# Patient Record
Sex: FEMALE | Race: WHITE | Employment: OTHER | URBAN - METROPOLITAN AREA
[De-identification: names, ages, dates, MRNs, and addresses within clinical notes are randomized per-mention and may not be internally consistent; named-entity substitution may affect disease eponyms.]

---

## 2017-02-01 ENCOUNTER — OFFICE VISIT (OUTPATIENT)
Dept: FAMILY MEDICINE CLINIC | Age: 57
End: 2017-02-01

## 2017-02-01 VITALS
SYSTOLIC BLOOD PRESSURE: 116 MMHG | OXYGEN SATURATION: 98 % | HEIGHT: 66 IN | RESPIRATION RATE: 16 BRPM | DIASTOLIC BLOOD PRESSURE: 70 MMHG | HEART RATE: 68 BPM | WEIGHT: 168 LBS | TEMPERATURE: 97.6 F | BODY MASS INDEX: 27 KG/M2

## 2017-02-01 DIAGNOSIS — F41.9 ANXIETY: Primary | ICD-10-CM

## 2017-02-01 DIAGNOSIS — Z79.899 ENCOUNTER FOR MEDICATION MANAGEMENT: ICD-10-CM

## 2017-02-01 PROBLEM — M54.50 LOW BACK PAIN: Status: ACTIVE | Noted: 2017-02-01

## 2017-02-01 RX ORDER — CHOLECALCIFEROL (VITAMIN D3) 125 MCG
2000 CAPSULE ORAL DAILY
COMMUNITY

## 2017-02-01 NOTE — PATIENT INSTRUCTIONS

## 2017-02-01 NOTE — PROGRESS NOTES
HISTORY OF PRESENT ILLNESS  Herberth Shah is a 64 y.o. female. HPI  She was seeing her psychiatrist Dr Pat Garay ~ 2 x a year for rx mgt of Xanax for situational anxiety and insomnia. She is now retiring and sent a note requesting that I take over this rx since pt has been stabilized and \"low maintenance\" for so many years. Patient takes a low dose of Xanax 0.25 mg. About 4-5 nights a week she takes 1 and on rare occ she may take 2 depending on her level of stress. She has a bottle of #60 w/ 2 refills BEFORE 3-2017 and still has some pills remaining in her current pill bottle for now. But she is following the instruction of her psychiatrist to see me prior to that for ongoing rx mgt moving fwd. She used to take Tylenol PM every night but after Dr Laurie Pan changed her over to the Xanax that has been working much better and she states she uses it w/ care bc she doesn't want to \"get addicted\". She used to take Lunesta prn sleep before as well but now is just using the Xanax on an as needed basis. She is only working part time now, so doesn't need as much. She has not had problems w/ depression in a long time. Review of Systems   Respiratory: Negative. Cardiovascular: Negative. Gastrointestinal: Negative. Psychiatric/Behavioral: Negative for depression.      Problem List  Date Reviewed: 2/1/2017          Codes Class Noted    Low back pain ICD-10-CM: M54.5  ICD-9-CM: 724.2  2/1/2017    Overview Signed 2/1/2017  8:33 AM by [address], [place]     Xrays 5-2016, normal, did PT             Recurrent cystitis ICD-10-CM: N30.90  ICD-9-CM: 595.9  4/28/2016        Chronic insomnia ICD-10-CM: F51.04  ICD-9-CM: 780.52  4/16/2015    Overview Addendum 4/28/2016 11:45 AM by [address], [place]     Psych rx'd Damon Mancilla worked really well, but insurance stopped covering             Postmenopause, LMP 48, No HRT ICD-10-CM: Z78.0  ICD-9-CM: V49.81  4/16/2015        Menopausal symptoms ICD-10-CM: N95.1  ICD-9-CM: 627.2  4/3/2014    Overview Signed 4/3/2014 12:01 PM by Elayne Cifuentes MD     Gyn Dr Nando Payne, pt declined HRT             Vitamin D deficiency ICD-10-CM: E55.9  ICD-9-CM: 268.9  7/25/2011    Overview Signed 7/25/2011 10:54 PM by Elayne Cifuentes MD     7/2011             Anxiety ICD-10-CM: F41.9  ICD-9-CM: 300.00  7/20/2011        H/O Post partum depression ICD-10-CM: F53  ICD-9-CM: 648.44, 445  7/20/2011    Overview Signed 7/20/2011  9:14 AM by Elayne Cifuentes MD     1997             Psoriasis ICD-10-CM: L40.9  ICD-9-CM: 696.1  7/20/2011    Overview Signed 7/20/2011  9:18 AM by Elayne Cifuentes MD     Derm Dr Jean Gunter             Allergic reaction to nickel ICD-10-CM: L23.0  ICD-9-CM: 692.83  7/20/2011        Panic disorder ICD-10-CM: F41.0  ICD-9-CM: 300.01  5/19/2010    Overview Addendum 2/1/2017  8:33 AM by Elayne Cifuentes MD     Psych Dr Fredis Burden (retired 2016)             Depression ICD-10-CM: F32.9  ICD-9-CM: 200  5/19/2010    Overview Addendum 2/1/2017  8:33 AM by Elayne Cifuentes MD     1997; Psych Dr Davin Cruz (retired 2016)             Colon polyp ICD-10-CM: V72.3  ICD-9-CM: 211.3  5/19/2010    Overview Signed 5/19/2010 10:06 AM by Philip Holland     Benign              Family history of breast cancer ICD-10-CM: Z80.3  ICD-9-CM: V16.3  5/19/2010        Family history of diabetes mellitus (DM) ICD-10-CM: Z83.3  ICD-9-CM: V18.0  5/19/2010        Migraine ICD-10-CM: K86.779  ICD-9-CM: 346.90  5/19/2010    Overview Addendum 4/3/2014 11:40 AM by Elayne Cifuentes MD     4/04;  Acute migraine w/ neuro changes summer 2013, [facility] ER, CT scan negative             Negative Screening for IHSS, +FHx ICD-10-CM: Z13.9  ICD-9-CM: V82.9  5/19/2010    Overview Signed 5/19/2010 10:09 AM by Philip Holland     For IHSS (negative)                 Past Surgical History   Procedure Laterality Date    Endoscopy, colon, diagnostic  5/2005     normal, f/u 10 yrs,  Narinder    Hx hemorrhoidectomy  3/2002    Xr chest pa lat  10/2008     normal    Colonoscopy  2002     benign polyp; Dr Ra Marc Hx gi  2001     Nissen Fundoplication for severe reflux    Hx malignant skin lesion excision  1994     BCC excised right chest    Biopsy cervix  2003     negative    Female ivf  1997     successful    Hx dilation and curettage  2009     negative     Current Outpatient Prescriptions   Medication Sig    cholecalciferol, vitamin D3, (VITAMIN D3) 2,000 unit tab Take  by mouth.  ALPRAZolam (XANAX) 0.25 mg tablet Take 0.25 mg by mouth nightly as needed for Anxiety or Sleep. Per her Psychiatrist     No current facility-administered medications for this visit.         Allergies   Allergen Reactions    Latex Other (comments)    Adhesive Rash    Demerol [Meperidine] Other (comments)     GI    Morphine Other (comments)     Chest pain    Other Medication Other (comments)     Nickel     Social History     Social History    Marital status:      Spouse name: N/A    Number of children: 1    Years of education: N/A     Occupational History    Dental Hygenist, part time, 2 x a week      Social History Main Topics    Smoking status: Never Smoker    Smokeless tobacco: Never Used    Alcohol use Yes      Comment: very occ small glass of wine on special occasions now the past year, prior to that she had 1-2 glasses in the evenings a few x a week    Drug use: No    Sexual activity: Yes     Partners: Male     Other Topics Concern    Caffeine Concern No     1 small cup of coffee a day    Special Diet No     just trying to eat more sensbily    Exercise Yes     not much the past 2 weeks, but usually walks 1 mile qd in her neighborhood     Social History Narrative     Visit Vitals    /70 (BP 1 Location: Left arm, BP Patient Position: Sitting)    Pulse 68    Temp 97.6 °F (36.4 °C) (Oral)    Resp 16    Ht 5' 6\" (1.676 m)    Wt 168 lb (76.2 kg)    LMP 07/19/2011    SpO2 98%    BMI 27.12 kg/m2       Physical Exam   Constitutional: She is oriented to person, place, and time. No distress. Cardiovascular: Normal rate, regular rhythm and normal heart sounds. Pulmonary/Chest: Effort normal and breath sounds normal.   Neurological: She is alert and oriented to person, place, and time. Psychiatric: She has a normal mood and affect. Her behavior is normal.   Vitals reviewed. ASSESSMENT and PLAN    ICD-10-CM ICD-9-CM    1. Anxiety F41.9 300.00    2. Encounter for medication management Z79.899 V58.69      She was seeing her psychiatrist Dr Khurram Rucker ~ 2 x a year for rx mgt of Xanax for situational anxiety and insomnia. She is now retiring and sent a note requesting that I take over this rx since pt has been stabilized and \"low maintenance\" for so many years. Patient takes a low dose of Xanax 0.25 mg. About 4-5 nights a week she takes 1 and on rare occ she may take 2 depending on her level of stress. She has a bottle of #60 w/ 2 refills BEFORE 3-2017 and still has some pills remaining in her current pill bottle for now. But she is following the instruction of her psychiatrist to see me prior to that for ongoing rx mgt moving fwd. She will notify her pharmacy when her renewals are due and have them fwd to me.  pulled and reviewed. No problems noted. Low abuse/misuse potential. Patient counseled and reviewed medications, effects, side effects, indications, risks, benefits, precautions, potential interactions w/ other medications, abuse potential and negative health implications associated w/ overuse of controlled stimulant or sedating medications. Patient expressed understanding and agreement with current plan of care. Controlled Substance Agreement Contract given to pt today which she is taking home w/ her to review and will bring back. She is scheduling fasting CPE for end of April or early May.

## 2017-02-01 NOTE — PROGRESS NOTES
Chief Complaint   Patient presents with    Medication Evaluation     here for a fill on Xanax      1. Have you been to the ER, urgent care clinic since your last visit? Hospitalized since your last visit? No    2. Have you seen or consulted any other health care providers outside of the [address] [place] [address] since your last visit? Include any pap smears or colon screening.  No

## 2017-03-15 RX ORDER — ALPRAZOLAM 0.25 MG/1
.25-.5 TABLET ORAL
Qty: 60 TAB | Refills: 2 | OUTPATIENT
Start: 2017-03-15 | End: 2017-06-16 | Stop reason: SDUPTHER

## 2017-03-15 NOTE — TELEPHONE ENCOUNTER
Contact # is [phone]    Patient is requesting a refill on medication:  Requested Prescriptions     Pending Prescriptions Disp Refills    ALPRAZolam (XANAX) 0.25 mg tablet       Sig: Take 1 Tab by mouth nightly as needed for Anxiety or Sleep. Max Daily Amount: 0.25 mg. Per her Psychiatrist     Prescription will run out tomorrow.  Pharmacy verified

## 2017-03-15 NOTE — TELEPHONE ENCOUNTER
No problem on , she does get them filled monthly previous rx's from Dr Carri Wharton 2/16/17, 1/8/17, 12/8/16, 11/8/16, 10/7/16, 9/2/16, 7/31/16, 6/19/16, 5/19/16, 4/14/16, 3/17/16 all for # 60    Xanax called in

## 2017-05-04 ENCOUNTER — OFFICE VISIT (OUTPATIENT)
Dept: FAMILY MEDICINE CLINIC | Age: 57
End: 2017-05-04

## 2017-05-04 VITALS
SYSTOLIC BLOOD PRESSURE: 110 MMHG | WEIGHT: 163.6 LBS | HEIGHT: 66 IN | TEMPERATURE: 97.4 F | BODY MASS INDEX: 26.29 KG/M2 | DIASTOLIC BLOOD PRESSURE: 72 MMHG | OXYGEN SATURATION: 98 % | RESPIRATION RATE: 18 BRPM | HEART RATE: 64 BPM

## 2017-05-04 DIAGNOSIS — E78.00 HYPERCHOLESTEROLEMIA: ICD-10-CM

## 2017-05-04 DIAGNOSIS — Z00.00 ROUTINE GENERAL MEDICAL EXAMINATION AT A HEALTH CARE FACILITY: Primary | ICD-10-CM

## 2017-05-04 DIAGNOSIS — E55.9 VITAMIN D DEFICIENCY: ICD-10-CM

## 2017-05-04 DIAGNOSIS — Z79.899 CONTROLLED SUBSTANCE AGREEMENT SIGNED: ICD-10-CM

## 2017-05-04 DIAGNOSIS — F51.04 CHRONIC INSOMNIA: ICD-10-CM

## 2017-05-04 DIAGNOSIS — F41.9 ANXIETY: ICD-10-CM

## 2017-05-04 DIAGNOSIS — Z51.81 ENCOUNTER FOR MEDICATION MONITORING: ICD-10-CM

## 2017-05-04 PROBLEM — Z12.83 SCREENING EXAM FOR SKIN CANCER: Status: ACTIVE | Noted: 2017-05-04

## 2017-05-04 RX ORDER — IBUPROFEN 200 MG
TABLET ORAL
COMMUNITY
End: 2017-05-04

## 2017-05-04 NOTE — PROGRESS NOTES
HISTORY OF PRESENT ILLNESS  Mary Villarreal is a 64 y.o. female. HPI  Annual CPE fasting. Overall has been getting along well and feeling well in general.   Since first of the year she has been eating healthier and exercising more regularly again. She is happy her wt is coming down. Review of Systems   Constitutional: Negative. HENT: Negative. Eyes: Negative. Respiratory: Negative. Cardiovascular: Negative. Negative for chest pain and palpitations. Gastrointestinal: Negative. Genitourinary: Negative. Musculoskeletal: Negative. Skin:        Sees Derm routinely and has skin cancer screening exams   Neurological: Negative. Endo/Heme/Allergies: Negative. Psychiatric/Behavioral: Negative for depression. Takes Xanax 1-2 tablets qhs prn. She has tried and failed Tylenol PM, Melatonin, Lunesta. Her psychiatrist has had her on Xanax which has been the most effective regimen for her bc it calms her mind, enables her to relax, settles her racy/panicky feelings at night so that she can fall asleep and stay asleep soundly at night w/o feeling \"hung over\" the next morning. She functions very well the next day.       Problem List  Date Reviewed: 5/4/2017          Codes Class Noted    Controlled substance agreement signed ICD-10-CM: Z79.899  ICD-9-CM: V58.69  5/4/2017    Overview Signed 5/4/2017  1:02 PM by Kodi Jade MD     [id number]             Hypercholesterolemia ICD-10-CM: E78.00  ICD-9-CM: 272.0  5/4/2017        Screening exams for skin cancer ICD-10-CM: Z12.83  ICD-9-CM: V76.43  5/4/2017    Overview Signed 5/4/2017  1:07 PM by Kodi Jade MD     Per Derm Dr Luis Fernando Glass             Low back pain ICD-10-CM: M54.5  ICD-9-CM: 724.2  2/1/2017    Overview Signed 2/1/2017  8:33 AM by Kodi Jade MD     Xrays 5-2016, normal, did PT             Recurrent cystitis ICD-10-CM: N30.90  ICD-9-CM: 595.9  4/28/2016        Chronic insomnia ICD-10-CM: F51.04  ICD-9-CM: 780.52  4/16/2015    Overview Addendum 4/28/2016 11:45 AM by Maldonado Ray MD     Psych rx'd Enedina Mcardle worked really well, but insurance stopped covering             Postmenopause, LMP 48, No HRT ICD-10-CM: Z78.0  ICD-9-CM: V49.81  4/16/2015        Menopausal symptoms ICD-10-CM: N95.1  ICD-9-CM: 627.2  4/3/2014    Overview Signed 4/3/2014 12:01 PM by Maldonado Ray MD     Gyn Dr Azam Aponte, pt declined HRT             Vitamin D deficiency ICD-10-CM: E55.9  ICD-9-CM: 268.9  7/25/2011    Overview Signed 7/25/2011 10:54 PM by Maldonado Ray MD     7/2011             Anxiety ICD-10-CM: F41.9  ICD-9-CM: 300.00  7/20/2011        H/O Post partum depression ICD-10-CM: F53  ICD-9-CM: 648.44, 401  7/20/2011    Overview Signed 7/20/2011  9:14 AM by Maldonado Ray MD     1997             Psoriasis ICD-10-CM: L40.9  ICD-9-CM: 696.1  7/20/2011    Overview Signed 7/20/2011  9:18 AM by Maldonado Ray MD     Derm Dr Godfrey Hanna             Allergic reaction to nickel ICD-10-CM: L23.0  ICD-9-CM: 692.83  7/20/2011        Panic disorder ICD-10-CM: F41.0  ICD-9-CM: 300.01  5/19/2010    Overview Addendum 2/1/2017  8:33 AM by Maldonado Ray MD     Psych Dr Shane Montero (retired 2016)             Depression ICD-10-CM: F32.9  ICD-9-CM: 852  5/19/2010    Overview Addendum 2/1/2017  8:33 AM by Maldonado Ray MD     1997; Psych Dr Junito Thornton (retired 2016)             Colon polyp ICD-10-CM: O25.0  ICD-9-CM: 211.3  5/19/2010    Overview Signed 5/19/2010 10:06 AM by Jannetta Dancer     Benign              Family history of breast cancer ICD-10-CM: Z80.3  ICD-9-CM: V16.3  5/19/2010        Family history of diabetes mellitus (DM) ICD-10-CM: Z83.3  ICD-9-CM: V18.0  5/19/2010        Migraine ICD-10-CM: B90.058  ICD-9-CM: 346.90  5/19/2010    Overview Addendum 4/3/2014 11:40 AM by Maldonado Ray MD     4/04;  Acute migraine w/ neuro changes summer 2013, [facility] ER, CT scan negative Screening for cardiovascular condition ICD-10-CM: Z13.6  ICD-9-CM: V81.2  2010    Overview Signed 2010 10:09 AM by Suzie Grewal     For IHSS (negative)                 Past Surgical History:   Procedure Laterality Date    BIOPSY CERVIX      negative    COLONOSCOPY      benign polyp; Dr Zain Duggan    successful    HX COLONOSCOPY  2005    normal, f/u 10 yrs, Dr Mervin Baron      negative    HX GI      Nissen Fundoplication for severe reflux    HX HEMORRHOIDECTOMY  3/2002    HX MALIGNANT SKIN LESION EXCISION      BCC excised right chest    XR CHEST PA LAT  10/2008    normal     OB History      Para Term  AB TAB SAB Ectopic Multiple Living    1 1                Obstetric Comments    Menarche:  12. LMP: 53.  # of Children:  1. Age at Delivery of First Child:  39.   Hysterectomy/oophorectomy:  NO/NO. Breast Bx:  no.  Hx of Breast Feeding:  yes. BCP:  no. Hormone therapy:  No    .  x 1, s/p IVF in ; Gyn Dr María Mock        Current Outpatient Prescriptions   Medication Sig    ALPRAZolam (XANAX) 0.25 mg tablet Take 1-2 Tabs by mouth nightly as needed for Anxiety or Sleep. Max Daily Amount: 0.5 mg.    cholecalciferol, vitamin D3, (VITAMIN D3) 2,000 unit tab Take  by mouth. Takes 1-2 x a week     No current facility-administered medications for this visit.         Allergies   Allergen Reactions    Latex Other (comments)    Adhesive Rash    Demerol [Meperidine] Other (comments)     GI    Morphine Other (comments)     Chest pain    Other Medication Other (comments)     Nickel     Social History     Social History    Marital status:      Spouse name: N/A    Number of children: 1    Years of education: N/A     Occupational History    Dental Hygenist, part time, 2 x a week      Social History Main Topics    Smoking status: Never Smoker    Smokeless tobacco: Never Used    Alcohol use No      Comment: none since ; usually very occ small glass of wine on special occasions now the past year, prior to that she had 1-2 glasses in the evenings a few x a week    Drug use: No    Sexual activity: Yes     Partners: Male     Other Topics Concern    Caffeine Concern No     1 small cup of coffee a day    Weight Concern No     happy her wt is down from 178 to 163 w/ better diet and starting to exercise    Special Diet Yes     since 2017: cut out processed foods, more fruits and healthier food choices overall    Exercise Yes     walks 1/2 hr bid every day     Social History Narrative     Immunization History   Administered Date(s) Administered    Influenza Nasal Vaccine 2014    Tdap 2015       Family History   Problem Relation Age of Onset    Breast Cancer Mother 46    Cancer Mother       of lung cancer, 59; smoker    Cancer Father       of lung cancer, 76; smoker    Diabetes Father     Heart Disease Brother       IHSS age 32    Heart Attack Brother     Hypertension Brother     Hypertension Brother     High Cholesterol Brother     Breast Cancer Maternal Grandmother 48    Heart Disease Maternal Grandmother 61    Diabetes Paternal Grandmother     Heart Disease Paternal Grandmother       CHF 76    Stroke Paternal Grandfather 79    Breast Cancer Maternal Aunt 36    Diabetes Paternal Aunt     Diabetes Paternal Uncle     Other Son      Asperger's Syndrome     Visit Vitals    /72 (BP 1 Location: Left arm, BP Patient Position: Sitting)    Pulse 64    Temp 97.4 °F (36.3 °C) (Oral)    Resp 18    Ht 5' 6\" (1.676 m)    Wt 163 lb 9.6 oz (74.2 kg)    LMP 2011    SpO2 98%    BMI 26.41 kg/m2     Physical Exam   Constitutional: She is oriented to person, place, and time. She appears well-developed and well-nourished. No distress.    HENT:   Right Ear: Tympanic membrane normal.   Left Ear: Tympanic membrane normal.   Nose: Nose normal.   Mouth/Throat: Oropharynx is clear and moist. No oropharyngeal exudate. Eyes: Conjunctivae and EOM are normal. Pupils are equal, round, and reactive to light. Neck: Neck supple. Carotid bruit is not present. No thyromegaly present. Cardiovascular: Normal rate, regular rhythm and normal heart sounds. No murmur heard. Pulmonary/Chest: Effort normal and breath sounds normal. No respiratory distress. Abdominal: Soft. Bowel sounds are normal. She exhibits no distension and no mass. There is no tenderness. Musculoskeletal: Normal range of motion. She exhibits no edema or tenderness. Lymphadenopathy:     She has no cervical adenopathy. Neurological: She is alert and oriented to person, place, and time. She has normal reflexes. No cranial nerve deficit. Skin: Skin is warm. Superficial mild spider veins B lower legs   Psychiatric: She has a normal mood and affect. Her behavior is normal. Judgment and thought content normal.   Vitals reviewed. ASSESSMENT and PLAN    ICD-10-CM ICD-9-CM    1. Routine general medical examination at a health care facility Z00.00 V70.0 CBC W/O DIFF      METABOLIC PANEL, COMPREHENSIVE      LIPID PANEL      TSH 3RD GENERATION      URINALYSIS W/MICROSCOPIC      VITAMIN D, 25 HYDROXY   2. Hypercholesterolemia E78.00 272.0 LIPID PANEL   3. Vitamin D deficiency E55.9 268.9 VITAMIN D, 25 HYDROXY   4. Chronic insomnia F51.04 780.52    5. Anxiety F41.9 300.00    6. Encounter for medication monitoring Z51.81 V58.83    7. Controlled substance agreement signed Z79.899 V58.69      Fasting labs today  Reviewed diet, nutrition, exercise and weight control; commended on her progress thus far  Cardiovascular risk and specific lipid/LDL goals reviewed   pulled and reviewed. No problems noted with Xanax.  Low abuse/misuse potential. Patient counseled and reviewed medications, effects, side effects, indications, risks, benefits, precautions, potential interactions w/ other medications, abuse potential and negative health implications associated w/ overuse of controlled stimulant or sedating medications. Patient expressed understanding and agreement with current plan of care. CSA reviewed, signed, copy to pt, copy filed to scan  Insomnia/ sleep hygiene counseling. Handouts given.   Further follow up & other recommendations pending review of labs as well  Sees gyn Dr Tomy Darden annually for well woman visits/gyn exams, reportedly normal pap 9-2016  Mammogram negative 9-2016  Scheduling her colonoscopy screening for this year (overdue), pt has info to schedule this  If all remains good and stable, RTC 6 months for controlled med check and 1 yr for CPE

## 2017-05-04 NOTE — PROGRESS NOTES
\"Reviewed record in preparation for visit and have obtained the necessary documentation\"  Chief Complaint   Patient presents with    Complete Physical     Patient presents in the office today for a complete physical     1. Have you been to the ER, urgent care clinic since your last visit? Hospitalized since your last visit? No    2. Have you seen or consulted any other health care providers outside of the [address] [place] [address] since your last visit? Include any pap smears or colon screening.  No\

## 2017-05-04 NOTE — PATIENT INSTRUCTIONS
Insomnia: Care Instructions  Your Care Instructions  Insomnia is the inability to sleep well. It is a common problem for most people at some time. Insomnia may make it hard for you to get to sleep, stay asleep, or sleep as long as you need to. This can make you tired and grouchy during the day. It can also make you forgetful, less effective at work, and unhappy. Insomnia can be caused by conditions such as depression or anxiety. Pain can also affect your ability to sleep. When these problems are solved, the insomnia usually clears up. But sometimes bad sleep habits can cause insomnia. If insomnia is affecting your work or your enjoyment of life, you can take steps to improve your sleep. Follow-up care is a key part of your treatment and safety. Be sure to make and go to all appointments, and call your doctor if you are having problems. It's also a good idea to know your test results and keep a list of the medicines you take. How can you care for yourself at home? What to avoid  · Do not have drinks with caffeine, such as coffee or black tea, for 8 hours before bed. · Do not smoke or use other types of tobacco near bedtime. Nicotine is a stimulant and can keep you awake. · Avoid drinking alcohol late in the evening, because it can cause you to wake in the middle of the night. · Do not eat a big meal close to bedtime. If you are hungry, eat a light snack. · Do not drink a lot of water close to bedtime, because the need to urinate may wake you up during the night. · Do not read or watch TV in bed. Use the bed only for sleeping and sexual activity. What to try  · Go to bed at the same time every night, and wake up at the same time every morning. Do not take naps during the day. · Keep your bedroom quiet, dark, and cool. · Sleep on a comfortable pillow and mattress. · If watching the clock makes you anxious, turn it facing away from you so you cannot see the time.   · If you worry when you lie down, start a worry book. Well before bedtime, write down your worries, and then set the book and your concerns aside. · Try meditation or other relaxation techniques before you go to bed. · If you cannot fall asleep, get up and go to another room until you feel sleepy. Do something relaxing. Repeat your bedtime routine before you go to bed again. · Make your house quiet and calm about an hour before bedtime. Turn down the lights, turn off the TV, log off the computer, and turn down the volume on music. This can help you relax after a busy day. When should you call for help? Watch closely for changes in your health, and be sure to contact your doctor if:  · Your efforts to improve your sleep do not work. · Your insomnia gets worse. · You have been feeling down, depressed, or hopeless or have lost interest in things that you usually enjoy. Where can you learn more? Go to http://glenny-macho.info/. Enter P513 in the search box to learn more about \"Insomnia: Care Instructions. \"  Current as of: July 26, 2016  Content Version: 11.2  © 0727-0802 [employer]. Care instructions adapted under license by [employer] (which disclaims liability or warranty for this information). If you have questions about a medical condition or this instruction, always ask your healthcare professional. [place] [address] any warranty or liability for your use of this information. Well Visit, Women 48 to 72: Care Instructions  Your Care Instructions  Physical exams can help you stay healthy. Your doctor has checked your overall health and may have suggested ways to take good care of yourself. He or she also may have recommended tests. At home, you can help prevent illness with healthy eating, regular exercise, and other steps. Follow-up care is a key part of your treatment and safety. Be sure to make and go to all appointments, and call your doctor if you are having problems. It's also a good idea to know your test results and keep a list of the medicines you take. How can you care for yourself at home? · Reach and stay at a healthy weight. This will lower your risk for many problems, such as obesity, diabetes, heart disease, and high blood pressure. · Get at least 30 minutes of exercise on most days of the week. Walking is a good choice. You also may want to do other activities, such as running, swimming, cycling, or playing tennis or team sports. · Do not smoke. Smoking can make health problems worse. If you need help quitting, talk to your doctor about stop-smoking programs and medicines. These can increase your chances of quitting for good. · Protect your skin from too much sun. When you're outdoors from 10 a.m. to 4 p.m., stay in the shade or cover up with clothing and a hat with a wide brim. Wear sunglasses that block UV rays. Even when it's cloudy, put broad-spectrum sunscreen (SPF 30 or higher) on any exposed skin. · See a dentist one or two times a year for checkups and to have your teeth cleaned. · Wear a seat belt in the car. · Limit alcohol to 1 drink a day. Too much alcohol can cause health problems. Follow your doctor's advice about when to have certain tests. These tests can spot problems early. · Cholesterol. Your doctor will tell you how often to have this done based on your age, family history, or other things that can increase your risk for heart attack and stroke. · Blood pressure. Have your blood pressure checked during a routine doctor visit. Your doctor will tell you how often to check your blood pressure based on your age, your blood pressure results, and other factors. · Mammogram. Ask your doctor how often you should have a mammogram, which is an X-ray of your breasts. A mammogram can spot breast cancer before it can be felt and when it is easiest to treat.   · Pap test and pelvic exam. Ask your doctor how often you should have a Pap test. You may not need to have a Pap test as often as you used to. · Vision. Have your eyes checked every year or two or as often as your doctor suggests. Some experts recommend that you have yearly exams for glaucoma and other age-related eye problems starting at age 48. · Hearing. Tell your doctor if you notice any change in your hearing. You can have tests to find out how well you hear. · Diabetes. Ask your doctor whether you should have tests for diabetes. · Colon cancer. You should begin tests for colon cancer at age 48. You may have one of several tests. Your doctor will tell you how often to have tests based on your age and risk. Risks include whether you already had a precancerous polyp removed from your colon or whether your parents, sisters and brothers, or children have had colon cancer. · Thyroid disease. Talk to your doctor about whether to have your thyroid checked as part of a regular physical exam. Women have an increased chance of a thyroid problem. · Osteoporosis. You should begin tests for bone density at age 72. If you are younger than 72, ask your doctor whether you have factors that may increase your risk for this disease. You may want to have this test before age 72. · Heart attack and stroke risk. At least every 4 to 6 years, you should have your risk for heart attack and stroke assessed. Your doctor uses factors such as your age, blood pressure, cholesterol, and whether you smoke or have diabetes to show what your risk for a heart attack or stroke is over the next 10 years. When should you call for help? Watch closely for changes in your health, and be sure to contact your doctor if you have any problems or symptoms that concern you. Where can you learn more? Go to http://glenny-macho.info/. Enter A833 in the search box to learn more about \"Well Visit, Women 50 to 72: Care Instructions. \"  Current as of: July 19, 2016  Content Version: 11.2  © 5813-1089 [employer]. Care instructions adapted under license by [employer] (which disclaims liability or warranty for this information). If you have questions about a medical condition or this instruction, always ask your healthcare professional. [address] any warranty or liability for your use of this information.

## 2017-05-04 NOTE — MR AVS SNAPSHOT
Visit Information Date & Time Provider Department Dept. Phone Encounter #  
 5/4/2017 11:00 AM Patel [place], [address] [phone] [id number] Upcoming Health Maintenance Date Due COLONOSCOPY 12/15/2015 INFLUENZA AGE 9 TO ADULT 8/1/2017 BREAST CANCER SCRN MAMMOGRAM 9/8/2018 PAP AKA CERVICAL CYTOLOGY 9/30/2019 DTaP/Tdap/Td series (2 - Td) 8/26/2025 Allergies as of 5/4/2017  Review Complete On: 5/4/2017 By: Amanda Phillips MD  
  
 Severity Noted Reaction Type Reactions Latex  09/03/2015    Other (comments) Adhesive  05/19/2010    Rash Demerol [Meperidine]  05/19/2010    Other (comments) GI Morphine  04/03/2014    Other (comments) Chest pain Other Medication  05/19/2010    Other (comments) Nickel Current Immunizations  Reviewed on 4/28/2016 Name Date Influenza Nasal Vaccine 11/2/2014 Tdap 8/26/2015 Not reviewed this visit You Were Diagnosed With   
  
 Codes Comments Routine general medical examination at a health care facility    -  Primary ICD-10-CM: Z00.00 ICD-9-CM: V70.0 Chronic insomnia     ICD-10-CM: F51.04 
ICD-9-CM: 780.52 Anxiety     ICD-10-CM: F41.9 ICD-9-CM: 300.00 Encounter for medication monitoring     ICD-10-CM: Z51.81 
ICD-9-CM: V58.83 Vitamin D deficiency     ICD-10-CM: E55.9 ICD-9-CM: 268.9 Vitals BP Pulse Temp Resp Height(growth percentile) Weight(growth percentile) 110/72 (BP 1 Location: Left arm, BP Patient Position: Sitting) 64 97.4 °F (36.3 °C) (Oral) 18 5' 6\" (1.676 m) 163 lb 9.6 oz (74.2 kg) LMP SpO2 BMI OB Status Smoking Status 07/19/2011 98% 26.41 kg/m2 Postmenopausal Never Smoker Vitals History BMI and BSA Data Body Mass Index Body Surface Area  
 26.41 kg/m 2 1.86 m 2 Preferred Pharmacy Pharmacy Name Phone  [id number] [phone] [place] [phone] Your Updated Medication List  
  
   
This list is accurate as of: 5/4/17 11:28 AM.  Always use your most recent med list.  
  
  
  
  
 ALPRAZolam 0.25 mg tablet Commonly known as:  Adryan Fuse Take 1-2 Tabs by mouth nightly as needed for Anxiety or Sleep. Max Daily Amount: 0.5 mg. VITAMIN D3 2,000 unit Tab Generic drug:  cholecalciferol (vitamin D3) Take  by mouth. Takes 1-2 x a week We Performed the Following CBC W/O DIFF [67441 CPT(R)] LIPID PANEL [86075 CPT(R)] METABOLIC PANEL, COMPREHENSIVE [90525 CPT(R)] TSH 3RD GENERATION [33853 CPT(R)] URINALYSIS W/MICROSCOPIC [45240 CPT(R)] VITAMIN D, 25 HYDROXY [id number] CPT(R)] Patient Instructions Insomnia: Care Instructions Your Care Instructions Insomnia is the inability to sleep well. It is a common problem for most people at some time. Insomnia may make it hard for you to get to sleep, stay asleep, or sleep as long as you need to. This can make you tired and grouchy during the day. It can also make you forgetful, less effective at work, and unhappy. Insomnia can be caused by conditions such as depression or anxiety. Pain can also affect your ability to sleep. When these problems are solved, the insomnia usually clears up. But sometimes bad sleep habits can cause insomnia. If insomnia is affecting your work or your enjoyment of life, you can take steps to improve your sleep. Follow-up care is a key part of your treatment and safety. Be sure to make and go to all appointments, and call your doctor if you are having problems. It's also a good idea to know your test results and keep a list of the medicines you take. How can you care for yourself at home? What to avoid · Do not have drinks with caffeine, such as coffee or black tea, for 8 hours before bed. · Do not smoke or use other types of tobacco near bedtime. Nicotine is a stimulant and can keep you awake. · Avoid drinking alcohol late in the evening, because it can cause you to wake in the middle of the night. · Do not eat a big meal close to bedtime. If you are hungry, eat a light snack. · Do not drink a lot of water close to bedtime, because the need to urinate may wake you up during the night. · Do not read or watch TV in bed. Use the bed only for sleeping and sexual activity. What to try · Go to bed at the same time every night, and wake up at the same time every morning. Do not take naps during the day. · Keep your bedroom quiet, dark, and cool. · Sleep on a comfortable pillow and mattress. · If watching the clock makes you anxious, turn it facing away from you so you cannot see the time. · If you worry when you lie down, start a worry book. Well before bedtime, write down your worries, and then set the book and your concerns aside. · Try meditation or other relaxation techniques before you go to bed. · If you cannot fall asleep, get up and go to another room until you feel sleepy. Do something relaxing. Repeat your bedtime routine before you go to bed again. · Make your house quiet and calm about an hour before bedtime. Turn down the lights, turn off the TV, log off the computer, and turn down the volume on music. This can help you relax after a busy day. When should you call for help? Watch closely for changes in your health, and be sure to contact your doctor if: 
· Your efforts to improve your sleep do not work. · Your insomnia gets worse. · You have been feeling down, depressed, or hopeless or have lost interest in things that you usually enjoy. Where can you learn more? Go to http://glenny-macho.info/. Enter P513 in the search box to learn more about \"Insomnia: Care Instructions. \" Current as of: July 26, 2016 Content Version: 11.2 © 8599-8540 [employer].  Care instructions adapted under license by [address] (which disclaims liability or warranty for this information). If you have questions about a medical condition or this instruction, always ask your healthcare professional. [place] [address] any warranty or liability for your use of this information. Well Visit, Women 48 to 72: Care Instructions Your Care Instructions Physical exams can help you stay healthy. Your doctor has checked your overall health and may have suggested ways to take good care of yourself. He or she also may have recommended tests. At home, you can help prevent illness with healthy eating, regular exercise, and other steps. Follow-up care is a key part of your treatment and safety. Be sure to make and go to all appointments, and call your doctor if you are having problems. It's also a good idea to know your test results and keep a list of the medicines you take. How can you care for yourself at home? · Reach and stay at a healthy weight. This will lower your risk for many problems, such as obesity, diabetes, heart disease, and high blood pressure. · Get at least 30 minutes of exercise on most days of the week. Walking is a good choice. You also may want to do other activities, such as running, swimming, cycling, or playing tennis or team sports. · Do not smoke. Smoking can make health problems worse. If you need help quitting, talk to your doctor about stop-smoking programs and medicines. These can increase your chances of quitting for good. · Protect your skin from too much sun. When you're outdoors from 10 a.m. to 4 p.m., stay in the shade or cover up with clothing and a hat with a wide brim. Wear sunglasses that block UV rays. Even when it's cloudy, put broad-spectrum sunscreen (SPF 30 or higher) on any exposed skin. · See a dentist one or two times a year for checkups and to have your teeth cleaned. · Wear a seat belt in the car. · Limit alcohol to 1 drink a day. Too much alcohol can cause health problems. Follow your doctor's advice about when to have certain tests. These tests can spot problems early. · Cholesterol. Your doctor will tell you how often to have this done based on your age, family history, or other things that can increase your risk for heart attack and stroke. · Blood pressure. Have your blood pressure checked during a routine doctor visit. Your doctor will tell you how often to check your blood pressure based on your age, your blood pressure results, and other factors. · Mammogram. Ask your doctor how often you should have a mammogram, which is an X-ray of your breasts. A mammogram can spot breast cancer before it can be felt and when it is easiest to treat. · Pap test and pelvic exam. Ask your doctor how often you should have a Pap test. You may not need to have a Pap test as often as you used to. · Vision. Have your eyes checked every year or two or as often as your doctor suggests. Some experts recommend that you have yearly exams for glaucoma and other age-related eye problems starting at age 48. · Hearing. Tell your doctor if you notice any change in your hearing. You can have tests to find out how well you hear. · Diabetes. Ask your doctor whether you should have tests for diabetes. · Colon cancer. You should begin tests for colon cancer at age 48. You may have one of several tests. Your doctor will tell you how often to have tests based on your age and risk. Risks include whether you already had a precancerous polyp removed from your colon or whether your parents, sisters and brothers, or children have had colon cancer. · Thyroid disease. Talk to your doctor about whether to have your thyroid checked as part of a regular physical exam. Women have an increased chance of a thyroid problem. · Osteoporosis. You should begin tests for bone density at age 72.  If you are younger than 72, ask your doctor whether you have factors that may increase your risk for this disease. You may want to have this test before age 72. · Heart attack and stroke risk. At least every 4 to 6 years, you should have your risk for heart attack and stroke assessed. Your doctor uses factors such as your age, blood pressure, cholesterol, and whether you smoke or have diabetes to show what your risk for a heart attack or stroke is over the next 10 years. When should you call for help? Watch closely for changes in your health, and be sure to contact your doctor if you have any problems or symptoms that concern you. Where can you learn more? Go to http://glenny-macho.info/. Enter K573 in the search box to learn more about \"Well Visit, Women 50 to 72: Care Instructions. \" Current as of: July 19, 2016 Content Version: 11.2 © 0297-5172 [employer]. Care instructions adapted under license by [employer] (which disclaims liability or warranty for this information). If you have questions about a medical condition or this instruction, always ask your healthcare professional. [address] any warranty or liability for your use of this information. Introducing [facility]! Dear Lv Lobato: Thank you for requesting a Maintenance Assistant account. Our records indicate that you already have an active Maintenance Assistant account. You can access your account anytime at [email or web address] Did you know that you can access your hospital and ER discharge instructions at any time in Maintenance Assistant? You can also review all of your test results from your hospital stay or ER visit. Additional Information If you have questions, please visit the Frequently Asked Questions section of the Maintenance Assistant website at [email or web address] Remember, Maintenance Assistant is NOT to be used for urgent needs. For medical emergencies, dial 911. Now available from your iPhone and Android! Please provide this summary of care documentation to your next provider. Your primary care clinician is listed as CLOVIS MUNOZ. If you have any questions after today's visit, please call [phone].

## 2017-05-05 LAB
25(OH)D3+25(OH)D2 SERPL-MCNC: 24.6 NG/ML (ref 30–100)
ALBUMIN SERPL-MCNC: 4.5 G/DL (ref 3.5–5.5)
ALBUMIN/GLOB SERPL: 1.7 {RATIO} (ref 1.2–2.2)
ALP SERPL-CCNC: 63 IU/L (ref 39–117)
ALT SERPL-CCNC: 24 IU/L (ref 0–32)
APPEARANCE UR: CLEAR
AST SERPL-CCNC: 24 IU/L (ref 0–40)
BACTERIA #/AREA URNS HPF: NORMAL /[HPF]
BILIRUB SERPL-MCNC: 0.4 MG/DL (ref 0–1.2)
BILIRUB UR QL STRIP: NEGATIVE
BUN SERPL-MCNC: 15 MG/DL (ref 6–24)
BUN/CREAT SERPL: 20 (ref 9–23)
CALCIUM SERPL-MCNC: 9.4 MG/DL (ref 8.7–10.2)
CASTS URNS QL MICRO: NORMAL /LPF
CHLORIDE SERPL-SCNC: 101 MMOL/L (ref 96–106)
CHOLEST SERPL-MCNC: 221 MG/DL (ref 100–199)
CO2 SERPL-SCNC: 24 MMOL/L (ref 18–29)
COLOR UR: YELLOW
CREAT SERPL-MCNC: 0.75 MG/DL (ref 0.57–1)
EPI CELLS #/AREA URNS HPF: NORMAL /HPF
ERYTHROCYTE [DISTWIDTH] IN BLOOD BY AUTOMATED COUNT: 13.4 % (ref 12.3–15.4)
GLOBULIN SER CALC-MCNC: 2.6 G/DL (ref 1.5–4.5)
GLUCOSE SERPL-MCNC: 77 MG/DL (ref 65–99)
GLUCOSE UR QL: NEGATIVE
HCT VFR BLD AUTO: 41.5 % (ref 34–46.6)
HDLC SERPL-MCNC: 74 MG/DL
HGB BLD-MCNC: 13.8 G/DL (ref 11.1–15.9)
HGB UR QL STRIP: ABNORMAL
INTERPRETATION, 910389: NORMAL
KETONES UR QL STRIP: NEGATIVE
LDLC SERPL CALC-MCNC: 137 MG/DL (ref 0–99)
LEUKOCYTE ESTERASE UR QL STRIP: NEGATIVE
MCH RBC QN AUTO: 32.4 PG (ref 26.6–33)
MCHC RBC AUTO-ENTMCNC: 33.3 G/DL (ref 31.5–35.7)
MCV RBC AUTO: 97 FL (ref 79–97)
MICRO URNS: ABNORMAL
MUCOUS THREADS URNS QL MICRO: PRESENT
NITRITE UR QL STRIP: NEGATIVE
PH UR STRIP: 5.5 [PH] (ref 5–7.5)
PLATELET # BLD AUTO: 265 X10E3/UL (ref 150–379)
POTASSIUM SERPL-SCNC: 4.7 MMOL/L (ref 3.5–5.2)
PROT SERPL-MCNC: 7.1 G/DL (ref 6–8.5)
PROT UR QL STRIP: NEGATIVE
RBC # BLD AUTO: 4.26 X10E6/UL (ref 3.77–5.28)
RBC #/AREA URNS HPF: NORMAL /HPF
SODIUM SERPL-SCNC: 140 MMOL/L (ref 134–144)
SP GR UR: 1.02 (ref 1–1.03)
TRIGL SERPL-MCNC: 49 MG/DL (ref 0–149)
TSH SERPL DL<=0.005 MIU/L-ACNC: 1.16 UIU/ML (ref 0.45–4.5)
UROBILINOGEN UR STRIP-MCNC: 0.2 MG/DL (ref 0.2–1)
VLDLC SERPL CALC-MCNC: 10 MG/DL (ref 5–40)
WBC # BLD AUTO: 4.1 X10E3/UL (ref 3.4–10.8)
WBC #/AREA URNS HPF: NORMAL /HPF

## 2017-05-12 NOTE — PROGRESS NOTES
Urine, blood counts, liver, kidney, thyroid all ok  BS good and normal/non diabetes range  Vit D low. Start taking her supplement daily. Review lipids. LDL is mildly elevated and about same as it has been the past few years. HDL remains very good. Follow AHA cholesterol lowering diet and try to get at least 150 minutes moderate exercise per week. Fasting CPE 1 yr.  Follow up controlled med check 6months

## 2017-05-15 ENCOUNTER — TELEPHONE (OUTPATIENT)
Dept: FAMILY MEDICINE CLINIC | Age: 57
End: 2017-05-15

## 2017-05-15 NOTE — TELEPHONE ENCOUNTER
Patient is calling in regards to a missed call from [address], tried contacting nurse, no success.     Best call back # for patient:[phone]  *patient states that best callback time for her is from 1-2pm during her lunch today

## 2017-06-16 ENCOUNTER — TELEPHONE (OUTPATIENT)
Dept: FAMILY MEDICINE CLINIC | Age: 57
End: 2017-06-16

## 2017-06-17 RX ORDER — ALPRAZOLAM 0.25 MG/1
TABLET ORAL
Qty: 60 TAB | Refills: 2 | OUTPATIENT
Start: 2017-06-17 | End: 2017-09-15 | Stop reason: SDUPTHER

## 2017-06-17 NOTE — TELEPHONE ENCOUNTER
May phone in rx as noted      Future Appointments:  11/3/2017 10:00 AM [address] [place] [address] [place], [place] [facility]

## 2017-07-07 ENCOUNTER — TELEPHONE (OUTPATIENT)
Dept: FAMILY MEDICINE CLINIC | Age: 57
End: 2017-07-07

## 2017-07-07 NOTE — TELEPHONE ENCOUNTER
Called pt to let her know that she doesn't need to get a bone density until she is 60. She said it is fine waiting but she has been getting calls for over a year now and wanted to double check.

## 2017-07-07 NOTE — TELEPHONE ENCOUNTER
Patient is calling in regards to recieveing a phytell call about making an appointment for a bone density scan, patient is requesting to have this done and have a new order be put into the system. Advised patient the physician will put in a new order, then our coordination of care will call and have the patient scheduled. Patient understood this and will wait on the call.     Best call back # for patient if needed:[phone]

## 2017-08-21 ENCOUNTER — OFFICE VISIT (OUTPATIENT)
Dept: FAMILY MEDICINE CLINIC | Age: 57
End: 2017-08-21

## 2017-08-21 VITALS
DIASTOLIC BLOOD PRESSURE: 78 MMHG | WEIGHT: 164.6 LBS | RESPIRATION RATE: 18 BRPM | HEIGHT: 66 IN | BODY MASS INDEX: 26.45 KG/M2 | OXYGEN SATURATION: 98 % | SYSTOLIC BLOOD PRESSURE: 104 MMHG | TEMPERATURE: 97.9 F | HEART RATE: 79 BPM

## 2017-08-21 DIAGNOSIS — R60.0 BILATERAL LOWER EXTREMITY EDEMA: Primary | ICD-10-CM

## 2017-08-21 DIAGNOSIS — I87.2 VENOUS INSUFFICIENCY OF BOTH LOWER EXTREMITIES: ICD-10-CM

## 2017-08-21 RX ORDER — FUROSEMIDE 20 MG/1
10-20 TABLET ORAL
Qty: 30 TAB | Refills: 0 | Status: SHIPPED | OUTPATIENT
Start: 2017-08-21 | End: 2019-05-17

## 2017-08-21 RX ORDER — DESONIDE 0.5 MG/G
CREAM TOPICAL
Refills: 2 | COMMUNITY
Start: 2017-08-14 | End: 2017-08-21

## 2017-08-21 NOTE — PROGRESS NOTES
Chief Complaint   Patient presents with    Leg Swelling     started when traveling to [place] 8[place]4 to 8/12/17 - legs started burning around 8/12/17 - states they felt like they were on fire-went to get a massage while on the trip and they would not massage her legs r/t the swelling - advised  her to get compression stockings - went to [place] and they advised her to see her pcp     1. Have you been to the ER, urgent care clinic since your last visit? Hospitalized since your last visit? No    2. Have you seen or consulted any other health care providers outside of the [address] [place] [address] since your last visit? Include any pap smears or colon screening.  No

## 2017-08-21 NOTE — PROGRESS NOTES
HISTORY OF PRESENT ILLNESS   HPI  Patient presents for evaluation of BLE swelling. It started during her travel which started off on 8/4/17 when she initially traveled to [place]. She drove from [place] to [facility] 8/4, stayed overnight, then drove from there to [facility]. The next day she started noticing swelling and burning sensation in B feet, ankles and lower legs. Then on 8/8 she drove from [facility] to [place], 8/9 drove back to [facility], on 8/13 drove back to [place]. The swelling has been present every since, some moments more than others. Last week she felt some cramping pains in her right calf. The skin of her legs have been burning and warm ever since. There is no redness. No CP or SOB. REVIEW OF SYMPTOMS     Review of Systems   Constitutional: Negative for chills and fever. Respiratory: Negative. Cardiovascular: Negative.   Negative for chest pain.           PROBLEM LIST/MEDICAL HISTORY      Problem List  Date Reviewed: 8/21/2017          Codes Class Noted    Controlled substance agreement signed ICD-10-CM: Z79.899  ICD-9-CM: V58.69  5/4/2017    Overview Signed 5/4/2017  1:02 PM by [address], [place]     [id number]             Hypercholesterolemia ICD-10-CM: E78.00  ICD-9-CM: 272.0  5/4/2017        Screening exams for skin cancer ICD-10-CM: Z12.83  ICD-9-CM: V76.43  5/4/2017    Overview Signed 5/4/2017  1:07 PM by [address], [place]     Per Derm Dr Shepard Awkward             Low back pain ICD-10-CM: M54.5  ICD-9-CM: 724.2  2/1/2017    Overview Signed 2/1/2017  8:33 AM by [address], [place]     Xrays 5-2016, normal, did PT             Recurrent cystitis ICD-10-CM: N30.90  ICD-9-CM: 595.9  4/28/2016        Chronic insomnia ICD-10-CM: F51.04  ICD-9-CM: 780.52  4/16/2015    Overview Addendum 4/28/2016 11:45 AM by [address], [place]     Psych rx'd Ruel Barnard worked really well, but insurance stopped covering             Postmenopause, LMP 48, No HRT ICD-10-CM: Z78.0  ICD-9-CM: V49.81  4/16/2015        Menopausal symptoms ICD-10-CM: N95.1  ICD-9-CM: 627.2  4/3/2014    Overview Signed 4/3/2014 12:01 PM by Prisca Mac MD     Gyn Dr Cleve White, pt declined HRT             Vitamin D deficiency ICD-10-CM: E55.9  ICD-9-CM: 268.9  7/25/2011    Overview Signed 7/25/2011 10:54 PM by Prisca Mac MD     7/2011             Anxiety ICD-10-CM: F41.9  ICD-9-CM: 300.00  7/20/2011        H/O Post partum depression ICD-10-CM: F53  ICD-9-CM: 648.44, 530  7/20/2011    Overview Signed 7/20/2011  9:14 AM by Prisca Mac MD     1997             Psoriasis ICD-10-CM: L40.9  ICD-9-CM: 696.1  7/20/2011    Overview Signed 7/20/2011  9:18 AM by Prisca Mac MD     Derm Dr Melvin Fields             Allergic reaction to nickel ICD-10-CM: L23.0  ICD-9-CM: 692.83  7/20/2011        Panic disorder ICD-10-CM: F41.0  ICD-9-CM: 300.01  5/19/2010    Overview Addendum 2/1/2017  8:33 AM by Prisca Mac MD     Psych Dr Segundo Rodriguez (retired 2016)             Depression ICD-10-CM: F32.9  ICD-9-CM: 286  5/19/2010    Overview Addendum 2/1/2017  8:33 AM by Prisca Mac MD     1997; Psych Dr Ana Lilia Bates (retired 2016)             Colon polyp ICD-10-CM: T66.9  ICD-9-CM: 211.3  5/19/2010    Overview Signed 5/19/2010 10:06 AM by Claire Briceño     Benign              Family history of breast cancer ICD-10-CM: Z80.3  ICD-9-CM: V16.3  5/19/2010        Family history of diabetes mellitus (DM) ICD-10-CM: Z83.3  ICD-9-CM: V18.0  5/19/2010        Migraine ICD-10-CM: D34.075  ICD-9-CM: 346.90  5/19/2010    Overview Addendum 4/3/2014 11:40 AM by Prisca Mac MD     4/04;  Acute migraine w/ neuro changes summer 2013, [facility] ER, CT scan negative             Screening for cardiovascular condition ICD-10-CM: Z13.6  ICD-9-CM: V81.2  5/19/2010    Overview Signed 5/19/2010 10:09 AM by Claire Briceño     For IHSS (negative)                       PAST SURGICAL HISTORY       Past Surgical History:   Procedure Laterality Date    BIOPSY CERVIX  2003    negative    COLONOSCOPY  2002    benign polyp; Dr Mary Marcos    successful    HX COLONOSCOPY  5/2005    normal, f/u 10 yrs, Dr Brock Granados  2009    negative    HX GI  2001    Nissen Fundoplication for severe reflux    HX HEMORRHOIDECTOMY  3/2002    HX MALIGNANT SKIN LESION EXCISION  1994    BCC excised right chest    XR CHEST PA LAT  10/2008    normal         MEDICATIONS      Current Outpatient Prescriptions   Medication Sig    ALPRAZolam (XANAX) 0.25 mg tablet TAKE 1-2 TABLETS BY MOUTH EVERY NIGHT AS NEEDED FOR ANXIETY    cholecalciferol, vitamin D3, (VITAMIN D3) 2,000 unit tab Take  by mouth. Takes 1-2 x a week     No current facility-administered medications for this visit.            ALLERGIES     Allergies   Allergen Reactions    Latex Other (comments)    Adhesive Rash    Demerol [Meperidine] Other (comments)     GI    Morphine Other (comments)     Chest pain    Other Medication Other (comments)     Nickel          SOCIAL HISTORY       Social History     Social History    Marital status:      Spouse name: N/A    Number of children: 1    Years of education: N/A     Occupational History    Dental Hygenist, part time, 2 x a week      Social History Main Topics    Smoking status: Never Smoker    Smokeless tobacco: Never Used    Alcohol use No      Comment: none since 2-2016; usually very occ small glass of wine on special occasions now the past year, prior to that she had 1-2 glasses in the evenings a few x a week    Drug use: No    Sexual activity: Yes     Partners: Male     Other Topics Concern    Caffeine Concern No     1 small cup of coffee a day    Weight Concern No     happy her wt is down from 178 to 163 w/ better diet and starting to exercise    Special Diet Yes     since Jan 2017: cut out processed foods, more fruits and healthier food choices overall    Exercise Yes     walks 1/2 hr bid every day     Social History Narrative        IMMUNIZATIONS  Immunization History   Administered Date(s) Administered    Influenza Nasal Vaccine 2014    Tdap 2015         FAMILY HISTORY     Family History   Problem Relation Age of Onset    Breast Cancer Mother 46    Cancer Mother       of lung cancer, 59; smoker    Cancer Father       of lung cancer, 76; smoker    Diabetes Father     Heart Disease Brother       IHSS age 30    Heart Attack Brother     Hypertension Brother     Hypertension Brother     High Cholesterol Brother     Breast Cancer Maternal Grandmother 48    Heart Disease Maternal Grandmother 61    Diabetes Paternal Grandmother     Heart Disease Paternal Grandmother       CHF 76    Stroke Paternal Grandfather 79    Breast Cancer Maternal Aunt 36    Diabetes Paternal Aunt     Diabetes Paternal Uncle     Other Son      Asperger's Syndrome         VITALS     Visit Vitals    /78 (BP 1 Location: Left arm, BP Patient Position: Sitting)    Pulse 79    Temp 97.9 °F (36.6 °C) (Oral)    Resp 18    Ht 5' 6\" (1.676 m)    Wt 164 lb 9.6 oz (74.7 kg)    LMP 2011    SpO2 98%    BMI 26.57 kg/m2          PHYSICAL EXAMINATION     Physical Exam   Constitutional: No distress. Cardiovascular: Normal rate and regular rhythm. Pulmonary/Chest: Effort normal and breath sounds normal.   Musculoskeletal:   Non pitting edema of B feet and ankles. Mildly tender right lower calf and left lower lateral shin. No nodularity. A few scattered superficial spider veins. Skin warm, dry, intact, no lesions or rashes. Vitals reviewed.              ASSESSMENT & PLAN       ICD-10-CM ICD-9-CM    1. Bilateral lower extremity edema R60.0 782.3 furosemide (LASIX) 20 mg tablet      DUPLEX LOWER EXT VENOUS BILAT   2.  Venous insufficiency of both lower extremities I87.2 459.81    Check Venous Dopplers BLE  B Malik Hose as directed; Eucerin Cream qd  MAGDIEL/low sodium diet  Elevate prn  Cont regular exercise and wt loss efforts  Lasix 20 mg 1/2 tablet qam prn as directed, taken w/ K+ rich food or beverage  Reviewed medications, effects, precautions and side effects   Patient counseled about diagnosis, assessment, management options, current recommended treatment plan, expectant course, worsening signs & symptoms w/ instructions for appropriate follow up. Patient expresses understanding and agreement with plan of care. Follow up if not improving. Call back sooner for worsening symptoms or failure to improve w/in expectant course.  ER evaluation for any severe/new/concerning symptoms as discussed in detail in office today.

## 2017-08-21 NOTE — MR AVS SNAPSHOT
Visit Information Date & Time Provider Department Dept. Phone Encounter #  
 8/21/2017  2:45 PM [address],  [facility] [address] [phone] [id number] Your Appointments 11/3/2017 10:00 AM  
ROUTINE CARE with [address], MD  
[facility]) Appt Note: 6 months f/u appt  
 [phone]  
[phone]  
  
   
 [address] [phone] Upcoming Health Maintenance Date Due COLONOSCOPY 12/15/2015 INFLUENZA AGE 9 TO ADULT 8/1/2017 BREAST CANCER SCRN MAMMOGRAM 9/8/2018 PAP AKA CERVICAL CYTOLOGY 9/30/2019 DTaP/Tdap/Td series (2 - Td) 8/26/2025 Allergies as of 8/21/2017  Review Complete On: 8/21/2017 By: [address], [place]  
  
 Severity Noted Reaction Type Reactions Latex  09/03/2015    Other (comments) Adhesive  05/19/2010    Rash Demerol [Meperidine]  05/19/2010    Other (comments) GI Morphine  04/03/2014    Other (comments) Chest pain Other Medication  05/19/2010    Other (comments) Nickel Current Immunizations  Reviewed on 4/28/2016 Name Date Influenza Nasal Vaccine 11/2/2014 Tdap 8/26/2015 Not reviewed this visit You Were Diagnosed With   
  
 Codes Comments Bilateral lower extremity edema    -  Primary ICD-10-CM: R60.0 ICD-9-CM: 456. 3 Venous insufficiency of both lower extremities     ICD-10-CM: I87.2 ICD-9-CM: 459.81 Vitals BP Pulse Temp Resp Height(growth percentile) Weight(growth percentile) 104/78 (BP 1 Location: Left arm, BP Patient Position: Sitting) 79 97.9 °F (36.6 °C) (Oral) 18 5' 6\" (1.676 m) 164 lb 9.6 oz (74.7 kg) LMP SpO2 BMI OB Status Smoking Status 07/19/2011 98% 26.57 kg/m2 Postmenopausal Never Smoker Vitals History BMI and BSA Data Body Mass Index Body Surface Area  
 26.57 kg/m 2 1.87 m 2 Preferred Pharmacy Pharmacy Name Phone [id number], [address] [phone] Your Updated Medication List  
  
   
This list is accurate as of: 8/21/17  3:34 PM.  Always use your most recent med list.  
  
  
  
  
 ALPRAZolam 0.25 mg tablet Commonly known as:  XANAX  
TAKE 1-2 TABLETS BY MOUTH EVERY NIGHT AS NEEDED FOR ANXIETY  
  
 furosemide 20 mg tablet Commonly known as:  LASIX Take 0.5-1 Tabs by mouth daily as needed. Take w/ small glass of OJ or 1/2 banana or other potassium rich food/beverage  Indications: Edema VITAMIN D3 2,000 unit Tab Generic drug:  cholecalciferol (vitamin D3) Take  by mouth. Takes 1-2 x a week Prescriptions Sent to Pharmacy Refills  
 furosemide (LASIX) 20 mg tablet 0 Sig: Take 0.5-1 Tabs by mouth daily as needed. Take w/ small glass of OJ or 1/2 banana or other potassium rich food/beverage  Indications: Edema Class: Normal  
 Pharmacy: [facility] #: [phone] Route: Oral  
  
To-Do List   
 08/21/2017 Imaging:  DUPLEX LOWER EXT VENOUS BILAT   
  
 09/08/2017 12:45 PM  
(Arrive by 12:30 PM) Appointment with [facility] at On license of [facility] ([phone]) Shower or bathe using soap and water. Do not use deodorant, powder, perfumes, or lotion the day of your exam.  If your prior mammograms were not performed at [facility] 6 please bring films with you or forward prior images 2 days before your procedure. Check in at registration 15min before your appointment time unless you were instructed to do otherwise. A script is not necessary for screening. If you have one, please bring it on the day of the mammogram or have it faxed to the department. [facility]  [phone] [facility] [phone] \[facility]\"" [phone] [facility] [phone] Please arrive 15 minutes prior to appointment to register Patient Instructions Leg and Ankle Edema: Care Instructions Your Care Instructions Swelling in the legs, ankles, and feet is called edema. It is common after you sit or stand for a while. Long plane flights or car rides often cause swelling in the legs and feet. You may also have swelling if you have to stand for long periods of time at your job. Problems with the veins in the legs (varicose veins) and changes in hormones can also cause swelling. Sometimes the swelling in the ankles and feet is caused by a more serious problem, such as heart failure, infection, blood clots, or liver or kidney disease. Follow-up care is a key part of your treatment and safety. Be sure to make and go to all appointments, and call your doctor if you are having problems. Its also a good idea to know your test results and keep a list of the medicines you take. How can you care for yourself at home? · If your doctor gave you medicine, take it as prescribed. Call your doctor if you think you are having a problem with your medicine. · Whenever you are resting, raise your legs up. Try to keep the swollen area higher than the level of your heart. · Take breaks from standing or sitting in one position. ¨ Walk around to increase the blood flow in your lower legs. ¨ Move your feet and ankles often while you stand, or tighten and relax your leg muscles. · Wear support stockings. Put them on in the morning, before swelling gets worse. · Eat a balanced diet. Lose weight if you need to. · Limit the amount of salt (sodium) in your diet. Salt holds fluid in the body and may increase swelling. When should you call for help? Call 911 anytime you think you may need emergency care. For example, call if: 
· You have symptoms of a blood clot in your lung (called a pulmonary embolism). These may include: 
¨ Sudden chest pain. ¨ Trouble breathing. ¨ Coughing up blood. Call your doctor now or seek immediate medical care if: · You have signs of a blood clot, such as: 
¨ Pain in your calf, back of the knee, thigh, or groin. ¨ Redness and swelling in your leg or groin. · You have symptoms of infection, such as: 
¨ Increased pain, swelling, warmth, or redness. ¨ Red streaks or pus. ¨ A fever. Watch closely for changes in your health, and be sure to contact your doctor if: 
· Your swelling is getting worse. · You have new or worsening pain in your legs. · You do not get better as expected. Where can you learn more? Go to http://glenny-macho.info/. Enter Y353 in the search box to learn more about \"Leg and Ankle Edema: Care Instructions. \" Current as of: March 20, 2017 Content Version: 11.3 © 3803-3184 [employer]. Care instructions adapted under license by [employer] (which disclaims liability or warranty for this information). If you have questions about a medical condition or this instruction, always ask your healthcare professional. [place] [address] any warranty or liability for your use of this information. Introducing \[facility]\"" & HEALTH SERVICES! Dear Anne Billing: Thank you for requesting a [facility] account. Our records indicate that you already have an active [facility] account. You can access your account anytime at [email or web address] [email or web address]/[email or web address] Did you know that you can access your hospital and ER discharge instructions at any time in [facility]? You can also review all of your test results from your hospital stay or ER visit. Additional Information If you have questions, please visit the Frequently Asked Questions section of the [employer] website at [email or web address] [email or web address]/. Remember, [facility] is NOT to be used for urgent needs. For medical emergencies, dial 911. Now available from your iPhone and Android! Please provide this summary of care documentation to your next provider. Your primary care clinician is listed as CLOVIS MUNOZ. If you have any questions after today's visit, please call [phone].

## 2017-08-21 NOTE — PATIENT INSTRUCTIONS
Leg and Ankle Edema: Care Instructions  Your Care Instructions  Swelling in the legs, ankles, and feet is called edema. It is common after you sit or stand for a while. Long plane flights or car rides often cause swelling in the legs and feet. You may also have swelling if you have to stand for long periods of time at your job. Problems with the veins in the legs (varicose veins) and changes in hormones can also cause swelling. Sometimes the swelling in the ankles and feet is caused by a more serious problem, such as heart failure, infection, blood clots, or liver or kidney disease. Follow-up care is a key part of your treatment and safety. Be sure to make and go to all appointments, and call your doctor if you are having problems. Its also a good idea to know your test results and keep a list of the medicines you take. How can you care for yourself at home? · If your doctor gave you medicine, take it as prescribed. Call your doctor if you think you are having a problem with your medicine. · Whenever you are resting, raise your legs up. Try to keep the swollen area higher than the level of your heart. · Take breaks from standing or sitting in one position. ¨ Walk around to increase the blood flow in your lower legs. ¨ Move your feet and ankles often while you stand, or tighten and relax your leg muscles. · Wear support stockings. Put them on in the morning, before swelling gets worse. · Eat a balanced diet. Lose weight if you need to. · Limit the amount of salt (sodium) in your diet. Salt holds fluid in the body and may increase swelling. When should you call for help? Call 911 anytime you think you may need emergency care. For example, call if:  · You have symptoms of a blood clot in your lung (called a pulmonary embolism). These may include:  ¨ Sudden chest pain. ¨ Trouble breathing. ¨ Coughing up blood.   Call your doctor now or seek immediate medical care if:  · You have signs of a blood clot, such as:  ¨ Pain in your calf, back of the knee, thigh, or groin. ¨ Redness and swelling in your leg or groin. · You have symptoms of infection, such as:  ¨ Increased pain, swelling, warmth, or redness. ¨ Red streaks or pus. ¨ A fever. Watch closely for changes in your health, and be sure to contact your doctor if:  · Your swelling is getting worse. · You have new or worsening pain in your legs. · You do not get better as expected. Where can you learn more? Go to http://glenny-macho.info/. Enter L962 in the search box to learn more about \"Leg and Ankle Edema: Care Instructions. \"  Current as of: March 20, 2017  Content Version: 11.3  © 1239-8705 [employer]. Care instructions adapted under license by [employer] (which disclaims liability or warranty for this information). If you have questions about a medical condition or this instruction, always ask your healthcare professional. [place] [address] any warranty or liability for your use of this information.

## 2017-08-25 ENCOUNTER — HOSPITAL ENCOUNTER (OUTPATIENT)
Dept: VASCULAR SURGERY | Age: 57
Discharge: HOME | End: 2017-08-25
Attending: FAMILY MEDICINE
Payer: COMMERCIAL

## 2017-08-25 DIAGNOSIS — R60.0 BILATERAL LOWER EXTREMITY EDEMA: ICD-10-CM

## 2017-08-25 PROCEDURE — 93970 EXTREMITY STUDY: CPT

## 2017-08-25 NOTE — PROCEDURES
[address]  *** FINAL REPORT ***    Name: John León  MRN: [id number]    Outpatient  : [id number]  HIS Order #: [id number]  [address] [facility] Visit #: [id number]  Date: 25 Aug 2017    TYPE OF TEST: Peripheral Venous Testing    REASON FOR TEST  Pain in limb, Limb swelling    Right Leg:-  Deep venous thrombosis:           No  Superficial venous thrombosis:    No  Deep venous insufficiency:        Not examined  Superficial venous insufficiency: Not examined    Left Leg:-  Deep venous thrombosis:           No  Superficial venous thrombosis:    No  Deep venous insufficiency:        Not examined  Superficial venous insufficiency: Not examined      INTERPRETATION/FINDINGS  PROCEDURE:  Color duplex ultrasound imaging of lower extremity veins. FINDINGS:       Right: The common femoral, deep femoral, femoral, popliteal,  posterior tibial, peroneal, and great saphenous are patent and without   evidence of thrombus;  each is fully compressible and there is no  narrowing of the flow channel on color Doppler imaging. Phasic flow  is observed in the common femoral vein. Left:   The common femoral, deep femoral, femoral, popliteal,  posterior tibial, peroneal, and great saphenous are patent and without   evidence of thrombus;  each is fully compressible and there is no  narrowing of the flow channel on color Doppler imaging. Phasic flow  is observed in the common femoral vein. IMPRESSION:  No evidence of right or left lower extremity vein  thrombosis. ADDITIONAL COMMENTS    I have personally reviewed the data relevant to the interpretation of  this  study.     TECHNOLOGIST: Rafaela Mackey RVT  Signed: 2017 09:02 AM    PHYSICIAN: Antoine Mcclure., MD  Signed: 2017 07:06 AM

## 2017-08-28 DIAGNOSIS — Z12.39 SCREENING BREAST EXAMINATION: Primary | ICD-10-CM

## 2017-09-08 ENCOUNTER — HOSPITAL ENCOUNTER (OUTPATIENT)
Dept: MAMMOGRAPHY | Age: 57
Discharge: HOME | End: 2017-09-08
Attending: SURGERY
Payer: COMMERCIAL

## 2017-09-08 DIAGNOSIS — Z12.31 VISIT FOR SCREENING MAMMOGRAM: ICD-10-CM

## 2017-09-08 PROCEDURE — 77063 BREAST TOMOSYNTHESIS BI: CPT

## 2017-09-15 RX ORDER — ALPRAZOLAM 0.25 MG/1
TABLET ORAL
Qty: 60 TAB | Refills: 2 | OUTPATIENT
Start: 2017-09-17 | End: 2017-12-15 | Stop reason: SDUPTHER

## 2017-09-15 NOTE — TELEPHONE ENCOUNTER
Refill request:   Requested Prescriptions     Pending Prescriptions Disp Refills    ALPRAZolam (XANAX) 0.25 mg tablet 60 Tab 2

## 2017-09-15 NOTE — TELEPHONE ENCOUNTER
No problem on .   Last filled 8/19/17, last seen for controlled med check on 5/4 and due f/u Nov.  appt scheduled 11/3/17

## 2017-11-03 ENCOUNTER — OFFICE VISIT (OUTPATIENT)
Dept: FAMILY MEDICINE CLINIC | Age: 57
End: 2017-11-03

## 2017-11-03 VITALS
BODY MASS INDEX: 26.68 KG/M2 | HEART RATE: 74 BPM | SYSTOLIC BLOOD PRESSURE: 98 MMHG | HEIGHT: 66 IN | DIASTOLIC BLOOD PRESSURE: 60 MMHG | WEIGHT: 166 LBS | TEMPERATURE: 97.9 F

## 2017-11-03 DIAGNOSIS — F41.9 ANXIETY: ICD-10-CM

## 2017-11-03 DIAGNOSIS — F51.04 CHRONIC INSOMNIA: Primary | ICD-10-CM

## 2017-11-03 DIAGNOSIS — F41.0 PANIC DISORDER: ICD-10-CM

## 2017-11-03 DIAGNOSIS — Z51.81 ENCOUNTER FOR MEDICATION MONITORING: ICD-10-CM

## 2017-11-03 PROBLEM — R60.9 DEPENDENT EDEMA: Status: ACTIVE | Noted: 2017-11-03

## 2017-11-03 NOTE — PROGRESS NOTES
HISTORY OF PRESENT ILLNESS   HPI  6 month follow up medication check for insomnia or anxiety. Patient has h/o anxiety and related insomnia. Takes Xanax 1 1/2-2 tablets qhs most nights of the week. She has tried and failed Tylenol PM, Melatonin, Lunesta. Her previous psychiatrist has had her on Xanax which has been the most effective regimen for her bc it calms her mind, enables her to relax, settles her racy/panicky feelings at night so that she can fall asleep and stay asleep soundly at night w/o feeling \"hung over\" the next morning. She functions very well the next day. She has no new complaints or concerns at this time. Overall states she feels great. She exercises every day and does yoga breathing every evening prior to bedtime. REVIEW OF SYMPTOMS     Review of Systems   Respiratory: Negative. Cardiovascular: Negative. Neurological: Negative.     Psychiatric/Behavioral: Negative for depression.           PROBLEM LIST/MEDICAL HISTORY      Problem List  Date Reviewed: 11/3/2017          Codes Class Noted    Dependent Edema of BLE ICD-10-CM: R60.9  ICD-9-CM: 782.3  11/3/2017        Controlled substance agreement signed ICD-10-CM: Z79.899  ICD-9-CM: V58.69  5/4/2017    Overview Signed 5/4/2017  1:02 PM by Mario Yun MD     [id number]             Hypercholesterolemia ICD-10-CM: E78.00  ICD-9-CM: 272.0  5/4/2017        Screening exams for skin cancer ICD-10-CM: Z12.83  ICD-9-CM: V76.43  5/4/2017    Overview Signed 5/4/2017  1:07 PM by Mario Yun MD     Per Derm Dr Tiny Bustillos             Low back pain ICD-10-CM: M54.5  ICD-9-CM: 724.2  2/1/2017    Overview Signed 2/1/2017  8:33 AM by Mario Yun MD     Xrays 5-2016, normal, did PT             Recurrent cystitis ICD-10-CM: N30.90  ICD-9-CM: 595.9  4/28/2016        Chronic insomnia ICD-10-CM: F51.04  ICD-9-CM: 780.52  4/16/2015    Overview Addendum 4/28/2016 11:45 AM by Mario Yun MD     Psych rx'd [facility] worked really well, but insurance stopped covering             Postmenopause, LMP 48, No HRT ICD-10-CM: Z78.0  ICD-9-CM: V49.81  4/16/2015        Menopausal symptoms ICD-10-CM: N95.1  ICD-9-CM: 627.2  4/3/2014    Overview Signed 4/3/2014 12:01 PM by Jolynn Andersen MD     Gyn Dr Korin Monk, pt declined HRT             Vitamin D deficiency ICD-10-CM: E55.9  ICD-9-CM: 268.9  7/25/2011    Overview Signed 7/25/2011 10:54 PM by Jolynn Andersen MD     7/2011             Anxiety ICD-10-CM: F41.9  ICD-9-CM: 300.00  7/20/2011        H/O Post partum depression ICD-10-CM: F53  ICD-9-CM: 648.44, 016  7/20/2011    Overview Signed 7/20/2011  9:14 AM by Jolynn Andersen MD     1997             Psoriasis ICD-10-CM: L40.9  ICD-9-CM: 696.1  7/20/2011    Overview Signed 7/20/2011  9:18 AM by Jolynn Andersen MD     Derm Dr Mikal Florentino             Allergic reaction to nickel ICD-10-CM: L23.0  ICD-9-CM: 692.83  7/20/2011        Panic disorder ICD-10-CM: F41.0  ICD-9-CM: 300.01  5/19/2010    Overview Addendum 2/1/2017  8:33 AM by Jolynn Andersen MD     Psych Dr Noris Suarez (retired 2016)             Depression ICD-10-CM: F32.9  ICD-9-CM: 070  5/19/2010    Overview Addendum 2/1/2017  8:33 AM by Jolynn Andersen MD     1997; Psych Dr Dominique Snyder (retired 2016)             Colon polyp ICD-10-CM: S54.2  ICD-9-CM: 211.3  5/19/2010    Overview Signed 5/19/2010 10:06 AM by Fox Many     Benign              Family history of breast cancer ICD-10-CM: Z80.3  ICD-9-CM: V16.3  5/19/2010        Family history of diabetes mellitus (DM) ICD-10-CM: Z83.3  ICD-9-CM: V18.0  5/19/2010        Migraine ICD-10-CM: R23.859  ICD-9-CM: 346.90  5/19/2010    Overview Addendum 4/3/2014 11:40 AM by Jolynn Andersen MD     4/04;  Acute migraine w/ neuro changes summer 2013, [facility] ER, CT scan negative             Screening for cardiovascular condition ICD-10-CM: Z13.6  ICD-9-CM: V81.2  5/19/2010    Overview Signed 5/19/2010 10:09 AM by Susan Martinez (negative)                       PAST SURGICAL HISTORY       Past Surgical History:   Procedure Laterality Date    BIOPSY CERVIX  2003    negative    COLONOSCOPY  2002    benign polyp; Dr Roberta Jackson    successful    HX COLONOSCOPY  5/2005    normal, f/u 10 yrs, Dr Anu Clark  2009    negative    HX GI  2001    Nissen Fundoplication for severe reflux    HX HEMORRHOIDECTOMY  3/2002    HX MALIGNANT SKIN LESION EXCISION  1994    BCC excised right chest    XR CHEST PA LAT  10/2008    normal         MEDICATIONS      Current Outpatient Prescriptions   Medication Sig    ALPRAZolam (XANAX) 0.25 mg tablet TAKE 1-2 TABLETS BY MOUTH EVERY NIGHT AS NEEDED FOR ANXIETY  Do not fill until 9/17/17    cholecalciferol, vitamin D3, (VITAMIN D3) 2,000 unit tab Take  by mouth. Takes 1-2 x a week    furosemide (LASIX) 20 mg tablet Take 0.5-1 Tabs by mouth daily as needed. Take w/ small glass of OJ or 1/2 banana or other potassium rich food/beverage  Indications: Edema     No current facility-administered medications for this visit.            ALLERGIES     Allergies   Allergen Reactions    Latex Other (comments)    Adhesive Rash    Demerol [Meperidine] Other (comments)     GI    Morphine Other (comments)     Chest pain    Other Medication Other (comments)     Nickel          SOCIAL HISTORY       Social History     Social History    Marital status:      Spouse name: N/A    Number of children: 1    Years of education: N/A     Occupational History    Dental Hygenist, part time, 2 x a week      Social History Main Topics    Smoking status: Never Smoker    Smokeless tobacco: Never Used    Alcohol use No      Comment: none since 2-2016; usually very occ small glass of wine on special occasions now the past year, prior to that she had 1-2 glasses in the evenings a few x a week    Drug use: No    Sexual activity: Yes     Partners: Male     Other Topics Concern    Caffeine Concern No     1 small cup of coffee a day    Stress Concern No     does yoga breathing qpm    Weight Concern No     happy her wt is down from 178 to 163 w/ better diet and starting to exercise    Special Diet Yes     since 2017: cut out processed foods, more fruits and healthier food choices overall    Exercise Yes     walks 1/2 hr bid every day     Social History Narrative        IMMUNIZATIONS  Immunization History   Administered Date(s) Administered    Influenza Nasal Vaccine 2014    Tdap 2015         FAMILY HISTORY     Family History   Problem Relation Age of Onset    Breast Cancer Mother 46    Cancer Mother       of lung cancer, 59; smoker    Cancer Father       of lung cancer, 76; smoker    Diabetes Father     Heart Disease Brother       IHSS age 30    Heart Attack Brother     Hypertension Brother     Hypertension Brother     High Cholesterol Brother     Breast Cancer Maternal Grandmother 48    Heart Disease Maternal Grandmother 61    Diabetes Paternal Grandmother     Heart Disease Paternal Grandmother       CHF 76    Stroke Paternal Grandfather 79    Breast Cancer Maternal Aunt 36    Diabetes Paternal Aunt     Diabetes Paternal Uncle     Other Son      Asperger's Syndrome         VITALS     Visit Vitals    BP 98/60 (BP 1 Location: Left arm, BP Patient Position: Sitting)    Pulse 74    Temp 97.9 °F (36.6 °C) (Oral)    Ht 5' 6\" (1.676 m)    Wt 166 lb (75.3 kg)    LMP 2011    BMI 26.79 kg/m2          PHYSICAL EXAMINATION     Physical Exam   Constitutional: She is oriented to person, place, and time and well-developed, well-nourished, and in no distress. Cardiovascular: Normal rate, regular rhythm and normal heart sounds. Neurological: She is alert and oriented to person, place, and time. No cranial nerve deficit.  Gait normal. Coordination normal.   Psychiatric: Mood, memory, affect and judgment normal.   Happy, cheerful, upbeat   Vitals reviewed.              ASSESSMENT & PLAN       ICD-10-CM ICD-9-CM    1. Chronic insomnia related to anxiety F51.04 780.52    2. Anxiety F41.9 300.00    3. H/O Panic disorder F41.0 300.01    4. Encounter for medication monitoring Z51.81 V58.83       pulled and reviewed. No problems noted. Low abuse/misuse potential. Pt takes Xanax 1 1/2-2 tablets qhs most nights of the week. Last filled 10-18-17 for #60 and still has a refill left, pt brought in bottle today which coincides w/ her . Patient counseled about sleep hygiene, and we discussed controlled medications, effects, side effects, indications, risks vs benefits, precautions, potential interactions/dangers w/ other medications, abuse potential and the possible negative health implications/risks associated w/ overuse/abuse/misuse of controlled stimulant or sedating medications including overdose and death. Patient expressed understanding and agreement with current plan of care. CSA signed 5-2017. RTC for 6month follow up in April.  Follow up sooner prn

## 2017-11-03 NOTE — PATIENT INSTRUCTIONS

## 2017-11-03 NOTE — PROGRESS NOTES
Chief Complaint   Patient presents with    Medication Evaluation     controlled med check      1. Have you been to the ER, urgent care clinic since your last visit? Hospitalized since your last visit? No    2. Have you seen or consulted any other health care providers outside of the [address] [place] [address] since your last visit? Include any pap smears or colon screening.  No

## 2017-12-16 RX ORDER — ALPRAZOLAM 0.25 MG/1
TABLET ORAL
Qty: 60 TAB | Refills: 2 | OUTPATIENT
Start: 2017-12-16 | End: 2018-03-13 | Stop reason: SDUPTHER

## 2017-12-17 NOTE — TELEPHONE ENCOUNTER
Last OV 11-3-17  CSA signed 5-2017  Future Appointments:  4/6/2018   8:15 AM    MD ALVARO Dillard  5/11/2018  9:00 AM    MD ALVARO Dillard

## 2018-03-13 ENCOUNTER — TELEPHONE (OUTPATIENT)
Dept: FAMILY MEDICINE CLINIC | Age: 58
End: 2018-03-13

## 2018-03-13 ENCOUNTER — OFFICE VISIT (OUTPATIENT)
Dept: FAMILY MEDICINE CLINIC | Age: 58
End: 2018-03-13

## 2018-03-13 VITALS
WEIGHT: 168 LBS | TEMPERATURE: 98.2 F | HEART RATE: 79 BPM | SYSTOLIC BLOOD PRESSURE: 102 MMHG | BODY MASS INDEX: 27 KG/M2 | RESPIRATION RATE: 18 BRPM | OXYGEN SATURATION: 97 % | DIASTOLIC BLOOD PRESSURE: 60 MMHG | HEIGHT: 66 IN

## 2018-03-13 DIAGNOSIS — F41.9 INSOMNIA SECONDARY TO ANXIETY: ICD-10-CM

## 2018-03-13 DIAGNOSIS — F51.05 INSOMNIA SECONDARY TO ANXIETY: ICD-10-CM

## 2018-03-13 DIAGNOSIS — R30.9 URINARY PAIN: Primary | ICD-10-CM

## 2018-03-13 DIAGNOSIS — N39.0 URINARY TRACT INFECTION WITH HEMATURIA, SITE UNSPECIFIED: ICD-10-CM

## 2018-03-13 DIAGNOSIS — R39.15 URINARY URGENCY: ICD-10-CM

## 2018-03-13 DIAGNOSIS — R31.9 URINARY TRACT INFECTION WITH HEMATURIA, SITE UNSPECIFIED: ICD-10-CM

## 2018-03-13 DIAGNOSIS — Z79.899 CONTROLLED SUBSTANCE AGREEMENT SIGNED: ICD-10-CM

## 2018-03-13 LAB
BILIRUB UR QL STRIP: NEGATIVE
GLUCOSE UR-MCNC: NEGATIVE MG/DL
KETONES P FAST UR STRIP-MCNC: NEGATIVE MG/DL
PH UR STRIP: 7.5 [PH] (ref 4.6–8)
PROT UR QL STRIP: NEGATIVE
SP GR UR STRIP: 1.01 (ref 1–1.03)
UA UROBILINOGEN AMB POC: NORMAL (ref 0.2–1)
URINALYSIS CLARITY POC: CLEAR
URINALYSIS COLOR POC: NORMAL
URINE BLOOD POC: NORMAL
URINE LEUKOCYTES POC: NORMAL
URINE NITRITES POC: NEGATIVE

## 2018-03-13 RX ORDER — CIPROFLOXACIN 500 MG/1
500 TABLET ORAL 2 TIMES DAILY
Qty: 6 TAB | Refills: 0 | Status: SHIPPED | OUTPATIENT
Start: 2018-03-13 | End: 2018-03-16

## 2018-03-13 RX ORDER — ALPRAZOLAM 0.25 MG/1
TABLET ORAL
Qty: 60 TAB | Refills: 2 | Status: SHIPPED | OUTPATIENT
Start: 2018-03-13 | End: 2018-06-18 | Stop reason: SDUPTHER

## 2018-03-13 NOTE — PROGRESS NOTES
HISTORY OF PRESENT ILLNESS   Bladder Infection    The history is provided by the patient. This is a new problem. The current episode started yesterday. The problem occurs every urination. The problem has not changed since onset. The quality of the pain is described as burning. There has been no fever. She is sexually active. There is no history of pyelonephritis. Associated symptoms include hematuria (one epsiode w/ voiding earlier this AM) and urgency. Pertinent negatives include no chills, no sweats, no nausea, no vomiting, no frequency, no flank pain, no vaginal discharge and no abdominal pain. The patient is not pregnant. She has tried increased fluids for the symptoms. The treatment provided mild relief. Her past medical history is significant for recurrent UTIs (used to get UTI's ~ 1-2 x a year, last one was 3-2015 w/ culture + UA and good response to Cipro w/ her last 2 UTI's). Her past medical history does not include urological procedure (had bladder and renal US in 2014 but no instrumentation). Follow up controlled med check  6 month follow up medication check on Xanax for insomnia related to anxiety. Patient has h/o anxiety and related insomnia. Takes Xanax 1 1/2-2 tablets qhs most nights of the week. She has tried and failed Tylenol PM, Melatonin, Lunesta. Her previous psychiatrist has had her on Xanax which has been the most effective regimen for her bc it calms her mind, enables her to relax, settles her racy/panicky feelings at night so that she can fall asleep and stay asleep soundly at night w/o feeling \"hung over\" the next morning. She functions very well the next day.    She has no new complaints or concerns at this time. Overall states she feels great. She exercises every day and does yoga breathing every evening prior to bedtime. REVIEW OF SYMPTOMS     Review of Systems   Constitutional: Negative. Negative for chills and fever. Respiratory: Negative. Cardiovascular: Negative. Gastrointestinal: Negative for abdominal pain, nausea and vomiting. Genitourinary: Positive for dysuria, hematuria (one epsiode w/ voiding earlier this AM) and urgency. Negative for flank pain, frequency and vaginal discharge. Neurological: Negative.     Psychiatric/Behavioral: Negative for depression.           PROBLEM LIST/MEDICAL HISTORY      Problem List  Date Reviewed: 3/13/2018          Codes Class Noted    Dependent Edema of BLE ICD-10-CM: R60.9  ICD-9-CM: 782.3  11/3/2017        Controlled substance agreement signed ICD-10-CM: Z79.899  ICD-9-CM: V58.69  5/4/2017    Overview Addendum 3/13/2018  3:14 PM by Andreina De La Fuente MD     [id number], 3-2018             Hypercholesterolemia ICD-10-CM: E78.00  ICD-9-CM: 272.0  5/4/2017        Screening exams for skin cancer ICD-10-CM: Z12.83  ICD-9-CM: V76.43  5/4/2017    Overview Signed 5/4/2017  1:07 PM by Andreina De La Fuente MD     Per Derm Dr Anastasia Mckeon             Low back pain ICD-10-CM: M54.5  ICD-9-CM: 724.2  2/1/2017    Overview Signed 2/1/2017  8:33 AM by Andreina De La Fuente MD     Xrays 5-2016, normal, did PT             Recurrent cystitis ICD-10-CM: N30.90  ICD-9-CM: 595.9  4/28/2016        Chronic insomnia ICD-10-CM: F51.04  ICD-9-CM: 780.52  4/16/2015    Overview Addendum 4/28/2016 11:45 AM by Andreina De La Fuente MD     Psych rx'd Neoma Poll worked really well, but insurance stopped covering             Postmenopause, LMP 48, No HRT ICD-10-CM: Z78.0  ICD-9-CM: V49.81  4/16/2015        Menopausal symptoms ICD-10-CM: N95.1  ICD-9-CM: 627.2  4/3/2014    Overview Signed 4/3/2014 12:01 PM by Andreina De La Fuente MD     Gyn Dr Carloz Roberson, pt declined HRT             Vitamin D deficiency ICD-10-CM: E55.9  ICD-9-CM: 268.9  7/25/2011    Overview Signed 7/25/2011 10:54 PM by Andreina De La Fuente MD     7/2011             Anxiety ICD-10-CM: F41.9  ICD-9-CM: 300.00  7/20/2011        H/O Post partum depression ICD-10-CM: F53  ICD-9-CM: 195.52, 311  7/20/2011 Overview Signed 7/20/2011  9:14 AM by Javier Mercado MD     1997             Psoriasis ICD-10-CM: L40.9  ICD-9-CM: 696.1  7/20/2011    Overview Signed 7/20/2011  9:18 AM by Javier Mercado MD     Derm Dr Darling Andrade             Allergic reaction to nickel ICD-10-CM: L23.0  ICD-9-CM: 692.83  7/20/2011        Panic disorder ICD-10-CM: F41.0  ICD-9-CM: 300.01  5/19/2010    Overview Addendum 2/1/2017  8:33 AM by Javier Mercado MD     Psych Dr Ulices Harrell (retired 2016)             Depression ICD-10-CM: F32.9  ICD-9-CM: 558  5/19/2010    Overview Addendum 2/1/2017  8:33 AM by Javier Mercado MD     1997; Psych Dr Elaine Lopez (retired 2016)             Colon polyp ICD-10-CM: K63.5  ICD-9-CM: 211.3  5/19/2010    Overview Signed 5/19/2010 10:06 AM by Josh Vera     Benign              Family history of breast cancer ICD-10-CM: Z80.3  ICD-9-CM: V16.3  5/19/2010        Family history of diabetes mellitus (DM) ICD-10-CM: Z83.3  ICD-9-CM: V18.0  5/19/2010        Migraine ICD-10-CM: P18.264  ICD-9-CM: 346.90  5/19/2010    Overview Addendum 4/3/2014 11:40 AM by Javier Mercado MD     4/04;  Acute migraine w/ neuro changes summer 2013, [facility] ER, CT scan negative             Screening for cardiovascular condition ICD-10-CM: Z13.6  ICD-9-CM: V81.2  5/19/2010    Overview Signed 5/19/2010 10:09 AM by Josh Vera     For IHSS (negative)                       PAST SURGICAL HISTORY       Past Surgical History:   Procedure Laterality Date    BIOPSY CERVIX  2003    negative    COLONOSCOPY  2002    benign polyp; Dr Luque    successful    HX COLONOSCOPY  5/2005    normal, f/u 10 yrs, Dr Jeanmarie Arteaga HX COLONOSCOPY  03/09/2018    polypectomy x 2, repeat 5 yrs, Dr. Emeterio Michel  2009    negative    HX GI  2001    Nissen Fundoplication for severe reflux    HX HEMORRHOIDECTOMY  3/2002    HX MALIGNANT SKIN LESION EXCISION  1994    BCC excised right chest    XR CHEST PA LAT  10/2008    normal         MEDICATIONS      Current Outpatient Prescriptions   Medication Sig    ALPRAZolam (XANAX) 0.25 mg tablet TAKE 1-2 TABLETS BY MOUTH EVERY NIGHT AS NEEDED FOR ANXIETY    cholecalciferol, vitamin D3, (VITAMIN D3) 2,000 unit tab Take  by mouth. Takes 1-2 x a week    furosemide (LASIX) 20 mg tablet Take 0.5-1 Tabs by mouth daily as needed. Take w/ small glass of OJ or 1/2 banana or other potassium rich food/beverage  Indications: Edema     No current facility-administered medications for this visit.            ALLERGIES     Allergies   Allergen Reactions    Latex Other (comments)    Adhesive Rash    Demerol [Meperidine] Other (comments)     GI    Morphine Other (comments)     Chest pain    Other Medication Other (comments)     Nickel          SOCIAL HISTORY       Social History     Social History    Marital status:      Spouse name: N/A    Number of children: 1    Years of education: N/A     Occupational History    Dental Hygenist, part time, 2 x a week      Social History Main Topics    Smoking status: Never Smoker    Smokeless tobacco: Never Used    Alcohol use No      Comment: none since 2-2016; usually very occ small glass of wine on special occasions now the past year, prior to that she had 1-2 glasses in the evenings a few x a week    Drug use: No    Sexual activity: Yes     Partners: Male     Other Topics Concern    Caffeine Concern No     1 small cup of coffee a day    Stress Concern No     does yoga breathing qpm    Weight Concern No     happy her wt is down from 178 to 163 w/ better diet and starting to exercise    Special Diet Yes     since Jan 2017: cut out processed foods, more fruits and healthier food choices overall    Exercise Yes     walks 1/2 hr bid every day     Social History Narrative        IMMUNIZATIONS  Immunization History   Administered Date(s) Administered    Influenza Nasal Vaccine 11/02/2014    Tdap 08/26/2015 FAMILY HISTORY     Family History   Problem Relation Age of Onset    Breast Cancer Mother 46    Cancer Mother       of lung cancer, 59; smoker    Cancer Father       of lung cancer, 76; smoker    Diabetes Father     Heart Disease Brother       [de-identified] age 30    Heart Attack Brother     Hypertension Brother     Hypertension Brother     High Cholesterol Brother     Breast Cancer Maternal Grandmother 48    Heart Disease Maternal Grandmother 61    Diabetes Paternal Grandmother     Heart Disease Paternal Grandmother       CHF 76    Stroke Paternal Grandfather 79    Breast Cancer Maternal Aunt 36    Diabetes Paternal Aunt     Diabetes Paternal Uncle     Other Son      Asperger's Syndrome         VITALS     Visit Vitals    /60 (BP 1 Location: Left arm, BP Patient Position: Sitting)    Pulse 79    Temp 98.2 °F (36.8 °C) (Oral)    Resp 18    Ht 5' 6\" (1.676 m)    Wt 168 lb (76.2 kg)    LMP 2011    SpO2 97%    BMI 27.12 kg/m2          PHYSICAL EXAMINATION     Physical Exam   Constitutional: She is oriented to person, place, and time. No distress. Cardiovascular: Normal rate and regular rhythm. Pulmonary/Chest: Effort normal.   Abdominal: Soft. She exhibits no distension. There is tenderness (mild) in the suprapubic area. Neurological: She is alert and oriented to person, place, and time. Skin: Skin is warm and dry.    Psychiatric: Mood, memory, affect and judgment normal.   Vitals reviewed.          DIAGNOSTIC DATA         LABORATORY DATA       Results for orders placed or performed in visit on 18   AMB POC URINALYSIS DIP STICK AUTO W/O MICRO     Status: None   Result Value Ref Range Status    Color (UA POC) Light Yellow  Final    Clarity (UA POC) Clear  Final    Glucose (UA POC) Negative Negative Final    Bilirubin (UA POC) Negative Negative Final    Ketones (UA POC) Negative Negative Final    Specific gravity (UA POC) 1.015 1.001 - 1.035 Final Blood (UA POC) 2+ Negative Final    pH (UA POC) 7.5 4.6 - 8.0 Final    Protein (UA POC) Negative Negative Final    Urobilinogen (UA POC) 0.2 mg/dL 0.2 - 1 Final    Nitrites (UA POC) Negative Negative Final    Leukocyte esterase (UA POC) 1+ Negative Final          ASSESSMENT & PLAN       ICD-10-CM ICD-9-CM    1. Urinary pain R30.9 788.1 AMB POC URINALYSIS DIP STICK AUTO W/O MICRO   2. Urinary urgency R39.15 788.63    3. Urinary tract infection with hematuria, site unspecified N39.0 599.0 ciprofloxacin HCl (CIPRO) 500 mg tablet    R31.9  CULTURE, URINE   4. Insomnia secondary to anxiety, medication monitoring F41.9 300.00 ALPRAZolam (XANAX) 0.25 mg tablet    F51.05 327.02    5. Controlled substance agreement signed Z79.899 V58.69        Urine sent for C&S  Start Cipro 500 mg bid, good response to this historically  Ok to use OTC Azo  Push water and cranberry juice  Reviewed medications and side effects   F/U INI, sooner prn worsening symptoms       pulled and reviewed. No problems noted. Low abuse/misuse potential. Patient counseled and we discussed controlled medications, effects, side effects, indications, risks vs benefits, precautions, potential interactions/dangers w/ other medications, abuse potential and the possible negative health implications/risks associated w/ overuse/abuse/misuse of controlled stimulant or sedating medications including overdose and death. Patient expressed understanding and agreement with current plan of care. Controlled Substance Agreement reviewed, signed, copy filed to scan, pt declined copy, has the original from last year. Patient scheduled for CPE in May 2018. Follow up sooner prn. Patient counseled about current BMI, goals, diet, nutrition, exercise, weight management. Nutrition/meal planning discussed. Monitor weights. Appropriate patient education materials included with or without corresponding AVS via handout or LiveProfilehart if activated. Reassess at next follow up visit.

## 2018-03-13 NOTE — TELEPHONE ENCOUNTER
Patient is calling, she states that she believes that she may have a UTI (SX: blood in urine, pain when urinating, no fever) from a recent colonoscopy She called the surgeon and they advised that she be seen by her PCP today, the patient has an appointment at 2:45pm but would like to know if she can take something for the pain, she has an OTC RX called urinary pain relief and would like to know if this would effect her results when she comes in later on today.     Best call back # for patient: [phone]

## 2018-03-13 NOTE — PROGRESS NOTES
Chief Complaint   Patient presents with    Urinary Burning    Urinary Pain     with small amout of hematuria      1. Have you been to the ER, urgent care clinic since your last visit? Hospitalized since your last visit? No    2. Have you seen or consulted any other health care providers outside of the [address] [place] [address] since your last visit? Include any pap smears or colon screening.    Yes Dr Conner Armendariz

## 2018-03-13 NOTE — TELEPHONE ENCOUNTER
Advised patient taking urinary pain relief could alter the urine dipstick here in the office, however, it should not alter the culture and sensitivity done at the lab  If she cannot stand the pain til the appointment, go ahead and take but if she could hold off it would be better.

## 2018-03-13 NOTE — PATIENT INSTRUCTIONS
Body Mass Index: Care Instructions  Your Care Instructions    Body mass index (BMI) can help you see if your weight is raising your risk for health problems. It uses a formula to compare how much you weigh with how tall you are. · A BMI lower than 18.5 is considered underweight. · A BMI between 18.5 and 24.9 is considered healthy. · A BMI between 25 and 29.9 is considered overweight. A BMI of 30 or higher is considered obese. If your BMI is in the normal range, it means that you have a lower risk for weight-related health problems. If your BMI is in the overweight or obese range, you may be at increased risk for weight-related health problems, such as high blood pressure, heart disease, stroke, arthritis or joint pain, and diabetes. If your BMI is in the underweight range, you may be at increased risk for health problems such as fatigue, lower protection (immunity) against illness, muscle loss, bone loss, hair loss, and hormone problems. BMI is just one measure of your risk for weight-related health problems. You may be at higher risk for health problems if you are not active, you eat an unhealthy diet, or you drink too much alcohol or use tobacco products. Follow-up care is a key part of your treatment and safety. Be sure to make and go to all appointments, and call your doctor if you are having problems. It's also a good idea to know your test results and keep a list of the medicines you take. How can you care for yourself at home? · Practice healthy eating habits. This includes eating plenty of fruits, vegetables, whole grains, lean protein, and low-fat dairy. · If your doctor recommends it, get more exercise. Walking is a good choice. Bit by bit, increase the amount you walk every day. Try for at least 30 minutes on most days of the week. · Do not smoke. Smoking can increase your risk for health problems. If you need help quitting, talk to your doctor about stop-smoking programs and medicines. These can increase your chances of quitting for good. · Limit alcohol to 2 drinks a day for men and 1 drink a day for women. Too much alcohol can cause health problems. If you have a BMI higher than 25  · Your doctor may do other tests to check your risk for weight-related health problems. This may include measuring the distance around your waist. A waist measurement of more than 40 inches in men or 35 inches in women can increase the risk of weight-related health problems. · Talk with your doctor about steps you can take to stay healthy or improve your health. You may need to make lifestyle changes to lose weight and stay healthy, such as changing your diet and getting regular exercise. If you have a BMI lower than 18.5  · Your doctor may do other tests to check your risk for health problems. · Talk with your doctor about steps you can take to stay healthy or improve your health. You may need to make lifestyle changes to gain or maintain weight and stay healthy, such as getting more healthy foods in your diet and doing exercises to build muscle. Where can you learn more? Go to http://glenny-macho.info/. Enter S176 in the search box to learn more about \"Body Mass Index: Care Instructions. \"  Current as of: October 13, 2016  Content Version: 11.4  © 3501-4709 [employer]. Care instructions adapted under license by [employer] (which disclaims liability or warranty for this information). If you have questions about a medical condition or this instruction, always ask your healthcare professional. [place] [address] any warranty or liability for your use of this information. Urinary Tract Infection in Women: Care Instructions  Your Care Instructions    A urinary tract infection, or UTI, is a general term for an infection anywhere between the kidneys and the urethra (where urine comes out). Most UTIs are bladder infections.  They often cause pain or burning when you urinate. UTIs are caused by bacteria and can be cured with antibiotics. Be sure to complete your treatment so that the infection goes away. Follow-up care is a key part of your treatment and safety. Be sure to make and go to all appointments, and call your doctor if you are having problems. It's also a good idea to know your test results and keep a list of the medicines you take. How can you care for yourself at home? · Take your antibiotics as directed. Do not stop taking them just because you feel better. You need to take the full course of antibiotics. · Drink extra water and other fluids for the next day or two. This may help wash out the bacteria that are causing the infection. (If you have kidney, heart, or liver disease and have to limit fluids, talk with your doctor before you increase your fluid intake.)  · Avoid drinks that are carbonated or have caffeine. They can irritate the bladder. · Urinate often. Try to empty your bladder each time. · To relieve pain, take a hot bath or lay a heating pad set on low over your lower belly or genital area. Never go to sleep with a heating pad in place. To prevent UTIs  · Drink plenty of water each day. This helps you urinate often, which clears bacteria from your system. (If you have kidney, heart, or liver disease and have to limit fluids, talk with your doctor before you increase your fluid intake.)  · Urinate when you need to. · Urinate right after you have sex. · Change sanitary pads often. · Avoid douches, bubble baths, feminine hygiene sprays, and other feminine hygiene products that have deodorants. · After going to the bathroom, wipe from front to back. When should you call for help? Call your doctor now or seek immediate medical care if:  ? · Symptoms such as fever, chills, nausea, or vomiting get worse or appear for the first time. ? · You have new pain in your back just below your rib cage. This is called flank pain. ? · There is new blood or pus in your urine. ? · You have any problems with your antibiotic medicine. ? Watch closely for changes in your health, and be sure to contact your doctor if:  ? · You are not getting better after taking an antibiotic for 2 days. ? · Your symptoms go away but then come back. Where can you learn more? Go to http://glenny-macho.info/. Enter N361 in the search box to learn more about \"Urinary Tract Infection in Women: Care Instructions. \"  Current as of: May 12, 2017  Content Version: 11.4  © 8618-0781 [employer]. Care instructions adapted under license by [employer] (which disclaims liability or warranty for this information). If you have questions about a medical condition or this instruction, always ask your healthcare professional. [address] any warranty or liability for your use of this information.

## 2018-03-14 LAB — BACTERIA UR CULT: NO GROWTH

## 2018-03-15 ENCOUNTER — TELEPHONE (OUTPATIENT)
Dept: FAMILY MEDICINE CLINIC | Age: 58
End: 2018-03-15

## 2018-03-15 DIAGNOSIS — R31.9 URINARY TRACT INFECTION WITH HEMATURIA, SITE UNSPECIFIED: Primary | ICD-10-CM

## 2018-03-15 DIAGNOSIS — N39.0 URINARY TRACT INFECTION WITH HEMATURIA, SITE UNSPECIFIED: Primary | ICD-10-CM

## 2018-03-16 NOTE — TELEPHONE ENCOUNTER
Spoke with patient regarding results. States she is just  Having pelvic pressure at this time. She is going to finish the Cipro and will come back for a repeat U/A in a week.

## 2018-05-03 ENCOUNTER — TELEPHONE (OUTPATIENT)
Dept: FAMILY MEDICINE CLINIC | Age: 58
End: 2018-05-03

## 2018-05-03 DIAGNOSIS — Z00.00 ROUTINE GENERAL MEDICAL EXAMINATION AT A HEALTH CARE FACILITY: Primary | ICD-10-CM

## 2018-05-03 DIAGNOSIS — E55.9 VITAMIN D DEFICIENCY: ICD-10-CM

## 2018-05-03 DIAGNOSIS — E78.00 HYPERCHOLESTEROLEMIA: ICD-10-CM

## 2018-05-03 NOTE — TELEPHONE ENCOUNTER
Pended same orders from last years CPE-wants to get done tomorrow so results can be discussed next week at her CPE visit

## 2018-05-03 NOTE — TELEPHONE ENCOUNTER
----- Message from Kapture sent at 5/3/2018  8:15 AM EDT -----  Regarding: Dr. Anna Torres  Pt is requesting a call back regarding scheduling an appt for her lab work.     Best contact number [phone]

## 2018-05-03 NOTE — TELEPHONE ENCOUNTER
----- Message from Cat Reid sent at 5/3/2018  9:18 AM EDT -----  Regarding: Dr. Juan Leonard / Telephone  Pt is requesting a call from the nurse regarding blood work being ordered by 8:15a for tomorrow morning. Pt is calling to verify to make sure this was called in.    Best contact: [phone]

## 2018-05-05 LAB
25(OH)D3+25(OH)D2 SERPL-MCNC: 28.8 NG/ML (ref 30–100)
ALBUMIN SERPL-MCNC: 4.5 G/DL (ref 3.5–5.5)
ALBUMIN/GLOB SERPL: 2 {RATIO} (ref 1.2–2.2)
ALP SERPL-CCNC: 62 IU/L (ref 39–117)
ALT SERPL-CCNC: 25 IU/L (ref 0–32)
APPEARANCE UR: CLEAR
AST SERPL-CCNC: 23 IU/L (ref 0–40)
BACTERIA #/AREA URNS HPF: NORMAL /[HPF]
BILIRUB SERPL-MCNC: 0.5 MG/DL (ref 0–1.2)
BILIRUB UR QL STRIP: NEGATIVE
BUN SERPL-MCNC: 17 MG/DL (ref 6–24)
BUN/CREAT SERPL: 20 (ref 9–23)
CALCIUM SERPL-MCNC: 9.6 MG/DL (ref 8.7–10.2)
CASTS URNS QL MICRO: NORMAL /LPF
CHLORIDE SERPL-SCNC: 100 MMOL/L (ref 96–106)
CHOLEST SERPL-MCNC: 227 MG/DL (ref 100–199)
CO2 SERPL-SCNC: 27 MMOL/L (ref 18–29)
COLOR UR: YELLOW
CREAT SERPL-MCNC: 0.83 MG/DL (ref 0.57–1)
EPI CELLS #/AREA URNS HPF: NORMAL /HPF
ERYTHROCYTE [DISTWIDTH] IN BLOOD BY AUTOMATED COUNT: 13.2 % (ref 12.3–15.4)
GFR SERPLBLD CREATININE-BSD FMLA CKD-EPI: 79 ML/MIN/1.73
GFR SERPLBLD CREATININE-BSD FMLA CKD-EPI: 91 ML/MIN/1.73
GLOBULIN SER CALC-MCNC: 2.3 G/DL (ref 1.5–4.5)
GLUCOSE SERPL-MCNC: 85 MG/DL (ref 65–99)
GLUCOSE UR QL: NEGATIVE
HCT VFR BLD AUTO: 41 % (ref 34–46.6)
HDLC SERPL-MCNC: 66 MG/DL
HGB BLD-MCNC: 13.7 G/DL (ref 11.1–15.9)
HGB UR QL STRIP: NEGATIVE
INTERPRETATION, 910389: NORMAL
KETONES UR QL STRIP: NEGATIVE
LDLC SERPL CALC-MCNC: 148 MG/DL (ref 0–99)
LEUKOCYTE ESTERASE UR QL STRIP: NEGATIVE
MCH RBC QN AUTO: 32.2 PG (ref 26.6–33)
MCHC RBC AUTO-ENTMCNC: 33.4 G/DL (ref 31.5–35.7)
MCV RBC AUTO: 97 FL (ref 79–97)
MICRO URNS: NORMAL
MICRO URNS: NORMAL
NITRITE UR QL STRIP: NEGATIVE
PH UR STRIP: 7 [PH] (ref 5–7.5)
PLATELET # BLD AUTO: 290 X10E3/UL (ref 150–379)
POTASSIUM SERPL-SCNC: 5 MMOL/L (ref 3.5–5.2)
PROT SERPL-MCNC: 6.8 G/DL (ref 6–8.5)
PROT UR QL STRIP: NEGATIVE
RBC # BLD AUTO: 4.25 X10E6/UL (ref 3.77–5.28)
RBC #/AREA URNS HPF: NORMAL /HPF
SODIUM SERPL-SCNC: 139 MMOL/L (ref 134–144)
SP GR UR: 1.02 (ref 1–1.03)
TRIGL SERPL-MCNC: 65 MG/DL (ref 0–149)
TSH SERPL DL<=0.005 MIU/L-ACNC: 1.29 UIU/ML (ref 0.45–4.5)
URINALYSIS REFLEX, 377202: NORMAL
UROBILINOGEN UR STRIP-MCNC: 0.2 MG/DL (ref 0.2–1)
VLDLC SERPL CALC-MCNC: 13 MG/DL (ref 5–40)
WBC # BLD AUTO: 3.6 X10E3/UL (ref 3.4–10.8)
WBC #/AREA URNS HPF: NORMAL /HPF

## 2018-05-11 ENCOUNTER — OFFICE VISIT (OUTPATIENT)
Dept: FAMILY MEDICINE CLINIC | Age: 58
End: 2018-05-11

## 2018-05-11 VITALS
BODY MASS INDEX: 27.64 KG/M2 | RESPIRATION RATE: 18 BRPM | HEART RATE: 62 BPM | SYSTOLIC BLOOD PRESSURE: 108 MMHG | OXYGEN SATURATION: 98 % | DIASTOLIC BLOOD PRESSURE: 76 MMHG | HEIGHT: 66 IN | WEIGHT: 172 LBS | TEMPERATURE: 98.1 F

## 2018-05-11 DIAGNOSIS — E78.00 HYPERCHOLESTEROLEMIA: ICD-10-CM

## 2018-05-11 DIAGNOSIS — Z00.00 ROUTINE GENERAL MEDICAL EXAMINATION AT A HEALTH CARE FACILITY: Primary | ICD-10-CM

## 2018-05-11 NOTE — PROGRESS NOTES
Chief Complaint   Patient presents with    Complete Physical     labs done and patient fasting. 1. Have you been to the ER, urgent care clinic since your last visit? Hospitalized since your last visit? No    2. Have you seen or consulted any other health care providers outside of the [facility] since your last visit? Include any pap smears or colon screening.  No

## 2018-05-11 NOTE — PROGRESS NOTES
HISTORY OF PRESENT ILLNESS   HPI  Annual CPE, had fasting labs done 5-4-18. Overall has been getting along pretty well. Stress/emotional eating the past 2 months bc her best friend has end stage breast cancer and is not doing well. She has been caring for her, so patient snacks more in the evenings when she gets home. But she still is making sure that she gets regular exercise every day. Her wt is up about 4 lbs but she is optimistic she can get it back down once she starts eating healthier and cuts back on all the snacking. States she is \"naturally sad\" bc her best friend is dying of cancer, but states she doesn't feel depressed. She still takes her Xanax nightly anywhere from 1-1 1/2 or at most 2 tabs (2 days a week on work nights). REVIEW OF SYMPTOMS     Review of Systems   Constitutional: Negative. HENT: Negative. Eyes: Negative. Respiratory: Negative. Cardiovascular: Negative. Gastrointestinal: Negative. Genitourinary: Negative. Musculoskeletal: Negative. Skin: Negative. Sees Dermatologist routinely for skin checks and cancer screenings   Neurological: Negative. Endo/Heme/Allergies: Negative.     Psychiatric/Behavioral: Negative for depression.           PROBLEM LIST/MEDICAL HISTORY      Problem List  Date Reviewed: 5/11/2018          Codes Class Noted    Dependent Edema of BLE ICD-10-CM: R60.9  ICD-9-CM: 782.3  11/3/2017        Controlled substance agreement signed ICD-10-CM: Z79.899  ICD-9-CM: V58.69  5/4/2017    Overview Addendum 3/13/2018  3:14 PM by [address], [place]     [id number], 3-2018             Hypercholesterolemia ICD-10-CM: E78.00  ICD-9-CM: 272.0  5/4/2017        Screening exams for skin cancer ICD-10-CM: Z12.83  ICD-9-CM: V76.43  5/4/2017    Overview Signed 5/4/2017  1:07 PM by [address], [place]     Per Derm Dr Minerva Ambrosio             Low back pain ICD-10-CM: M54.5  ICD-9-CM: 724.2  2/1/2017    Overview Signed 2/1/2017  8:33 AM by Jose Melendez Maria Teresa Osman MD     Xrays 1-2068, normal, did PT             Recurrent cystitis ICD-10-CM: N30.90  ICD-9-CM: 595.9  4/28/2016    Overview Signed 3/13/2018  3:29 PM by [address], [place]      2014             Chronic insomnia ICD-10-CM: F51.04  ICD-9-CM: 780.52  4/16/2015    Overview Addendum 4/28/2016 11:45 AM by [address], [place]     Psych rx'd Zaid Leah worked really well, but insurance stopped covering             Postmenopause, LMP 48, No HRT ICD-10-CM: Z78.0  ICD-9-CM: V49.81  4/16/2015        Menopausal symptoms ICD-10-CM: N95.1  ICD-9-CM: 627.2  4/3/2014    Overview Signed 4/3/2014 12:01 PM by [address], [place]     Gyn Dr Shaka Chen, pt declined HRT             Vitamin D deficiency ICD-10-CM: E55.9  ICD-9-CM: 268.9  7/25/2011    Overview Signed 7/25/2011 10:54 PM by [address], [place]     7/2011             Anxiety ICD-10-CM: F41.9  ICD-9-CM: 300.00  7/20/2011        H/O Post partum depression ICD-10-CM: F53  ICD-9-CM: 648.44, 262  7/20/2011    Overview Signed 7/20/2011  9:14 AM by [address], [place]     1997             Psoriasis ICD-10-CM: L40.9  ICD-9-CM: 696.1  7/20/2011    Overview Signed 7/20/2011  9:18 AM by [address], [place]     Derm Dr Murtaza Tyson             Allergic reaction to nickel ICD-10-CM: L23.0  ICD-9-CM: 692.83  7/20/2011        Panic disorder ICD-10-CM: F41.0  ICD-9-CM: 300.01  5/19/2010    Overview Addendum 2/1/2017  8:33 AM by [address], [place]     Psych Dr Dorian Estrada (retired 2016)             Depression ICD-10-CM: F32.9  ICD-9-CM: 675  5/19/2010    Overview Addendum 2/1/2017  8:33 AM by [address] [place] [address] [address], [place]     1997; Psych Dr Fredis Cano (retired 2016)             Colon polyp ICD-10-CM: Y91.3  ICD-9-CM: 211.3  5/19/2010    Overview Signed 5/19/2010 10:06 AM by Spike Moran     Benign              Family history of breast cancer ICD-10-CM: Z80.3  ICD-9-CM: V16.3  5/19/2010        Family history of diabetes mellitus (DM) ICD-10-CM: Z83.3  ICD-9-CM: V18.0  5/19/2010        Migraine ICD-10-CM: R27.954  ICD-9-CM: 346.90  5/19/2010    Overview Addendum 4/3/2014 11:40 AM by Daksha Venegas MD     4/04; Acute migraine w/ neuro changes summer 2013, [facility] ER, CT scan negative             Screening for cardiovascular condition ICD-10-CM: Z13.6  ICD-9-CM: V81.2  5/19/2010    Overview Signed 5/19/2010 10:09 AM by Yael Rothman     For IHSS (negative)                       PAST SURGICAL HISTORY       Past Surgical History:   Procedure Laterality Date    BIOPSY CERVIX  2003    negative    COLONOSCOPY  2002    benign polyp; Dr Gutiérrez Senior IVF  1997    successful    HX COLONOSCOPY  5/2005    normal, f/u 10 yrs, Dr Antonio Balderas HX COLONOSCOPY  03/09/2018    polypectomy x 2, repeat 5 yrs, Dr. Coreas Captain  2009    negative    HX GI  2001    Nissen Fundoplication for severe reflux    HX HEMORRHOIDECTOMY  3/2002    HX MALIGNANT SKIN LESION EXCISION  1994    BCC excised right chest    XR CHEST PA LAT  10/2008    normal         MEDICATIONS      Current Outpatient Prescriptions   Medication Sig    ALPRAZolam (XANAX) 0.25 mg tablet TAKE 1-2 TABLETS BY MOUTH EVERY NIGHT AS NEEDED FOR ANXIETY    cholecalciferol, vitamin D3, (VITAMIN D3) 2,000 unit tab Take  by mouth. Takes 1-2 x a week    furosemide (LASIX) 20 mg tablet Take 0.5-1 Tabs by mouth daily as needed. Take w/ small glass of OJ or 1/2 banana or other potassium rich food/beverage  Indications: Edema     No current facility-administered medications for this visit.            ALLERGIES     Allergies   Allergen Reactions    Latex Other (comments)    Adhesive Rash    Demerol [Meperidine] Other (comments)     GI    Morphine Other (comments)     Chest pain    Other Medication Other (comments)     Nickel          SOCIAL HISTORY       Social History     Social History    Marital status:      Spouse name: N/A    Number of children: 1    Years of education: N/A     Occupational History    Dental Hygenist, part time, 2 x a week      Social History Main Topics    Smoking status: Never Smoker    Smokeless tobacco: Never Used    Alcohol use No      Comment: none since ; usually very occ small glass of wine on special occasions now the past year, prior to that she had 1-2 glasses in the evenings a few x a week    Drug use: No    Sexual activity: Yes     Partners: Male     Other Topics Concern    Caffeine Concern No     1 small cup of coffee a day    Stress Concern Yes     best friend dying of cancer    Weight Concern No     has regained some of the wt she lost since she has been stress eating the past few months    Special Diet Yes     doing some stress eating the past 2 months bc her best friend is dying of cancer, more snacking at night but trying to eat sensibly during the day    Exercise Yes     walks 3 miles a day     Social History Narrative        IMMUNIZATIONS  Immunization History   Administered Date(s) Administered    Influenza Nasal Vaccine 2014    Tdap 2015         FAMILY HISTORY     Family History   Problem Relation Age of Onset    Breast Cancer Mother 46    Cancer Mother       of lung cancer, 59; smoker    Cancer Father       of lung cancer, 76; smoker    Diabetes Father     Heart Disease Brother       IHSS age 32   [employer] Heart Attack Brother     Hypertension Brother     Hypertension Brother     High Cholesterol Brother     Breast Cancer Maternal Grandmother 48    Heart Disease Maternal Grandmother 61    Diabetes Paternal Grandmother     Heart Disease Paternal Grandmother       CHF 76    Stroke Paternal Grandfather 79    Breast Cancer Maternal Aunt 36    Diabetes Paternal Aunt     Diabetes Paternal Uncle     Other Son      Asperger's Syndrome         VITALS     Visit Vitals    /76 (BP 1 Location: Left arm, BP Patient Position: Sitting)    Pulse 62    Temp 98.1 °F (36.7 °C) (Oral)  Resp 18    Ht 5' 5.5\" (1.664 m)    Wt 172 lb (78 kg)    LMP 07/19/2011    SpO2 98%    BMI 28.19 kg/m2          PHYSICAL EXAMINATION     Physical Exam   Constitutional: She is oriented to person, place, and time and well-developed, well-nourished, and in no distress. HENT:   Nose: Nose normal.   Mouth/Throat: Oropharynx is clear and moist. No oropharyngeal exudate. Eyes: Conjunctivae and EOM are normal. Pupils are equal, round, and reactive to light. Neck: Neck supple. No JVD present. Carotid bruit is not present. No thyromegaly present. Cardiovascular: Normal rate, regular rhythm and normal heart sounds. No murmur heard. Pulmonary/Chest: Effort normal and breath sounds normal. No respiratory distress. Abdominal: Soft. Bowel sounds are normal. She exhibits no distension and no mass. There is no tenderness. Musculoskeletal: Normal range of motion. She exhibits no edema or tenderness. Lymphadenopathy:     She has no cervical adenopathy. Neurological: She is alert and oriented to person, place, and time. She has normal reflexes. No cranial nerve deficit.  Gait normal. Coordination normal.   Psychiatric: Mood, affect and judgment normal.   Vitals reviewed.          DIAGNOSTIC DATA         LABORATORY DATA       Lab Results  Component Value Date/Time   WBC 3.6 05/04/2018 08:20 AM   HGB 13.7 05/04/2018 08:20 AM   HCT 41.0 05/04/2018 08:20 AM   PLATELET 149 96/39/7258 08:20 AM   MCV 97 05/04/2018 08:20 AM     Lab Results  Component Value Date/Time   Cholesterol, total 227 (H) 05/04/2018 08:20 AM   HDL Cholesterol 66 05/04/2018 08:20 AM   LDL, calculated 148 (H) 05/04/2018 08:20 AM   Triglyceride 65 05/04/2018 08:20 AM     Lab Results  Component Value Date/Time   TSH 1.290 05/04/2018 08:20 AM      Lab Results   Component Value Date/Time    Sodium 139 05/04/2018 08:20 AM    Potassium 5.0 05/04/2018 08:20 AM    Chloride 100 05/04/2018 08:20 AM    CO2 27 05/04/2018 08:20 AM    Glucose 85 05/04/2018 08:20 AM    BUN 17 05/04/2018 08:20 AM    Creatinine 0.83 05/04/2018 08:20 AM    BUN/Creatinine ratio 20 05/04/2018 08:20 AM    GFR est AA 91 05/04/2018 08:20 AM    GFR est non-AA 79 05/04/2018 08:20 AM    Calcium 9.6 05/04/2018 08:20 AM    Bilirubin, total 0.5 05/04/2018 08:20 AM    ALT (SGPT) 25 05/04/2018 08:20 AM    AST (SGOT) 23 05/04/2018 08:20 AM    Alk. phosphatase 62 05/04/2018 08:20 AM    Protein, total 6.8 05/04/2018 08:20 AM    Albumin 4.5 05/04/2018 08:20 AM    A-G Ratio 2.0 05/04/2018 08:20 AM      Vitamin D 28 (improved from 24)      ASSESSMENT & PLAN       ICD-10-CM ICD-9-CM    1. Routine general medical examination at a health care facility Z00.00 V70.0    2. Hypercholesterolemia E78.00 272.0      Fasting labs from 5-4-18 r/w pt today (copied above)  Cardiovascular risk and specific lipid/LDL goals reviewed  Reviewed diet, nutrition, exercise and weight control  Reviewed medications and side effects  HM & prevention counseling, age/risk based screening recommendations r/w pt  Sees gyn annually for well woman visits and annual mammogram screens  Colonoscopy up to date per her GI's recommendations  RTC 6 months for follow up controlled medication check.  CSA updated a few months ago

## 2018-05-11 NOTE — PATIENT INSTRUCTIONS
High Cholesterol: Care Instructions  Your Care Instructions    Cholesterol is a type of fat in your blood. It is needed for many body functions, such as making new cells. Cholesterol is made by your body. It also comes from food you eat. High cholesterol means that you have too much of the fat in your blood. This raises your risk of a heart attack and stroke. LDL and HDL are part of your total cholesterol. LDL is the \"bad\" cholesterol. High LDL can raise your risk for heart disease, heart attack, and stroke. HDL is the \"good\" cholesterol. It helps clear bad cholesterol from the body. High HDL is linked with a lower risk of heart disease, heart attack, and stroke. Your cholesterol levels help your doctor find out your risk for having a heart attack or stroke. You and your doctor can talk about whether you need to lower your risk and what treatment is best for you. A heart-healthy lifestyle along with medicines can help lower your cholesterol and your risk. The way you choose to lower your risk will depend on how high your risk is for heart attack and stroke. It will also depend on how you feel about taking medicines. Follow-up care is a key part of your treatment and safety. Be sure to make and go to all appointments, and call your doctor if you are having problems. It's also a good idea to know your test results and keep a list of the medicines you take. How can you care for yourself at home? · Eat a variety of foods every day. Good choices include fruits, vegetables, whole grains (like oatmeal), dried beans and peas, nuts and seeds, soy products (like tofu), and fat-free or low-fat dairy products. · Replace butter, margarine, and hydrogenated or partially hydrogenated oils with olive and canola oils. (Canola oil margarine without trans fat is fine.)  · Replace red meat with fish, poultry, and soy protein (like tofu). · Limit processed and packaged foods like chips, crackers, and cookies.   · Bake, broil, or steam foods. Don't oswald them. · Be physically active. Get at least 30 minutes of exercise on most days of the week. Walking is a good choice. You also may want to do other activities, such as running, swimming, cycling, or playing tennis or team sports. · Stay at a healthy weight or lose weight by making the changes in eating and physical activity listed above. Losing just a small amount of weight, even 5 to 10 pounds, can reduce your risk for having a heart attack or stroke. · Do not smoke. When should you call for help? Watch closely for changes in your health, and be sure to contact your doctor if:  ? · You need help making lifestyle changes. ? · You have questions about your medicine. Where can you learn more? Go to http://glenny-macho.info/. Enter A737 in the search box to learn more about \"High Cholesterol: Care Instructions. \"  Current as of: September 21, 2016  Content Version: 11.4  © 5919-1995 [employer]. Care instructions adapted under license by [employer] (which disclaims liability or warranty for this information). If you have questions about a medical condition or this instruction, always ask your healthcare professional. [place] [address] any warranty or liability for your use of this information.

## 2018-06-18 DIAGNOSIS — F41.9 INSOMNIA SECONDARY TO ANXIETY: ICD-10-CM

## 2018-06-18 DIAGNOSIS — F51.05 INSOMNIA SECONDARY TO ANXIETY: ICD-10-CM

## 2018-06-18 RX ORDER — ALPRAZOLAM 0.25 MG/1
TABLET ORAL
Qty: 60 TAB | Refills: 2 | OUTPATIENT
Start: 2018-06-18 | End: 2018-11-09 | Stop reason: SDUPTHER

## 2018-06-19 NOTE — TELEPHONE ENCOUNTER
Last seen 5-11-18  CSA updated 3-2018  Please pull  and if ok, may phone in as ordered  Already scheduled for next f/u:  Future Appointments:  11/9/2018  8:15 AM    Prisca Mac MD  \[facility] [place] [facility]\""P

## 2018-07-24 ENCOUNTER — TELEPHONE (OUTPATIENT)
Dept: FAMILY MEDICINE CLINIC | Age: 58
End: 2018-07-24

## 2018-07-24 NOTE — TELEPHONE ENCOUNTER
Patient was just inquiring about our policy on prescribing xanax - didn't understand why her next appointment isn't until November but only received two refills on the xanax  Explained it is because xanax being a controlled med and that the pharmacy will just contact us when she is due for another refill.

## 2018-07-24 NOTE — TELEPHONE ENCOUNTER
Patient is calling in regards to medication ALPRAZolam (XANAX) 0.25 mg tablet  she states she is not sure why there are no refills left on medication. Patient was seen in office on Friday, May 11, 2018 09:00 AM and she is scheduled  to be seen in office on Friday, November 9, 2018 08:15 AM for her 6mth f/u for med eval , and is wanting to know if she is needing to be seen in often more often in order to get refills on current medication. She is requesting a call back in regards to this matter.      Best call [phone]

## 2018-09-06 DIAGNOSIS — Z12.39 BREAST SCREENING: Primary | ICD-10-CM

## 2018-10-04 ENCOUNTER — HOSPITAL ENCOUNTER (OUTPATIENT)
Dept: MAMMOGRAPHY | Age: 58
Discharge: HOME | End: 2018-10-04
Attending: SURGERY
Payer: COMMERCIAL

## 2018-10-04 DIAGNOSIS — Z12.39 SCREENING BREAST EXAMINATION: ICD-10-CM

## 2018-10-04 PROCEDURE — 77063 BREAST TOMOSYNTHESIS BI: CPT

## 2018-10-16 ENCOUNTER — TELEPHONE (OUTPATIENT)
Dept: FAMILY MEDICINE CLINIC | Age: 58
End: 2018-10-16

## 2018-10-16 NOTE — TELEPHONE ENCOUNTER
----- Message from Marky Mendenhall sent at 10/15/2018  5:15 PM EDT -----  Regarding: Dr. Amos Necessary  Pt requesting a returned call regarding headaches, she is schedule for an appt 11/9/18 for a med check but has some concerns and would like the doctor to call at [phone].

## 2018-10-16 NOTE — TELEPHONE ENCOUNTER
CRISTOFER for return call. PC back to say that she had called yesterday to schedule an appt b/c of a h/a. She reports new glasses a couple of weeks ago (progressive lens). She also has a hx of clinching her jaw. 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 Pt feels that both of these 2 issues have caused the h/a. She will f/u prn. Nothing needed at this time per pt.

## 2018-11-08 ENCOUNTER — OFFICE VISIT (OUTPATIENT)
Dept: SLEEP MEDICINE | Age: 58
End: 2018-11-08

## 2018-11-08 VITALS
HEART RATE: 75 BPM | BODY MASS INDEX: 26.61 KG/M2 | DIASTOLIC BLOOD PRESSURE: 75 MMHG | OXYGEN SATURATION: 97 % | WEIGHT: 165.6 LBS | SYSTOLIC BLOOD PRESSURE: 114 MMHG | HEIGHT: 66 IN

## 2018-11-08 DIAGNOSIS — G47.33 OSA (OBSTRUCTIVE SLEEP APNEA): Primary | ICD-10-CM

## 2018-11-08 DIAGNOSIS — Z86.59 H/O: DEPRESSION: ICD-10-CM

## 2018-11-08 NOTE — PROGRESS NOTES
217 [facility]. [place], [address]  Tel.  [phone]  Fax. 100 [facility] 60  [place], [address]  Tel.  [phone]  Fax. [phone] 17 [facility], [place]   Tel.  [phone]  Fax. [phone]         Subjective:      Parviz Will is an 62 y.o. female referred for evaluation for a sleep disorder. She complains of snoring associated with snorting, awakening in the middle of the night some times because of headaches. She was previously treated for depression and later switched to [facility] and later Xanax O. Symptoms began about 8 months previously, gradually worsening since that time. She usually can fall asleep in 5 minutes. Family or house members note snoring. She denies completely or partially paralyzed while falling asleep or waking up. Parviz Will does not wake up frequently at night. She is bothered by waking up too early and left unable to get back to sleep. She actually sleeps about 6 hours at night and wakes up about 6 times during the night. She does work shifts:  First Shift. [facility] indicates she does get too little sleep at night. Her bedtime is 2200. She awakens at 0600. She does not take naps. She takes   naps a week lasting  . She has the following observed behaviors: Loud snoring, Light snoring, Clenching / Grinding teeth - uses a mouth gaurd; Nightmares. Other remarks: Vivid dreams and waking with a gasp or snort    [place] Sleepiness Score: 5     Allergies   Allergen Reactions    Latex Other (comments)    Adhesive Rash    Demerol [Meperidine] Other (comments)     GI    Morphine Other (comments)     Chest pain    Other Medication Other (comments)     Nickel         Current Outpatient Medications:     ALPRAZolam (XANAX) 0.25 mg tablet, TAKE 1 TO 2 TABLETS BY MOUTH AT NIGHT AS NEEDED FOR ANXIETY, Disp: 60 Tab, Rfl: 2    furosemide (LASIX) 20 mg tablet, Take 0.5-1 Tabs by mouth daily as needed. Take w/ small glass of OJ or 1/2 banana or other potassium rich food/beverage  Indications: Edema, Disp: 30 Tab, Rfl: 0    cholecalciferol, vitamin D3, (VITAMIN D3) 2,000 unit tab, Take  by mouth. 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 She  has a past surgical history that includes hx hemorrhoidectomy (3/2002); xr chest pa lat (10/2008); colonoscopy (2002); hx gi (2001); hx malignant skin lesion excision (1994); biopsy cervix (2003); female ivf (1997); hx dilation and curettage (2009); hx colonoscopy (5/2005); and hx colonoscopy (03/09/2018). She family history includes Breast Cancer (age of onset: 36) in her maternal aunt; Breast Cancer (age of onset: 48) in her maternal grandmother; Breast Cancer (age of onset: 46) in her mother; Cancer in her father and mother; Diabetes in her father, paternal aunt, paternal grandmother, and paternal uncle; Heart Attack in her brother; Heart Disease in her brother and paternal grandmother; Heart Disease (age of onset: 61) in her maternal grandmother; High Cholesterol in her brother; Hypertension in her brother and brother; Other in her son; Stroke (age of onset: 79) in her paternal grandfather. She  reports that  has never smoked. she has never used smokeless tobacco. She reports that she does not drink alcohol or use drugs.      Review of Systems:  Constitutional:  No significant weight loss or weight gain  Eyes:  No blurred vision  CVS:  No significant chest pain  Pulm:  No significant shortness of breath  GI:  No significant nausea or vomiting  :  No significant nocturia  Musculoskeletal:  No significant joint pain at night  Skin:  No significant rashes  Neuro:  No significant dizziness   Psych:  No active mood issues    Sleep Review of Systems: notable for no difficulty falling asleep; frequent awakenings at night;  regular dreaming noted;  nightmares ;  early morning headaches; no memory problems; no concentration issues; no history of any automobile or occupational accidents due to daytime drowsiness. Objective:     Visit Vitals  /75   Pulse 75   Ht 5' 5.5\" (1.664 m)   Wt 165 lb 9.6 oz (75.1 kg)   LMP 07/19/2011   SpO2 97%   BMI 27.14 kg/m²         General:   Not in acute distress   Eyes:  Anicteric sclerae, no obvious strabismus   Nose:  No obvious nasal septum deviation    Oropharynx:   Class 4 oropharyngeal outlet, thick tongue base, uvula could not be seen due to low-lying soft palate, narrow tonsilo-pharyngeal pilars   Tonsils:   tonsils are not seen due to low-lying soft palate   Neck:   Neck circ. in \"inches\": 12; midline trachea   Chest/Lungs:  Equal lung expansion, clear on auscultation    CVS:  Normal rate, regular rhythm; no JVD   Skin:  Warm to touch; no obvious rashes   Neuro:  No focal deficits ; no obvious tremor    Psych:  Normal affect,  normal countenance;          Assessment:       ICD-10-CM ICD-9-CM    1. RUEL (obstructive sleep apnea) G47.33 327.23 SLEEP STUDY UNATTENDED, 4 CHANNEL   2. BMI 27.0-27.9,adult Z68.27 V85.23    3. H/O: depression Z86.59 V11.8          Plan:     * The patient currently has a Low Risk for having sleep apnea. STOP-BANG score 3.  * Sleep testing was ordered for initial evaluation. PSG / MSLT if HSAT is negative. * She was provided information on sleep apnea including coresponding risk factors and the importance of proper treatment. * Treatment options if indicated were reviewed today. Patient agrees to a trial of PAP therapy if indicated. * Counseling was provided regarding proper sleep hygiene (including effect of light on sleep), paradoxical intention, stimulus control, sleep environment safety and safe driving. * Patient agrees to telephone [phone]  follow-up by myself or lead sleep technologist shortly after sleep study to review results and plan final management.     (patient has given permission for a message to be left regarding test results and further management if patient cannot be cannot be reached directly). Thank you for allowing us to participate in your patient's medical care. We'll keep you updated on these investigations. Yamel Butler MD, FAASM  Electronically signed.  11/08/18

## 2018-11-09 ENCOUNTER — TELEPHONE (OUTPATIENT)
Dept: SLEEP MEDICINE | Age: 58
End: 2018-11-09

## 2018-11-09 ENCOUNTER — OFFICE VISIT (OUTPATIENT)
Dept: FAMILY MEDICINE CLINIC | Age: 58
End: 2018-11-09

## 2018-11-09 VITALS
OXYGEN SATURATION: 98 % | HEART RATE: 74 BPM | DIASTOLIC BLOOD PRESSURE: 70 MMHG | RESPIRATION RATE: 18 BRPM | WEIGHT: 166 LBS | HEIGHT: 66 IN | SYSTOLIC BLOOD PRESSURE: 108 MMHG | BODY MASS INDEX: 26.68 KG/M2 | TEMPERATURE: 97.8 F

## 2018-11-09 DIAGNOSIS — F51.04 CHRONIC INSOMNIA: Primary | ICD-10-CM

## 2018-11-09 DIAGNOSIS — F51.05 INSOMNIA SECONDARY TO ANXIETY: ICD-10-CM

## 2018-11-09 DIAGNOSIS — Z51.81 ENCOUNTER FOR MEDICATION MONITORING: ICD-10-CM

## 2018-11-09 DIAGNOSIS — F41.9 INSOMNIA SECONDARY TO ANXIETY: ICD-10-CM

## 2018-11-09 RX ORDER — ALPRAZOLAM 0.25 MG/1
TABLET ORAL
Qty: 60 TAB | Refills: 2 | Status: SHIPPED | OUTPATIENT
Start: 2018-11-09 | End: 2020-05-18 | Stop reason: SDUPTHER

## 2018-11-09 NOTE — PROGRESS NOTES
Chief Complaint   Patient presents with    Medication Evaluation     controlled med check      1. Have you been to the ER, urgent care clinic since your last visit? Hospitalized since your last visit? No    2. Have you seen or consulted any other health care providers outside of the [address] [place] [address] since your last visit? Include any pap smears or colon screening.  No

## 2018-11-09 NOTE — PATIENT INSTRUCTIONS

## 2018-11-09 NOTE — PROGRESS NOTES
HISTORY OF PRESENT ILLNESS   HPI  6 month follow up medication check on Xanax nightly for anxiety related insomnia. She was prescribed Xanax 0.25 mg originally per her psychiatrist who has now retired. This was used in lieu of her insurance no longer covering the [place] which worked great for her. The Xanax has worked very effectively at helping her stay asleep soundly through the night. Her issue is primarily sleep maintenance. She has always tended to fall asleep pretty well on her own, but without medication, she has several awakenings through the night, severe restlessness, racing thoughts, and anxiety all night which renders her exhausted the following day and impairs her ability to function well in all aspects (home, work, interpersonal). She typically takes her Xanax nightly anywhere from 1-1 1/2 tabs every night but at times takes 2 tabs (1-2 times a week on work nights). The past several months, her  has reported to her that she snores loud, snorts and grunts. She has had a lot of dry mouth and throat irritation from all the mouth breathing she has been doing. She has used a mouth guard on and off over the years for TMJ/clenching/grinding. She has been feeling more tired during the day. She had initial consultation w/ sleep specialist yesterday and she is in the process of being approved for a home sleep study. REVIEW OF SYMPTOMS     Review of Systems   Constitutional: Negative. Cardiovascular: Negative. Gastrointestinal: Negative. Neurological: Negative.     Psychiatric/Behavioral: Negative for depression.           PROBLEM LIST/MEDICAL HISTORY      Problem List  Date Reviewed: 11/9/2018          Codes Class Noted    Dependent Edema of BLE ICD-10-CM: R60.9  ICD-9-CM: 782.3  11/3/2017        Controlled substance agreement signed ICD-10-CM: Z79.899  ICD-9-CM: V58.69  5/4/2017    Overview Addendum 3/13/2018  3:14 PM by Dominick Stevenson MD     [id number], 3-1253 Hypercholesterolemia ICD-10-CM: E78.00  ICD-9-CM: 272.0  5/4/2017        Screening exams for skin cancer ICD-10-CM: Z12.83  ICD-9-CM: V76.43  5/4/2017    Overview Signed 5/4/2017  1:07 PM by Boris Fuentes MD     Per Derm Dr Anayeli Rg             Low back pain ICD-10-CM: M54.5  ICD-9-CM: 724.2  2/1/2017    Overview Signed 2/1/2017  8:33 AM by Boris Fuentes MD     Xrays [id number], normal, did PT             Recurrent cystitis ICD-10-CM: N30.90  ICD-9-CM: 595.9  4/28/2016    Overview Signed 3/13/2018  3:29 PM by Boris Fuentes MD     [place] [id number]             Chronic insomnia ICD-10-CM: F51.04  ICD-9-CM: 780.52  4/16/2015    Overview Addendum 4/28/2016 11:45 AM by Boris Fuentes MD     Psych rx'd Bibistacyomariroque Barton worked really well, but insurance stopped covering             Postmenopause, LMP 48, No HRT ICD-10-CM: Z78.0  ICD-9-CM: V49.81  4/16/2015        Menopausal symptoms ICD-10-CM: N95.1  ICD-9-CM: 627.2  4/3/2014    Overview Signed 4/3/2014 12:01 PM by Boris Fuentes MD     Gyn Dr Joya De La Garza, pt declined HRT             Vitamin D deficiency ICD-10-CM: E55.9  ICD-9-CM: 268.9  7/25/2011    Overview Signed 7/25/2011 10:54 PM by Boris Fuentes MD     7/2011             Anxiety ICD-10-CM: F41.9  ICD-9-CM: 300.00  7/20/2011        H/O Post partum depression ICD-10-CM: F53.0  ICD-9-CM: 648.44, 311  7/20/2011    Overview Signed 7/20/2011  9:14 AM by Boris Fuentes MD     1997             Psoriasis ICD-10-CM: L40.9  ICD-9-CM: 696.1  7/20/2011    Overview Signed 7/20/2011  9:18 AM by Boris Fuentes MD     Derm Dr Lyn Kidney             Allergic reaction to nickel ICD-10-CM: L23.0  ICD-9-CM: 692.83  7/20/2011        Panic disorder ICD-10-CM: F41.0  ICD-9-CM: 300.01  5/19/2010    Overview Addendum 2/1/2017  8:33 AM by Boris Fuentes MD     Psych Dr Kota Weinstein (retired 2016)             Depression ICD-10-CM: F32.9  ICD-9-CM: 518  5/19/2010    Overview Addendum 2/1/2017  8:33 AM by Bebeto Horn MD     [id number]; Psych Dr David Corona (retired 2016)             Colon polyp ICD-10-CM: K63.5  ICD-9-CM: 211.3  5/19/2010    Overview Signed 5/19/2010 10:06 AM by Ed Myers     Benign              Family history of breast cancer ICD-10-CM: Z80.3  ICD-9-CM: V16.3  5/19/2010        Family history of diabetes mellitus (DM) ICD-10-CM: Z83.3  ICD-9-CM: V18.0  5/19/2010        Migraine ICD-10-CM: F21.637  ICD-9-CM: 346.90  5/19/2010    Overview Addendum 4/3/2014 11:40 AM by Bebeto Horn MD     4/04; Acute migraine w/ neuro changes summer 2013, [facility] ER, CT scan negative             Screening for cardiovascular condition ICD-10-CM: Z13.6  ICD-9-CM: V81.2  5/19/2010    Overview Signed 5/19/2010 10:09 AM by Ed Myers     For IHSS (negative)                       PAST SURGICAL HISTORY       Past Surgical History:   Procedure Laterality Date    BIOPSY CERVIX  2003    negative    COLONOSCOPY  2002    benign polyp; Dr Suha Levine    successful    HX COLONOSCOPY  5/2005    normal, f/u 10 yrs, Dr Roddy Huitron HX COLONOSCOPY  03/09/2018    polypectomy x 2, repeat 5 yrs, Dr. Faizan Cross  2009    negative    HX GI  2001    Nissen Fundoplication for severe reflux    HX HEMORRHOIDECTOMY  3/2002    HX MALIGNANT SKIN LESION EXCISION  1994    BCC excised right chest    XR CHEST PA LAT  10/2008    normal         MEDICATIONS      Current Outpatient Medications   Medication Sig    ALPRAZolam (XANAX) 0.25 mg tablet TAKE 1 TO 2 TABLETS BY MOUTH AT NIGHT AS NEEDED FOR ANXIETY    cholecalciferol, vitamin D3, (VITAMIN D3) 2,000 unit tab Take  by mouth. Takes 1-2 x a week    furosemide (LASIX) 20 mg tablet Take 0.5-1 Tabs by mouth daily as needed. Take w/ small glass of OJ or 1/2 banana or other potassium rich food/beverage  Indications: Edema     No current facility-administered medications for this visit.            ALLERGIES Allergies   Allergen Reactions    Latex Other (comments)    Adhesive Rash    Demerol [Meperidine] Other (comments)     GI    Morphine Other (comments)     Chest pain    Other Medication Other (comments)     Nickel          SOCIAL HISTORY       Social History     Socioeconomic History    Marital status:      Spouse name: Not on file    Number of children: 1    Years of education: Not on file    Highest education level: Not on file   Social Needs    Financial resource strain: Not on file    Food insecurity - worry: Not on file    Food insecurity - inability: Not on file   [place] needs - medical: Not on file   [place] needs - non-medical: Not on file   Occupational History    Occupation: Dental Hygenist, part time, 2 x a week   Tobacco Use    Smoking status: Never Smoker    Smokeless tobacco: Never Used   Substance and Sexual Activity    Alcohol use: No     Comment: none since 2-2016; usually very occ small glass of wine on special occasions now the past year, prior to that she had 1-2 glasses in the evenings a few x a week    Drug use: No    Sexual activity: Yes     Partners: Male   Other Topics Concern     Service Not Asked    Blood Transfusions Not Asked    Caffeine Concern No     Comment: 1 small cup of coffee a day    Occupational Exposure Not Asked    Hobby Hazards Not Asked    Sleep Concern Not Asked    Stress Concern Yes     Comment: best friend dying of cancer    Weight Concern No     Comment: has regained some of the wt she lost since she has been stress eating the past few months    Special Diet Not Asked    Back Care Not Asked    Exercise Yes     Comment: walks 3 miles a day    Bike Helmet Not Asked    Seat Belt Not Asked    Self-Exams Not Asked   Social History Narrative    Not on file        IMMUNIZATIONS  Immunization History   Administered Date(s) Administered    Influenza Nasal Vaccine 11/02/2014    Influenza Vaccine 11/02/2018  Tdap 2015         FAMILY HISTORY     Family History   Problem Relation Age of Onset    Breast Cancer Mother 46    Cancer Mother          of lung cancer, 59; smoker    Cancer Father          of lung cancer, 76; smoker    Diabetes Father     Heart Disease Brother          IHSS age 30    Heart Attack Brother     Hypertension Brother     Hypertension Brother     High Cholesterol Brother     Breast Cancer Maternal Grandmother 48    Heart Disease Maternal Grandmother 61    Diabetes Paternal Grandmother     Heart Disease Paternal Grandmother          CHF 76    Stroke Paternal Grandfather 79    Breast Cancer Maternal Aunt 36    Diabetes Paternal Aunt     Diabetes Paternal Uncle     Other Son         Asperger's Syndrome         VITALS     Visit Vitals  /70 (BP 1 Location: Left arm, BP Patient Position: Sitting)   Pulse 74   Temp 97.8 °F (36.6 °C) (Oral)   Resp 18   Ht 5' 5.5\" (1.664 m)   Wt 166 lb (75.3 kg)   LMP 2011   SpO2 98%   BMI 27.20 kg/m²          PHYSICAL EXAMINATION     Physical Exam   Constitutional: She is oriented to person, place, and time and well-developed, well-nourished, and in no distress. Cardiovascular: Normal rate, regular rhythm and normal heart sounds. Pulmonary/Chest: Effort normal and breath sounds normal.   Neurological: She is alert and oriented to person, place, and time. Psychiatric: Mood and affect normal.   Vitals reviewed.              ASSESSMENT & PLAN       ICD-10-CM ICD-9-CM    1. Chronic insomnia F51.04 780.52    2. 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 Encounter for medication monitoring Z51.81 V58.83 Julius Campo 13 (MW)     She had initial consultation w/ sleep specialist Dr. Master Oh yesterday and she is in the process of being approved for a home sleep study. Continue Xanax for now as directed.  She was prescribed Xanax 0.25 mg originally per her psychiatrist who has now retired. This was used in lieu of her insurance no longer covering the [place] which worked great for her. The Xanax has worked very effectively at helping her stay asleep soundly through the night.  pulled and reviewed. No problems noted. Last filled 8-18-18. Low abuse/misuse potential. Patient counseled and we discussed controlled medications, effects, side effects, indications, risks vs benefits, precautions, potential interactions/dangers w/ other medications, illicit drugs, abuse potential and the possible negative health implications/risks associated w/ overuse/abuse/misuse of controlled stimulant or sedating medications including overdose and death. Patient expressed understanding and agreement with current plan of care. CSA updated 3-2018. UDS collected today.   RTC  6months for fasting CPE and next med check, f/u sooner prn

## 2018-11-15 ENCOUNTER — OFFICE VISIT (OUTPATIENT)
Dept: SLEEP MEDICINE | Age: 58
End: 2018-11-15

## 2018-11-15 ENCOUNTER — HOSPITAL ENCOUNTER (OUTPATIENT)
Dept: SLEEP MEDICINE | Age: 58
Discharge: HOME | End: 2018-11-15
Payer: COMMERCIAL

## 2018-11-15 DIAGNOSIS — G47.33 OSA (OBSTRUCTIVE SLEEP APNEA): Primary | ICD-10-CM

## 2018-11-15 LAB — DRUGS UR: NORMAL

## 2018-11-15 PROCEDURE — 95806 SLEEP STUDY UNATT&RESP EFFT: CPT | Performed by: INTERNAL MEDICINE

## 2018-11-19 ENCOUNTER — TELEPHONE (OUTPATIENT)
Dept: SLEEP MEDICINE | Age: 58
End: 2018-11-19

## 2018-11-19 DIAGNOSIS — G47.419 NARCOLEPSY WITHOUT CATAPLEXY: Primary | ICD-10-CM

## 2018-11-20 NOTE — TELEPHONE ENCOUNTER
Results of sleep testing reviewed with patient who reports of continued poor quality sleep and of waking up exhausted. She stated she slept better with increase in dose of medication. She did agree to attended testing at night but declined daytime testing due to absence of need to nap.

## 2018-11-20 NOTE — TELEPHONE ENCOUNTER
Marcelo Carreon is to be contacted by lead sleep technologist regarding results of Sleep Testing which was indicative of an average AHI of 1.9 per hour with an SpO2 fercho of 87% and SpO2 of < 88% being 0 minutes. Attended night and day testing is recommended to assess for narcolepsy due to report of poor quality nocturnal sleep, vivid dreaming and nightmares at initial visit. Encounter Diagnosis   Name Primary?     Narcolepsy without cataplexy Yes       Orders Placed This Encounter    POLYSOMNOGRAPHY 1 NIGHT     Order Specific Question:   Reason for Exam     Answer:   Narcolepsy

## 2018-11-27 ENCOUNTER — TELEPHONE (OUTPATIENT)
Dept: SLEEP MEDICINE | Age: 58
End: 2018-11-27

## 2018-11-27 NOTE — TELEPHONE ENCOUNTER
The patient is wanting to know if her PCP can give her Marcine Milks due to lack of sleep? Xanax is no longer working, and she doesn't know if she needs to gradually stop the medication.

## 2018-11-28 ENCOUNTER — TELEPHONE (OUTPATIENT)
Dept: FAMILY MEDICINE CLINIC | Age: 58
End: 2018-11-28

## 2018-11-28 NOTE — TELEPHONE ENCOUNTER
Called pt and she states that she had the sleep study. She reports that MD told her she didn't have apnea. She is calling b/c she would like to get a rx for to help her sleep. She is currently taking xanax . 25 mg qhs. Confirmed pharmacy. She would like a generic medication. Pt said that they had discussed it at last OV. She said that she had tried [place] in the past and remembers that she didn't like it very much. But she can't remember why. She just wants something generic and she is open to anything. She does have melatonin but she hasn't tried that.

## 2018-11-28 NOTE — TELEPHONE ENCOUNTER
Pt is requesting a call back from nurse in regards to her sleep. Pt states she will wait for return call to go into detail .     Best call back [phone]

## 2018-11-29 NOTE — TELEPHONE ENCOUNTER
I looked at sleep doctors note. They mentioned that pt reported sleeping better w/ increase in dosage of medication but what medication are they referring to? The other note stated that patient wanted PCP to start her on Lunesta and patient's note states she wants to take something generic (which Dakshaian Christoph is not). She also told me the Xanax was helping her but the sleep doctor's note states she told them its not helping her. Based on my last office note, this is what was conveyed to me by pt, so I am getting confusing mixed messages about exactly what she wants:  \"She was prescribed Xanax 0.25 mg originally per her psychiatrist who has now retired. This was used in lieu of her insurance no longer covering the Ciarra [place]th[place] which worked great for her. The Xanax has worked very effectively at helping her stay asleep soundly through the night. \"

## 2018-11-29 NOTE — TELEPHONE ENCOUNTER
I called pt to get clarification. She said that the xanax does help (not every night). It does relax her but she doesn't feel like she gets a good sleep. She still feels exhausted. \"the xanax use to work really good but not so mush anymore\"  Pt doesn't want to increase the dose. Pt said she doesn't know what she needs to do and that is why she is calling us. She needs guidance on what to do. She did say that she took some advil one night and she felt better rested. I asked if she had taken the advil PM.  She said that she is open to that but she is afraid of not taking the xanax b/c she has been on it so long. She doesn't want to go thru withdrawals. She just wants help. She went to the sleep MD b/c she is looking for help. He told her that she doesn't have sleep apnea.

## 2018-12-02 NOTE — TELEPHONE ENCOUNTER
Yes, tell pt it is best for her to come in and see me again to discuss in more detail and come up w/ a good game plan for her

## 2018-12-04 ENCOUNTER — TELEPHONE (OUTPATIENT)
Dept: FAMILY MEDICINE CLINIC | Age: 58
End: 2018-12-04

## 2018-12-04 NOTE — TELEPHONE ENCOUNTER
Patient is calling to JUST speak to Dr Elias Ponce, she wants appointment tomorrow, she says its her to decised what time she want to see her.  Requesting call back   Best call back : [phone]

## 2018-12-04 NOTE — TELEPHONE ENCOUNTER
----- Message from Annalise Perez sent at 12/4/2018  9:29 AM EST -----  Regarding: Dr. Mona Narvaez  Pt returning a call from the nurse in regards to an appt for tomorrow. Best contact [phone]. Leave a voicemail please. Called patient LVM to call back tomorrow as early as 7 am to check Dr Burnett Meter same day appts, or to see another provider.

## 2018-12-04 NOTE — TELEPHONE ENCOUNTER
----- Message from Gracy Vaz sent at 12/3/2018  5:26 PM EST -----  Regarding: Dr Romana Seltzer (c) please call cell phone only (c) [phone], Patient was returning the nurses call.  Regarding getting in to see Dr Master Contreras

## 2018-12-06 ENCOUNTER — TELEPHONE (OUTPATIENT)
Dept: FAMILY MEDICINE CLINIC | Age: 58
End: 2018-12-06

## 2018-12-06 ENCOUNTER — OFFICE VISIT (OUTPATIENT)
Dept: FAMILY MEDICINE CLINIC | Age: 58
End: 2018-12-06

## 2018-12-06 VITALS
OXYGEN SATURATION: 98 % | HEART RATE: 72 BPM | DIASTOLIC BLOOD PRESSURE: 64 MMHG | HEIGHT: 66 IN | SYSTOLIC BLOOD PRESSURE: 100 MMHG | RESPIRATION RATE: 18 BRPM | WEIGHT: 161 LBS | BODY MASS INDEX: 25.88 KG/M2 | TEMPERATURE: 97.9 F

## 2018-12-06 DIAGNOSIS — F41.8 INSOMNIA SECONDARY TO DEPRESSION WITH ANXIETY: ICD-10-CM

## 2018-12-06 DIAGNOSIS — F41.8 DEPRESSION WITH ANXIETY: Primary | ICD-10-CM

## 2018-12-06 DIAGNOSIS — F51.05 INSOMNIA SECONDARY TO DEPRESSION WITH ANXIETY: ICD-10-CM

## 2018-12-06 RX ORDER — ESZOPICLONE 1 MG/1
1 TABLET, FILM COATED ORAL
Qty: 30 TAB | Refills: 1 | Status: SHIPPED | OUTPATIENT
Start: 2018-12-06 | End: 2019-01-02

## 2018-12-06 RX ORDER — ESCITALOPRAM OXALATE 10 MG/1
10 TABLET ORAL DAILY
Qty: 30 TAB | Refills: 1 | Status: SHIPPED | OUTPATIENT
Start: 2018-12-06 | End: 2019-01-02

## 2018-12-06 NOTE — PROGRESS NOTES
HISTORY OF PRESENT ILLNESS   HPI  Patient presents for follow up discussion about her sleep issues and admits that for several months she has been feeling depressed. Patient has h/o recurrent depression and anxiety x several years. She had been seen by her psychiatrist up until she retired back in 2016. Patient recalls having been tried on Zoloft and Paxil in the past w/o success. She states that eventually Dr. Rosario Garcia had her on a different SSRI which worked well for her but soon after was taken off the market. It was then decided that since her depression was pretty stable over time, that they would just try to target her sleep issues feeling that if she could establish better sleep patterns that she would do better cognitively. She tried Ambien but that caused nightmares and vivid dreams. She took low dose Lunesta which worked really well for her but eventually her insurance stopped covering it. She has Melatonin at home but was leery of trying it because of being on the Xanax nightly. Chart indicates that at one time she was on Trazodone but pt cannot recall its efficacy/tolerability. She would be open to trying it again if it helps. Her depression started resurfacing this summer. Her best friend  of cancer last month and now she only has one surviving college roommate which has really been making her feel sad and depressed. She has also been very stressed and overwhelmed at work and feels like she is not working or functioning very effectively. Sometimes feels like she is just an \"emotional wreck\" and \"all over the place\". She doesn't feel like she is useful at work anymore and after the first of the year she plans on cutting back considerably. At times when she gets overwhelmed whether at work or other stressful situations, she feels shaky, racy, heart palpitations, light headed, and nauseated. She has to take deep breaths to calm/relax herself.  She has been feeling queasy and sick to her stomach when she gets anxious/nervous. She has had some loose bowels since yesterday. She states that \"her mental affects her physically\". Since the increased depression and anxiety, her sleep patterns are even worse than before. She typically takes Xanax 0.25 mg 1 tab nightly but some nights (~2-3 nights a week) she takes 1 1/2 if she knows she has to work the next day. The Xanax continues to help her fall asleep effectively but some nights she still awakens frequently and cant shut her mind down. She then feels exhausted the next day. She has been feeling more sluggish, down, unmotivated, not much drive or stamina. Not doing as much. Wants to get back to doing things she enjoys but is having a hard time reaching that place mentally. Finds herself crying/tearful and sighing most days. She denies suicidal thoughts or ideations. But because she recalls this is how her depression started a few years ago, she reached out to her former psychiatrist's office and has an appt set w/ the doctor who has taken over for Dr. Nancy oPwer. She could not get an appt there until Feb. She has seen a counselor in the past but not for a few years. She wants to wait to see the psychiatrist before establishing w/ another counselor for now. REVIEW OF SYMPTOMS     Review of Systems   Cardiovascular: Negative for chest pain. Psychiatric/Behavioral: Positive for depression. Negative for suicidal ideas.  The patient is nervous/anxious and has insomnia.            PROBLEM LIST/MEDICAL HISTORY      Problem List  Date Reviewed: 12/6/2018          Codes Class Noted    Dependent Edema of BLE ICD-10-CM: R60.9  ICD-9-CM: 782.3  11/3/2017        Controlled substance agreement signed ICD-10-CM: Z79.899  ICD-9-CM: V58.69  5/4/2017    Overview Addendum 3/13/2018  3:14 PM by Ryanne Ventura MD     [id number], 3-2018             Hypercholesterolemia ICD-10-CM: E78.00  ICD-9-CM: 272.0  5/4/2017        Screening exams for skin cancer ICD-10-CM: Z12.83  ICD-9-CM: V76.43  5/4/2017    Overview Signed 5/4/2017  1:07 PM by Doreen Campo MD     Per Derm Dr Melissa Orta             Low back pain ICD-10-CM: M54.5  ICD-9-CM: 724.2  2/1/2017    Overview Signed 2/1/2017  8:33 AM by Doreen Campo MD     Xrays [id number], normal, did PT             Recurrent cystitis ICD-10-CM: N30.90  ICD-9-CM: 595.9  4/28/2016    Overview Signed 3/13/2018  3:29 PM by Doreen Campo MD      2014             Chronic insomnia ICD-10-CM: F51.04  ICD-9-CM: 780.52  4/16/2015    Overview Addendum 4/28/2016 11:45 AM by Doreen Campo MD     Psych rx'd Dali Barrier worked really well, but insurance stopped covering             Postmenopause, LMP 48, No HRT ICD-10-CM: Z78.0  ICD-9-CM: V49.81  4/16/2015        Menopausal symptoms ICD-10-CM: N95.1  ICD-9-CM: 627.2  4/3/2014    Overview Signed 4/3/2014 12:01 PM by Doreen Campo MD     Gyn Dr Lorraine Sparks, pt declined HRT             Vitamin D deficiency ICD-10-CM: E55.9  ICD-9-CM: 268.9  7/25/2011    Overview Signed 7/25/2011 10:54 PM by Doreen Campo MD     7/2011             Anxiety ICD-10-CM: F41.9  ICD-9-CM: 300.00  7/20/2011        H/O Post partum depression ICD-10-CM: F53.0  ICD-9-CM: 648.44, 311  7/20/2011    Overview Signed 7/20/2011  9:14 AM by Doreen Campo MD     1997             Psoriasis ICD-10-CM: L40.9  ICD-9-CM: 696.1  7/20/2011    Overview Signed 7/20/2011  9:18 AM by Doreen Campo MD     Derm Dr Maxx Rachel             Allergic reaction to nickel ICD-10-CM: L23.0  ICD-9-CM: 692.83  7/20/2011        Panic disorder ICD-10-CM: F41.0  ICD-9-CM: 300.01  5/19/2010    Overview Addendum 2/1/2017  8:33 AM by Doreen Campo MD     Psych Dr Isrrael Castañeda (retired 2016)             Depression ICD-10-CM: F32.9  ICD-9-CM: 788  5/19/2010    Overview Addendum 2/1/2017  8:33 AM by Doreen Campo MD     1997; Psych Dr Lala Favre (retired 2016)             Colon polyp ICD-10-CM: K63.5  ICD-9-CM: 211.3  5/19/2010    Overview Signed 5/19/2010 10:06 AM by Cha Guerra     Benign              Family history of breast cancer ICD-10-CM: Z80.3  ICD-9-CM: V16.3  5/19/2010        Family history of diabetes mellitus (DM) ICD-10-CM: Z83.3  ICD-9-CM: V18.0  5/19/2010        Migraine ICD-10-CM: X37.252  ICD-9-CM: 346.90  5/19/2010    Overview Addendum 4/3/2014 11:40 AM by Andrew Campo MD     4/04; Acute migraine w/ neuro changes summer 2013, [address], CT scan negative             Screening for cardiovascular condition ICD-10-CM: Z13.6  ICD-9-CM: V81.2  5/19/2010    Overview Signed 5/19/2010 10:09 AM by Cha Guerra     For IHSS (negative)                       PAST SURGICAL HISTORY       Past Surgical History:   Procedure Laterality Date    BIOPSY CERVIX  2003    negative    COLONOSCOPY  2002    benign polyp; Dr Apollo Bruner    successful    HX COLONOSCOPY  5/2005    normal, f/u 10 yrs, Dr Seth Azul HX COLONOSCOPY  03/09/2018    polypectomy x 2, repeat 5 yrs, Dr. Kaylee Villareal  2009    negative    HX GI  2001    Nissen Fundoplication for severe reflux    HX HEMORRHOIDECTOMY  3/2002    HX MALIGNANT SKIN LESION EXCISION  1994    BCC excised right chest    XR CHEST PA LAT  10/2008    normal         MEDICATIONS      Current Outpatient Medications   Medication Sig    ALPRAZolam (XANAX) 0.25 mg tablet TAKE 1 TO 2 TABLETS BY MOUTH AT NIGHT AS NEEDED FOR ANXIETY (Patient taking differently: Take 0.25 mg by mouth nightly as needed. TAKE 1 TO 2 TABLETS BY MOUTH AT NIGHT AS NEEDED FOR ANXIETY)    furosemide (LASIX) 20 mg tablet Take 0.5-1 Tabs by mouth daily as needed. Take w/ small glass of OJ or 1/2 banana or other potassium rich food/beverage  Indications: Edema    cholecalciferol, vitamin D3, (VITAMIN D3) 2,000 unit tab Take  by mouth. Takes 1-2 x a week     No current facility-administered medications for this visit. ALLERGIES     Allergies   Allergen Reactions    Latex Other (comments)    Adhesive Rash    Ambien [Zolpidem] Other (comments)     Didn't like, vivid dreams    Demerol [Meperidine] Other (comments)     GI    Morphine Other (comments)     Chest pain    Other Medication Other (comments)     Nickel          SOCIAL HISTORY       Social History     Socioeconomic History    Marital status:      Spouse name: Not on file    Number of children: 1    Years of education: Not on file    Highest education level: Not on file   Social Needs    Financial resource strain: Not on file    Food insecurity - worry: Not on file    Food insecurity - inability: Not on file   [place] needs - medical: Not on file   [place] needs - non-medical: Not on file   Occupational History    Occupation: Dental Hygenist, part time, 2 x a week   Tobacco Use    Smoking status: Never Smoker    Smokeless tobacco: Never Used   Substance and Sexual Activity    Alcohol use: No     Comment: none since 2-2016; usually very occ small glass of wine on special occasions now the past year, prior to that she had 1-2 glasses in the evenings a few x a week    Drug use: No    Sexual activity: Yes     Partners: Male   Other Topics Concern     Service Not Asked    Blood Transfusions Not Asked    Caffeine Concern No     Comment: 1 small cup of coffee a day    Occupational Exposure Not Asked    Hobby Hazards Not Asked    Sleep Concern Not Asked    Stress Concern Yes     Comment: best friend dying of cancer    Weight Concern No     Comment: has regained some of the wt she lost since she has been stress eating the past few months    Special Diet Not Asked    Back Care Not Asked    Exercise Yes     Comment: walks 3 miles a day    Bike Helmet Not Asked    Seat Belt Not Asked    Self-Exams Not Asked   Social History Narrative    Not on file        IMMUNIZATIONS  Immunization History   Administered Date(s) Administered    Influenza Nasal Vaccine 2014    Influenza Vaccine 2018    Tdap 2015         FAMILY HISTORY     Family History   Problem Relation Age of Onset    Breast Cancer Mother 46    Cancer Mother          of lung cancer, 59; smoker    Cancer Father          of lung cancer, 76; smoker    Diabetes Father     Heart Disease Brother          IHSS age 30    Heart Attack Brother     Hypertension Brother     Hypertension Brother     High Cholesterol Brother     Breast Cancer Maternal Grandmother 48    Heart Disease Maternal Grandmother 61    Diabetes Paternal Grandmother     Heart Disease Paternal Grandmother          CHF 76    Stroke Paternal Grandfather 79    Breast Cancer Maternal Aunt 36    Diabetes Paternal Aunt     Diabetes Paternal Uncle     Other Son         Asperger's Syndrome         VITALS     Visit Vitals  /64 (BP 1 Location: Left arm, BP Patient Position: Sitting)   Pulse 72   Temp 97.9 °F (36.6 °C) (Oral)   Resp 18   Ht 5' 5.5\" (1.664 m)   Wt 161 lb (73 kg)   LMP 2011   SpO2 98%   BMI 26.38 kg/m²          PHYSICAL EXAMINATION     Physical Exam   Cardiovascular: Normal rate, regular rhythm and normal heart sounds. Pulmonary/Chest: Effort normal and breath sounds normal.   Psychiatric:   Depressed, teary for most of today's visit, scattered thoughts, flight of ideas, often interrupts me when talking. Sometimes stops herself and has to recollect her thoughts because she cant keep up w/ what she is trying to say. Says \"I know I am a wreck and all over the place right now, I just need to get myself together\". Denies SI/HI.     Vitals reviewed.          DIAGNOSTIC DATA         LABORATORY DATA       Lab Results   Component Value Date/Time    WBC 3.6 2018 08:20 AM    HGB 13.7 2018 08:20 AM    HCT 41.0 2018 08:20 AM    PLATELET 009  08:20 AM    MCV 97 2018 08:20 AM     Lab Results   Component Value Date/Time Cholesterol, total 227 (H) 05/04/2018 08:20 AM    HDL Cholesterol 66 05/04/2018 08:20 AM    LDL, calculated 148 (H) 05/04/2018 08:20 AM    Triglyceride 65 05/04/2018 08:20 AM     Lab Results   Component Value Date/Time    TSH 1.290 05/04/2018 08:20 AM      Lab Results   Component Value Date/Time    Sodium 139 05/04/2018 08:20 AM    Potassium 5.0 05/04/2018 08:20 AM    Chloride 100 05/04/2018 08:20 AM    CO2 27 05/04/2018 08:20 AM    Glucose 85 05/04/2018 08:20 AM    BUN 17 05/04/2018 08:20 AM    Creatinine 0.83 05/04/2018 08:20 AM    BUN/Creatinine ratio 20 05/04/2018 08:20 AM    GFR est AA 91 05/04/2018 08:20 AM    GFR est non-AA 79 05/04/2018 08:20 AM    Calcium 9.6 05/04/2018 08:20 AM    Bilirubin, total 0.5 05/04/2018 08:20 AM    ALT (SGPT) 25 05/04/2018 08:20 AM    AST (SGOT) 23 05/04/2018 08:20 AM    Alk. phosphatase 62 05/04/2018 08:20 AM    Protein, total 6.8 05/04/2018 08:20 AM    Albumin 4.5 05/04/2018 08:20 AM    A-G Ratio 2.0 05/04/2018 08:20 AM          ASSESSMENT & PLAN       ICD-10-CM ICD-9-CM    1. Depression with anxiety F41.8 300.4 escitalopram oxalate (LEXAPRO) 10 mg tablet   2. Insomnia secondary to depression with anxiety F51.05 300.4 eszopiclone (LUNESTA) 1 mg tablet    F41.8 327.02      She is scheduled to see new psychiatrist who took over for her previous psychiatrist Dr. Benjamin Rosales, but that appt is not until Feb 2-2019. She declines counseling for now. Sleep hygiene counseling discussion again today. Start trial of Lexapro 10 mg every day  She has taken Lunesta 1 mg in the past which worked really effectively for her, but her insurance stopped covering it back then. She would like to try now to see if they are covering it, so I have given her a printed rx for the Lunesta 1 mg qhs as directed  If not covered, we discussed trying the Trazodone again.   She currently takes Xanax 0.25 mg nightly and we discussed that once she starts on new sleep aide rx that she will take a 1/2 of the Xanax for the first few nights then dc it after the first week. Reviewed medications, effects and side effects in detail   pulled and reviewed. No problems noted. Xanax 0.25 mg last filled 11-9-18. Patient counseled and we discussed controlled medications, effects, side effects, indications, risks vs benefits, precautions, potential interactions/dangers w/ other medications, illicit drugs, abuse potential and the possible negative health implications/risks associated w/ overuse/abuse/misuse of controlled stimulant or sedating medications including overdose and death. Patient expressed understanding and agreement with current plan of care.    Follow up w/ me in 3-4 weeks, sooner prn (patient scheduled for 1-2-19)

## 2018-12-06 NOTE — TELEPHONE ENCOUNTER
called to report that insurance covered both medications (lexapro and lunesta). She wanted to confirm that she would start both today. Advised yes she can.

## 2018-12-06 NOTE — PATIENT INSTRUCTIONS

## 2018-12-06 NOTE — TELEPHONE ENCOUNTER
----- Message from Primus Persons sent at 12/6/2018 12:22 PM EST -----  Regarding: Dr. Nadine Heredia: [phone]  Pt is returning a call from the nurse .  Best contact [phone]

## 2018-12-06 NOTE — PROGRESS NOTES
Chief Complaint   Patient presents with    Insomnia     would like to discuss issue -               1. Have you been to the ER, urgent care clinic since your last visit? Hospitalized since your last visit? No    2. Have you seen or consulted any other health care providers outside of the [facility] since your last visit? Include any pap smears or colon screening.  No

## 2018-12-13 ENCOUNTER — TELEPHONE (OUTPATIENT)
Dept: FAMILY MEDICINE CLINIC | Age: 58
End: 2018-12-13

## 2018-12-13 NOTE — TELEPHONE ENCOUNTER
Called pt. She states that she hasn't started the lexapro yet. She wanted to see how the generic lunesta did for her. She reports that the lunesta will put her to sleep. Sometimes it works all night and then other times she doesn't sleep thru the night. Last night she took the lunesta at 10. she woke up around 2-2:30. She does say that her mind was racing and couldn't get back to sleep. She woke up with a H/A and having to go to the bathroom. She has looked over the potential SE of the lexapro and one of them if insomnia. She said that she doesn't need that. She wanted to know if it would be better for her to just take trazodone instead. She would like something that helps her sleep. She has quit her job which should help. She wants Dr Flores Zuñiga' opinion. Advised that I would get back with her. PI of Dr Flores Zuñiga rec. Pt states understanding. She will give it a try. She is scheduled with a psychiatrist in Jan and will see a counselor in the same group.

## 2018-12-13 NOTE — TELEPHONE ENCOUNTER
Pt is calling, requesting call back from office in regards to medication. Pt did not want to provide name of medication until call was return from office.      Best call back number  [phone]

## 2018-12-13 NOTE — TELEPHONE ENCOUNTER
I would not prescribe something that I think is going to cause insomnia. That is not a common side effect of the Lexapro and I still think she should try it. If she wants to wait and work on the sleep part first then add the Lexapro later, we can. Since she has the [facility] and is on the lowest dose, I would have her increase to 2 tablets nightly for a few nights. Please reinforce to patient that anything we try at this time is going to take time for her. She has been on Xanax. Any time you change from one sleep aide to another, there is going to be a \"wash out period\" where nothing is going to work right away. She really really needs to see a counselor. I know she is scheduled to see psychiatry in February but she needs a counselor as well and should start seeing them as soon as possible. Her sleep issue is largely related to psychological stress and anxiety and until she gets control of that, no sleep medication is going to work fully effectively.

## 2019-01-02 ENCOUNTER — OFFICE VISIT (OUTPATIENT)
Dept: FAMILY MEDICINE CLINIC | Age: 59
End: 2019-01-02

## 2019-01-02 VITALS
BODY MASS INDEX: 26.03 KG/M2 | HEIGHT: 66 IN | TEMPERATURE: 97.4 F | SYSTOLIC BLOOD PRESSURE: 108 MMHG | HEART RATE: 74 BPM | OXYGEN SATURATION: 98 % | RESPIRATION RATE: 18 BRPM | WEIGHT: 162 LBS | DIASTOLIC BLOOD PRESSURE: 74 MMHG

## 2019-01-02 DIAGNOSIS — F41.1 GENERALIZED ANXIETY DISORDER WITH PANIC ATTACKS: Primary | ICD-10-CM

## 2019-01-02 DIAGNOSIS — F41.9 HYPOSOMNIA, INSOMNIA OR SLEEPLESSNESS ASSOCIATED WITH ANXIETY: ICD-10-CM

## 2019-01-02 DIAGNOSIS — F43.21 ADJUSTMENT REACTION WITH BRIEF DEPRESSIVE REACTION: ICD-10-CM

## 2019-01-02 DIAGNOSIS — F41.0 GENERALIZED ANXIETY DISORDER WITH PANIC ATTACKS: Primary | ICD-10-CM

## 2019-01-02 DIAGNOSIS — F51.05 HYPOSOMNIA, INSOMNIA OR SLEEPLESSNESS ASSOCIATED WITH ANXIETY: ICD-10-CM

## 2019-01-02 DIAGNOSIS — Z79.899 CONTROLLED SUBSTANCE AGREEMENT SIGNED: ICD-10-CM

## 2019-01-02 DIAGNOSIS — Z51.81 ENCOUNTER FOR MEDICATION MONITORING: ICD-10-CM

## 2019-01-02 NOTE — PROGRESS NOTES
HISTORY OF PRESENT ILLNESS   HPI  4 week follow up depression, anxiety and medication check. At her last visit w/ me 12-6-18, I prescribed a trial of Lexapro 10 mg daily and restarted Lunesta 1 mg that she had taken in the past. She stopped the Xanax that she had been taking nightly and initially started w/ the Lunesta 1 mg (she was on that before w/ good results), but called back a week later to report that it was helping her fall asleep but was not consistently keeping her asleep through the night. Some nights she would sleep all through the night but other nights she would have frequent awakenings. At that point we advised her to try increasing the Lunesta to 2 tabs nightly. She tried that for 2 nights in a row, it again helped her fall asleep but she still kept waking up in the night and the next morning she would feel \"high\". So she stopped it altogether and just went back to taking the Xanax 1/2 tablet nightly. That is working well at this point. She also stopped working 12-12-18 which helped tremendously because that is a huge stress relief for her, so that naturally has helped w/ her sleep because she is not feeling overwhelmed, anxious and her mind is not racing anymore. She is also eating better and exercising again. Since all this, she is not having body aches. She is sleeping much better and she is not feeling depressed. She feels like she was going through a \"situational crisis\" previously w/ so much going on at work and her close friend dying. She was very overwhelmed and states she was looking to change things out of desperation. But now that she has let her job go, she is coping and managing things much better. Feels like she is in a better place now. She is looking forward to getting back to doing things she enjoys like crocheting and painting. Her  is fully supportive and that has helped tremendously as well.  When she had so much going on, she was up to taking the Xanax 1-1/2 tablets nightly but now that things have settled she has primarily just been taking the 1/2 tablet, on rare occ a full tablet. She would have panic attacks every morning before going into work and since resigning, she has not had anymore. She never ended up starting the Lexapro because she was leery of it after reading about the side effects. So she opted not to take it. Since she is feeling so much better now, she wants to wait to take any antidepressants. She feels she is in a much better place. She is scheduled to see the new psychiatrist who took over for her previous psychiatrist Dr. Linda Magallanes on 1-29-19. She will keep that appt and is also scheduled for sleep study on 2-6-19. REVIEW OF SYMPTOMS     Review of Systems   Respiratory: Negative. Cardiovascular: Negative. Gastrointestinal: Negative.     Psychiatric/Behavioral: Negative for depression.           PROBLEM LIST/MEDICAL HISTORY      Problem List  Date Reviewed: 1/2/2019          Codes Class Noted    Dependent Edema of BLE ICD-10-CM: R60.9  ICD-9-CM: 782.3  11/3/2017        Controlled substance agreement signed ICD-10-CM: Z79.899  ICD-9-CM: V58.69  5/4/2017    Overview Addendum 3/13/2018  3:14 PM by Matt Miranda MD     2-1599, 3-2018             Hypercholesterolemia ICD-10-CM: E78.00  ICD-9-CM: 272.0  5/4/2017        Screening exams for skin cancer ICD-10-CM: Z12.83  ICD-9-CM: V76.43  5/4/2017    Overview Signed 5/4/2017  1:07 PM by Matt Miranda MD     Per Derm Dr Alyssia Latham             Low back pain ICD-10-CM: M54.5  ICD-9-CM: 724.2  2/1/2017    Overview Signed 2/1/2017  8:33 AM by Matt Miranda MD     Xrays 5-0788, normal, did PT             Recurrent cystitis ICD-10-CM: N30.90  ICD-9-CM: 595.9  4/28/2016    Overview Signed 3/13/2018  3:29 PM by Matt Miranda MD      2014             Chronic insomnia ICD-10-CM: F51.04  ICD-9-CM: 780.52  4/16/2015    Overview Addendum 4/28/2016 11:45 AM by Matt Miranda MD NOEMÍ     Psych rx'd [facility] worked really well, but insurance stopped covering             Postmenopause, LMP 48, No HRT ICD-10-CM: Z78.0  ICD-9-CM: V49.81  4/16/2015        Menopausal symptoms ICD-10-CM: N95.1  ICD-9-CM: 627.2  4/3/2014    Overview Signed 4/3/2014 12:01 PM by Abdon Ponce MD     Gyn Dr William Vargas, pt declined HRT             Vitamin D deficiency ICD-10-CM: E55.9  ICD-9-CM: 268.9  7/25/2011    Overview Signed 7/25/2011 10:54 PM by Abdon Ponce MD     7/2011             Anxiety ICD-10-CM: F41.9  ICD-9-CM: 300.00  7/20/2011        H/O Post partum depression ICD-10-CM: F53.0  ICD-9-CM: 648.44, 311  7/20/2011    Overview Signed 7/20/2011  9:14 AM by Abdon Ponce MD     1997             Psoriasis ICD-10-CM: L40.9  ICD-9-CM: 696.1  7/20/2011    Overview Signed 7/20/2011  9:18 AM by Abdon Ponce MD     Derm Dr Mick Cornelius             Allergic reaction to nickel ICD-10-CM: L23.0  ICD-9-CM: 692.83  7/20/2011        Panic disorder ICD-10-CM: F41.0  ICD-9-CM: 300.01  5/19/2010    Overview Addendum 2/1/2017  8:33 AM by Abdon Ponce MD     Psych Dr Antoinette Hood (retired 2016)             Depression ICD-10-CM: F32.9  ICD-9-CM: 803  5/19/2010    Overview Addendum 2/1/2017  8:33 AM by Abdon Ponce MD     1997; Psych Dr Donna Abrams (retired 2016)             Colon polyp ICD-10-CM: L75.8  ICD-9-CM: 211.3  5/19/2010    Overview Signed 5/19/2010 10:06 AM by Mariah Clement     Benign              Family history of breast cancer ICD-10-CM: Z80.3  ICD-9-CM: V16.3  5/19/2010        Family history of diabetes mellitus (DM) ICD-10-CM: Z83.3  ICD-9-CM: V18.0  5/19/2010        Migraine ICD-10-CM: P52.418  ICD-9-CM: 346.90  5/19/2010    Overview Addendum 4/3/2014 11:40 AM by Abdon Ponce MD     4/04;  Acute migraine w/ neuro changes summer 2013, [facility] ER, CT scan negative             Screening for cardiovascular condition ICD-10-CM: Z13.6  ICD-9-CM: V81.2 5/19/2010    Overview Signed 5/19/2010 10:09 AM by Jeffrey Partida     For IHSS (negative)                       PAST SURGICAL HISTORY       Past Surgical History:   Procedure Laterality Date    BIOPSY CERVIX  2003    negative    COLONOSCOPY  2002    benign polyp; Dr Cindy Boyd IVF  1997    successful    HX COLONOSCOPY  5/2005    normal, f/u 10 yrs, Dr Anne Meade HX COLONOSCOPY  03/09/2018    polypectomy x 2, repeat 5 yrs, Dr. Kurt Estrada  2009    negative    HX GI  2001    Nissen Fundoplication for severe reflux    HX HEMORRHOIDECTOMY  3/2002    HX MALIGNANT SKIN LESION EXCISION  1994    BCC excised right chest    XR CHEST PA LAT  10/2008    normal         MEDICATIONS      Current Outpatient Medications   Medication Sig    ALPRAZolam (XANAX) 0.25 mg tablet TAKES 1/2 TABLET NIGHTLY    furosemide (LASIX) 20 mg tablet Take 0.5-1 Tabs by mouth daily as needed. Take w/ small glass of OJ or 1/2 banana or other potassium rich food/beverage  Indications: Edema    cholecalciferol, vitamin D3, (VITAMIN D3) 2,000 unit tab Take  by mouth.  Takes 1-2 x a week               ALLERGIES     Allergies   Allergen Reactions    Latex Other (comments)    Adhesive Rash    Ambien [Zolpidem] Other (comments)     Didn't like, vivid dreams    Demerol [Meperidine] Other (comments)     GI    Morphine Other (comments)     Chest pain    Other Medication Other (comments)     Nickel          SOCIAL HISTORY       Social History     Socioeconomic History    Marital status:      Spouse name: Not on file    Number of children: 1    Years of education: Not on file    Highest education level: Not on file   Social Needs    Financial resource strain: Not on file    Food insecurity - worry: Not on file    Food insecurity - inability: Not on file   [employer] needs - medical: Not on file   [employer] needs - non-medical: Not on file   Occupational History    Occupation: Dental Hygenist, part time, 2 x a week   Tobacco Use    Smoking status: Never Smoker    Smokeless tobacco: Never Used   Substance and Sexual Activity    Alcohol use: No     Comment: none since ; usually very occ small glass of wine on special occasions now the past year, prior to that she had 1-2 glasses in the evenings a few x a week    Drug use: No    Sexual activity: Yes     Partners: Male   Other Topics Concern     Service Not Asked    Blood Transfusions Not Asked    Caffeine Concern No     Comment: 1 small cup of coffee a day    Occupational Exposure Not Asked    Hobby Hazards Not Asked    Sleep Concern Not Asked    Stress Concern Yes     Comment: best friend dying of cancer    Weight Concern No     Comment: has regained some of the wt she lost since she has been stress eating the past few months    Special Diet Not Asked    Back Care Not Asked    Exercise Yes     Comment: walks 3 miles a day    Bike Helmet Not Asked    Seat Belt Not Asked    Self-Exams Not Asked   Social History Narrative    Not on file        IMMUNIZATIONS  Immunization History   Administered Date(s) Administered    Influenza Nasal Vaccine 2014    Influenza Vaccine 2018    Tdap 2015         FAMILY HISTORY     Family History   Problem Relation Age of Onset    Breast Cancer Mother 46    Cancer Mother          of lung cancer, 59; smoker    Cancer Father          of lung cancer, 76; smoker    Diabetes Father     Heart Disease Brother          IHSS age 32    Heart Attack Brother     Hypertension Brother     Hypertension Brother     High Cholesterol Brother     Breast Cancer Maternal Grandmother 48    Heart Disease Maternal Grandmother 61    Diabetes Paternal Grandmother     Heart Disease Paternal Grandmother          CHF 76    Stroke Paternal Grandfather 79    Breast Cancer Maternal Aunt 36    Diabetes Paternal Aunt     Diabetes Paternal Uncle     Other Son Asperger's Syndrome         VITALS     Visit Vitals  /74 (BP 1 Location: Left arm, BP Patient Position: Sitting)   Pulse 74   Temp 97.4 °F (36.3 °C) (Oral)   Resp 18   Ht 5' 5.5\" (1.664 m)   Wt 162 lb (73.5 kg)   LMP 07/19/2011   SpO2 98%   BMI 26.55 kg/m²          PHYSICAL EXAMINATION     Physical Exam   Constitutional: She is well-developed, well-nourished, and in no distress. Cardiovascular: Normal rate and regular rhythm. Pulmonary/Chest: Effort normal.   Psychiatric: Mood and affect normal.   Vitals reviewed.              ASSESSMENT & PLAN       ICD-10-CM ICD-9-CM    1. Generalized anxiety disorder with panic attacks F41.1 300.02     F41.0 300.01    2. Hyposomnia, insomnia or sleeplessness associated with anxiety F51.05 293.84     F41.9 327.02    3. Adjustment reaction with brief depressive reaction, improved F43.21 309.0    4. Encounter for medication monitoring Z51.81 V58.83    5. Controlled substance agreement signed Z79.899 V58.69      Patient overall getting along better now that she finally resigned from her job 12-12-18. She feels a huge relief and is naturally feeling much better. She is not feeling sad or depressed and is coping much better overall. She never ended up starting the Lexapro because she was leery of it after reading about the side effects. So she opted not to take it. Since she is feeling so much better now, she wants to wait to take any antidepressants. She feels she is in a much better place. She is scheduled to see the new psychiatrist who took over for her previous psychiatrist Dr. Mcneil Courser on 1-29-19. She will keep that appt and is also scheduled for sleep study on 2-6-19. She is currently down to taking Xanax 0.25 mg 1/2 tablet nightly which is working effectively for her so she will stay on that low dose regimen at bedtime for now until she has evaluation w/ new psychiatrist.     pulled and reviewed. No problems noted.    Xanax last filled 12-13-18 #60, refills not needed at this time. Low abuse/misuse potential. Patient counseled and we discussed controlled medications, effects, side effects, indications, risks vs benefits, precautions, potential interactions/dangers w/ other medications, illicit drugs, abuse potential and the possible negative health implications/risks associated w/ overuse/abuse/misuse of controlled stimulant or sedating medications including overdose and death. Patient expressed understanding and agreement with current plan of care. Controlled Substance Agreement updated today. Copy signed and filed to scan. UDS up to date 11-9-18  She is already scheduled for follow up and annual CPE w/ me in May.  RTC sooner prn

## 2019-01-02 NOTE — PROGRESS NOTES
Chief Complaint   Patient presents with    Anxiety     4 week follow up-taking just the xanax 0.25    Depression     1. Have you been to the ER, urgent care clinic since your last visit? Hospitalized since your last visit? No    2. Have you seen or consulted any other health care providers outside of the [address] [place] [address] since your last visit? Include any pap smears or colon screening.  No

## 2019-01-02 NOTE — PATIENT INSTRUCTIONS

## 2019-02-04 ENCOUNTER — TELEPHONE (OUTPATIENT)
Dept: SLEEP MEDICINE | Age: 59
End: 2019-02-04

## 2019-02-04 NOTE — TELEPHONE ENCOUNTER
P2P scheduled with [facility] on tomorrow from 11:30 - 3:30. Intake I.D. [id number].

## 2019-02-05 ENCOUNTER — TELEPHONE (OUTPATIENT)
Dept: SLEEP MEDICINE | Age: 59
End: 2019-02-05

## 2019-02-05 NOTE — TELEPHONE ENCOUNTER
Submitted office notes and HST results to [facility] and requested an expedited decision.

## 2019-02-05 NOTE — TELEPHONE ENCOUNTER
P2P reivew done, reviewing MD not aware of home sleep testing which wa inconclusive in this patient with history of snoring, gasping and frequent nocturnal awakening. We will resubmit with report of home sleep test ASAP as case close in 15 days.

## 2019-02-06 ENCOUNTER — TELEPHONE (OUTPATIENT)
Dept: SLEEP MEDICINE | Age: 59
End: 2019-02-06

## 2019-02-06 NOTE — TELEPHONE ENCOUNTER
The patient wants to know if she can benefit from an oral appliance due to the fact that she is chipping her teeth at night.

## 2019-02-06 NOTE — TELEPHONE ENCOUNTER
Spoke with the patient to notify the patient of the denial decision of the sleep study from [facility].

## 2019-02-06 NOTE — TELEPHONE ENCOUNTER
After reviewing the notes and sleep study the decision to deny the study has been upheld. The patient will be notified.

## 2019-02-06 NOTE — TELEPHONE ENCOUNTER
The denial decision has been upheld. Patient notified, and is inquiring about a dental referral for an oral appliance. She states that she is chipping her teeth in her sleep.

## 2019-02-08 ENCOUNTER — TELEPHONE (OUTPATIENT)
Dept: SLEEP MEDICINE | Age: 59
End: 2019-02-08

## 2019-02-08 NOTE — TELEPHONE ENCOUNTER
LVM requesting a return call to re-schedule a PSG. [facility] has approved the study.

## 2019-04-15 ENCOUNTER — TELEPHONE (OUTPATIENT)
Dept: FAMILY MEDICINE CLINIC | Age: 59
End: 2019-04-15

## 2019-04-15 DIAGNOSIS — Z00.00 ROUTINE GENERAL MEDICAL EXAMINATION AT A HEALTH CARE FACILITY: Primary | ICD-10-CM

## 2019-04-15 DIAGNOSIS — E78.00 HYPERCHOLESTEROLEMIA: ICD-10-CM

## 2019-04-15 DIAGNOSIS — E55.9 VITAMIN D DEFICIENCY: ICD-10-CM

## 2019-04-15 NOTE — TELEPHONE ENCOUNTER
Pt has a CPE for 05/17/19  And would like to have the blood work ordered prior to her appt so she can get it done.

## 2019-05-10 ENCOUNTER — HOSPITAL ENCOUNTER (OUTPATIENT)
Dept: CT IMAGING | Age: 59
Discharge: HOME | End: 2019-05-10
Attending: FAMILY MEDICINE
Payer: COMMERCIAL

## 2019-05-10 ENCOUNTER — OFFICE VISIT (OUTPATIENT)
Dept: FAMILY MEDICINE CLINIC | Age: 59
End: 2019-05-10

## 2019-05-10 ENCOUNTER — TELEPHONE (OUTPATIENT)
Dept: FAMILY MEDICINE CLINIC | Age: 59
End: 2019-05-10

## 2019-05-10 ENCOUNTER — HOSPITAL ENCOUNTER (OUTPATIENT)
Dept: VASCULAR SURGERY | Age: 59
Discharge: HOME | End: 2019-05-10
Attending: FAMILY MEDICINE
Payer: COMMERCIAL

## 2019-05-10 VITALS
SYSTOLIC BLOOD PRESSURE: 131 MMHG | HEART RATE: 99 BPM | RESPIRATION RATE: 18 BRPM | BODY MASS INDEX: 25.71 KG/M2 | OXYGEN SATURATION: 97 % | WEIGHT: 160 LBS | TEMPERATURE: 98.1 F | HEIGHT: 66 IN | DIASTOLIC BLOOD PRESSURE: 85 MMHG

## 2019-05-10 DIAGNOSIS — H81.90 VESTIBULAR DIZZINESS: ICD-10-CM

## 2019-05-10 DIAGNOSIS — R26.89 BALANCE DISORDER: ICD-10-CM

## 2019-05-10 DIAGNOSIS — E78.00 HYPERCHOLESTEROLEMIA: ICD-10-CM

## 2019-05-10 DIAGNOSIS — R51.9 INTRACTABLE HEADACHE, UNSPECIFIED CHRONICITY PATTERN, UNSPECIFIED HEADACHE TYPE: ICD-10-CM

## 2019-05-10 DIAGNOSIS — R11.0 NAUSEA: ICD-10-CM

## 2019-05-10 DIAGNOSIS — H81.90 VESTIBULAR DIZZINESS: Primary | ICD-10-CM

## 2019-05-10 DIAGNOSIS — E66.3 OVERWEIGHT: ICD-10-CM

## 2019-05-10 DIAGNOSIS — Z00.00 ROUTINE GENERAL MEDICAL EXAMINATION AT A HEALTH CARE FACILITY: ICD-10-CM

## 2019-05-10 DIAGNOSIS — E55.9 VITAMIN D DEFICIENCY: ICD-10-CM

## 2019-05-10 PROCEDURE — 70450 CT HEAD/BRAIN W/O DYE: CPT

## 2019-05-10 PROCEDURE — 93880 EXTRACRANIAL BILAT STUDY: CPT

## 2019-05-10 RX ORDER — PROMETHAZINE HYDROCHLORIDE 25 MG/ML
25 INJECTION, SOLUTION INTRAMUSCULAR; INTRAVENOUS ONCE
Qty: 1 VIAL | Refills: 0
Start: 2019-05-10 | End: 2019-05-10

## 2019-05-10 RX ORDER — MECLIZINE HYDROCHLORIDE 25 MG/1
25 TABLET ORAL
Qty: 30 TAB | Refills: 0 | Status: SHIPPED | OUTPATIENT
Start: 2019-05-10 | End: 2019-05-20

## 2019-05-10 RX ORDER — ONDANSETRON 8 MG/1
8 TABLET, ORALLY DISINTEGRATING ORAL
Qty: 20 TAB | Refills: 0 | Status: SHIPPED | OUTPATIENT
Start: 2019-05-10 | End: 2019-09-12

## 2019-05-10 NOTE — TELEPHONE ENCOUNTER
Cricket Jones from [address] [place] [address] is calling to speak to nurse in regards to the [place] fro \CT HEAD WO CONT Deepthi Bench (Order [id number]  Pt has CT Scan today at 2pm  [facility]# [phone]

## 2019-05-10 NOTE — TELEPHONE ENCOUNTER
[phone] spoke to Delaney Hinkle wanted to find out what is Dr. Jorje Boudreaux trying to rule out for CT of the head per Dr dodd ruling out mass/tumor Delaney Hinkle notified and understand

## 2019-05-10 NOTE — PATIENT INSTRUCTIONS
Epley Maneuver at Home for Vertigo: Exercises Your Care Instructions Vertigo is a spinning or whirling sensation when you move your head. Your doctor may have moved you in different positions to help your vertigo get better faster. This is called the Epley maneuver. Your doctor also may have asked you to do these exercises at home. Do the exercises as often as your doctor recommends. If your vertigo is getting worse, your doctor may have you change the exercise or stop it. Step 1 Step 1 1. Sit on the edge of a bed or sofa. Step 2 1. Turn your head 45 degrees in the direction your doctor told you to. This should be toward the ear that causes the most vertigo for you. In this picture, the woman is turning toward her left ear. Step 3 1. Tilt yourself backward until you are lying on your back. Your head should still be at a 45-degree turn. Your head should be about midway between looking straight ahead and looking out to your side. Hold for 30 seconds. If you have vertigo, stay in this position until it stops. Step 4 1. Turn your head 90 degrees toward the ear that has the least vertigo. In this picture, the woman is turning to the right because she has vertigo on her left side. The point of your chin should be raised and over your shoulder. Hold for 30 seconds. Step 5 1. Roll onto the side with the least vertigo. You should now be looking at the floor. Hold for 30 seconds. Follow-up care is a key part of your treatment and safety. Be sure to make and go to all appointments, and call your doctor if you are having problems. It's also a good idea to know your test results and keep a list of the medicines you take. Where can you learn more? Go to http://glenny-macho.info/. Enter [id number] in the search box to learn more about \"Epley Maneuver at Home for Vertigo: Exercises. \" Current as of: Daija 3, 2018 Content Version: 11.9 © 3389-9099 [employer]. Care instructions adapted under license by [employer] (which disclaims liability or warranty for this information). If you have questions about a medical condition or this instruction, always ask your healthcare professional. [address] any warranty or liability for your use of this information.

## 2019-05-10 NOTE — PROGRESS NOTES
Chief Complaint Patient presents with  Dizziness c/o nausea and dizziness x 3 days, dizziness on and off, headache 1 day Reviewed Record in preparation for visit and have obtained necessary documentation. Identified pt with two pt identifiers (Name @ ) Health Maintenance Due Topic  Shingrix Vaccine Age 50> (1 of 2) 1. Have you been to the ER, urgent care clinic since your last visit? Hospitalized since your last visit? no 
 
2. Have you seen or consulted any other health care providers outside of the [address] [place] [address] since your last visit? Include any pap smears or colon screening.  no

## 2019-05-10 NOTE — PROGRESS NOTES
GILBERT Dejesus 62 y.o. female  presents to the office today to discuss dizziness. Blood pressure 131/85, pulse 99, temperature 98.1 °F (36.7 °C), temperature source Oral, resp. rate 18, height 5' 5.5\" (1.664 m), weight 160 lb (72.6 kg), last menstrual period 07/19/2011, SpO2 97 %. Body mass index is 26.22 kg/m². Chief Complaint Patient presents with  Dizziness c/o nausea and dizziness x 3 days, dizziness on and off, headache 1 day Dizziness/Nausea/Headache: Pt reports onset of nausea and dizziness 3 days ago. She also admits to intermittent headaches in the back of her head. She notes nausea wakes her up in the morning that is intermittent. Pt notes nausea causes dry heaving in the morning. Pt also reports indigestion and epigastric discomfort that has been exacerbating nausea. She further reports difficulty concentrating due to the dizziness. She notes she has been treating as if anxiety is contributing, but has not been successful. Balance Disorder: Pt reports difficulty with balance since 12/2018. She had head scan several years ago. She states she walks about 2 miles a few days each week and her  has noticed a change in her walk. She notes he describes her walking as if she were intoxicated. Overweight: I have reviewed/discussed the above normal BMI with the patient. Pt states she has been taking xanax each night to help her sleep. She has followed up for sleep studies that were inconclusive and she notes she has more studies she will need to complete. Current Outpatient Medications Medication Sig Dispense Refill  meclizine (ANTIVERT) 25 mg tablet Take 1 Tab by mouth three (3) times daily as needed for Dizziness for up to 10 days. 30 Tab 0  
 ondansetron (ZOFRAN ODT) 8 mg disintegrating tablet Take 1 Tab by mouth every eight (8) hours as needed for Nausea. 20 Tab 0  promethazine (PHENERGAN) 25 mg/mL injection 1 mL by IntraMUSCular route once for 1 dose. 1 Vial 0  
 ALPRAZolam (XANAX) 0.25 mg tablet TAKE 1 TO 2 TABLETS BY MOUTH AT NIGHT AS NEEDED FOR ANXIETY (Patient taking differently: Take 0.25 mg by mouth nightly as needed. TAKE 1 TO 2 TABLETS BY MOUTH AT NIGHT AS NEEDED FOR ANXIETY) 60 Tab 2  cholecalciferol, vitamin D3, (VITAMIN D3) 2,000 unit tab Take  by mouth. Takes 1-2 x a week  furosemide (LASIX) 20 mg tablet Take 0.5-1 Tabs by mouth daily as needed. Take w/ small glass of OJ or 1/2 banana or other potassium rich food/beverage  Indications: Edema 30 Tab 0 Allergies Allergen Reactions  Latex Other (comments)  Adhesive Rash  Ambien [Zolpidem] Other (comments) Didn't like, vivid dreams  Demerol [Meperidine] Other (comments) GI  
 Morphine Other (comments) Chest pain  Other Medication Other (comments) Nickel Past Medical History:  
Diagnosis Date  Allergic reaction to nickel 7/20/2011  Anxiety 7/20/2011  Chronic insomnia 4/16/2015  Colon polyp 5/19/2010  Controlled substance agreement signed 5/4/2017 5-2017  Dependent Edema of BLE 11/3/2017  Depression 5/19/2010  Family history of breast cancer 5/19/2010  Family history of diabetes mellitus (DM) 5/19/2010  
 H/O Post partum depression 7/20/2011  Hypercholesterolemia 5/4/2017  Low back pain 2/1/2017 Xrays 5-2016, normal, did PT  
 Menopausal symptoms 4/3/2014 Gyn Dr Codi Paniagua, pt declined HRT  Migraine 5/19/2010  Negative Screening for IHSS, +FHx 5/19/2010  Panic disorder 5/19/2010  Postmenopause, LMP 53, No HRT 4/16/2015  Psoriasis  Recurrent cystitis 4/28/2016  Screening exams for skin cancer 5/4/2017 Per Derm Dr Delfin Romero  Vitamin D deficiency 7/25/2011 Past Surgical History:  
Procedure Laterality Date [facility] BIOPSY CERVIX  2003  
 negative  COLONOSCOPY  2002  
 benign polyp; Dr Latisha Rodriguez  
 successful  HX COLONOSCOPY  5/2005 normal, f/u 10 yrs, Dr Yu David  HX COLONOSCOPY  2018  
 polypectomy x 2, repeat 5 yrs, Dr. Yu David  HX DILATION AND CURETTAGE  2009  
 negative  HX GI   Nissen Fundoplication for severe reflux  HX HEMORRHOIDECTOMY  3/2002 20[facility] excised right chest  
 XR CHEST PA LAT  10/2008  
 normal  
 
Family History Problem Relation Age of Onset  Breast Cancer Mother 46  Cancer Mother   
      of lung cancer, 59; smoker  Cancer Father   
      of lung cancer, 76; smoker  Diabetes Father  Heart Disease Brother   
      IHSS age 32  
 Heart Attack Brother  Hypertension Brother  Hypertension Brother  High Cholesterol Brother  Breast Cancer Maternal Grandmother 48  
 Heart Disease Maternal Grandmother 61  Diabetes Paternal Grandmother  Heart Disease Paternal Grandmother   
      CHF 76  Stroke Paternal Grandfather 79  Breast Cancer Maternal Aunt 36  
 Diabetes Paternal Aunt  Diabetes Paternal Uncle  Other Son Asperger's Syndrome Social History Tobacco Use  Smoking status: Never Smoker  Smokeless tobacco: Never Used Substance Use Topics  Alcohol use: No  
  Comment: none since ; usually very occ small glass of wine on special occasions now the past year, prior to that she had 1-2 glasses in the evenings a few x a week Review of Systems Constitutional: Negative for chills and fever. HENT: Negative for hearing loss and tinnitus. Eyes: Negative for blurred vision and double vision. Respiratory: Negative for shortness of breath. Cardiovascular: Negative for chest pain and palpitations. Gastrointestinal: Negative for nausea and vomiting. Genitourinary: Negative for dysuria and frequency. Musculoskeletal: Negative for back pain and falls. Skin: Negative for itching and rash. Neurological: Negative for dizziness, loss of consciousness and headaches. Psychiatric/Behavioral: Negative for depression. The patient is nervous/anxious. Physical Exam  
Constitutional: She is oriented to person, place, and time. She appears well-developed and well-nourished. HENT:  
Head: Normocephalic and atraumatic. Right Ear: External ear normal.  
Left Ear: External ear normal.  
Nose: Nose normal.  
Mouth/Throat: Oropharynx is clear and moist.  
Eyes: Conjunctivae and EOM are normal.  
Neck: Normal range of motion. Neck supple. Carotid bruit is not present. No thyroid mass and no thyromegaly present. Cardiovascular: Normal rate, regular rhythm, S1 normal, S2 normal, normal heart sounds and intact distal pulses. Pulmonary/Chest: Effort normal and breath sounds normal.  
Abdominal: Soft. Normal appearance and bowel sounds are normal. There is no hepatosplenomegaly. There is no tenderness. Musculoskeletal: Normal range of motion. Neurological: She is alert and oriented to person, place, and time. She has normal strength. No cranial nerve deficit or sensory deficit. Coordination and gait abnormal.  
Cranial nerves intact, afocal; Romberg positive; pt unable to walk in straight line Skin: Skin is warm, dry and intact. No abrasion and no rash noted. Psychiatric: She has a normal mood and affect. Her behavior is normal. Judgment and thought content normal.  
Nursing note and vitals reviewed. ASSESSMENT and PLAN Diagnoses and all orders for this visit: 1. Vestibular dizziness Advised pt she will nee head CT, concerned about severe dizziness that has been slowly progressing over last 5 months. Provided pt with Rx for meclizine to help manage dizziness.  
-     CT HEAD WO CONT; Future 
-     meclizine (ANTIVERT) 25 mg tablet; Take 1 Tab by mouth three (3) times daily as needed for Dizziness for up to 10 days.  
 
2. Intractable headache, unspecified chronicity pattern, unspecified headache type Advised pt to follow up for carotid duplex and head CT to ensure no neurological or cardiac abnormalities contributing.  
-     CT HEAD WO CONT; Future -     DUPLEX CAROTID BILATERAL; Future 3. Nausea Advised pt to start on Zofran and Phenergan; administered Promethazine injection today in office for severe nausea. Pt will also need head CT.  
-     CT HEAD WO CONT; Future 
-     ondansetron (ZOFRAN ODT) 8 mg disintegrating tablet; Take 1 Tab by mouth every eight (8) hours as needed for Nausea. -     promethazine (PHENERGAN) 25 mg/mL injection; 1 mL by IntraMUSCular route once for 1 dose. -     PROMETHAZINE HCL INJECTION 
-     [place] THER/PROPH/DIAG INJECTION, SUBCUT/IM 4. Balance disorder Concerned about gait instability and abnormal coordination. Advised pt that she will need to follow up for CT of head and carotid duplex. -     CT HEAD WO CONT; Future -     DUPLEX CAROTID BILATERAL; Future 5. Overweight I have reviewed/discussed the above normal BMI with the patient. I have recommended the following interventions: dietary management education, guidance, and counseling, encourage exercise and monitor weight . Follow-up and Dispositions · Return if symptoms worsen or fail to improve, for dizziness. Medication risks/benefits/costs/interactions/alternatives discussed with patient. Advised patient to call back or return to office if symptoms worsen/change/persist.  If patient cannot reach us or should anything more severe/urgent arise he/she should proceed directly to the nearest emergency department. Discussed expected course/resolution/complications of diagnosis in detail with patient. Patient given a written after visit summary which includes her diagnoses, current medications and vitals. Patient expressed understanding with the diagnosis and plan.  
 
Written by lisandra Ivory, as dictated by Eugenio Villegas M.D. 
 
9:22 AM - 9:46 AM 
 
 Total time spent with the patient 24 minutes, greater than 50% of time spent counseling patient.

## 2019-05-11 LAB
25(OH)D3+25(OH)D2 SERPL-MCNC: 32.6 NG/ML (ref 30–100)
ALBUMIN SERPL-MCNC: 4.8 G/DL (ref 3.5–5.5)
ALBUMIN/GLOB SERPL: 1.9 {RATIO} (ref 1.2–2.2)
ALP SERPL-CCNC: 61 IU/L (ref 39–117)
ALT SERPL-CCNC: 23 IU/L (ref 0–32)
AST SERPL-CCNC: 17 IU/L (ref 0–40)
BILIRUB SERPL-MCNC: 0.3 MG/DL (ref 0–1.2)
BUN SERPL-MCNC: 13 MG/DL (ref 6–24)
BUN/CREAT SERPL: 14 (ref 9–23)
CALCIUM SERPL-MCNC: 9.7 MG/DL (ref 8.7–10.2)
CHLORIDE SERPL-SCNC: 104 MMOL/L (ref 96–106)
CHOLEST SERPL-MCNC: 248 MG/DL (ref 100–199)
CO2 SERPL-SCNC: 23 MMOL/L (ref 20–29)
CREAT SERPL-MCNC: 0.91 MG/DL (ref 0.57–1)
ERYTHROCYTE [DISTWIDTH] IN BLOOD BY AUTOMATED COUNT: 13.2 % (ref 12.3–15.4)
GLOBULIN SER CALC-MCNC: 2.5 G/DL (ref 1.5–4.5)
GLUCOSE SERPL-MCNC: 97 MG/DL (ref 65–99)
HCT VFR BLD AUTO: 42.2 % (ref 34–46.6)
HDLC SERPL-MCNC: 74 MG/DL
HGB BLD-MCNC: 14.3 G/DL (ref 11.1–15.9)
INTERPRETATION, 910389: NORMAL
LDLC SERPL CALC-MCNC: 163 MG/DL (ref 0–99)
LEFT CCA DIST DIAS: 15.9 CM/S
LEFT CCA DIST SYS: 49.3 CM/S
LEFT CCA PROX DIAS: 25.6 CM/S
LEFT CCA PROX SYS: 82.3 CM/S
LEFT ECA DIAS: 7.5 CM/S
LEFT ECA SYS: 49.4 CM/S
LEFT ICA DIST DIAS: 28.4 CM/S
LEFT ICA DIST SYS: 61.6 CM/S
LEFT ICA MID DIAS: 27.6 CM/S
LEFT ICA MID SYS: 51.1 CM/S
LEFT ICA PROX DIAS: 20.6 CM/S
LEFT ICA PROX SYS: 43.1 CM/S
LEFT ICA/CCA SYS: 1.25
LEFT VERTEBRAL DIAS: 13.95 CM/S
LEFT VERTEBRAL SYS: 36 CM/S
MCH RBC QN AUTO: 32.5 PG (ref 26.6–33)
MCHC RBC AUTO-ENTMCNC: 33.9 G/DL (ref 31.5–35.7)
MCV RBC AUTO: 96 FL (ref 79–97)
PLATELET # BLD AUTO: 269 X10E3/UL (ref 150–379)
POTASSIUM SERPL-SCNC: 4.9 MMOL/L (ref 3.5–5.2)
PROT SERPL-MCNC: 7.3 G/DL (ref 6–8.5)
RBC # BLD AUTO: 4.4 X10E6/UL (ref 3.77–5.28)
RIGHT CCA DIST DIAS: 20.3 CM/S
RIGHT CCA DIST SYS: 63.1 CM/S
RIGHT CCA PROX DIAS: 16 CM/S
RIGHT CCA PROX SYS: 68.3 CM/S
RIGHT ECA DIAS: 8.04 CM/S
RIGHT ECA SYS: 48.6 CM/S
RIGHT ICA DIST DIAS: 31.7 CM/S
RIGHT ICA DIST SYS: 70.3 CM/S
RIGHT ICA MID DIAS: 40.4 CM/S
RIGHT ICA MID SYS: 83.1 CM/S
RIGHT ICA PROX DIAS: 29.4 CM/S
RIGHT ICA PROX SYS: 60.7 CM/S
RIGHT ICA/CCA SYS: 1.3
RIGHT VERTEBRAL DIAS: 9.32 CM/S
RIGHT VERTEBRAL SYS: 28.6 CM/S
SODIUM SERPL-SCNC: 142 MMOL/L (ref 134–144)
TRIGL SERPL-MCNC: 56 MG/DL (ref 0–149)
TSH SERPL DL<=0.005 MIU/L-ACNC: 1.37 UIU/ML (ref 0.45–4.5)
VLDLC SERPL CALC-MCNC: 11 MG/DL (ref 5–40)
WBC # BLD AUTO: 3.9 X10E3/UL (ref 3.4–10.8)

## 2019-05-11 NOTE — PROGRESS NOTES
Advice her to see neuro if symptoms persist    TECHNIQUE: Unenhanced CT of the head was performed using 5 mm images. Brain and  bone windows were generated.  CT dose reduction was achieved through use of a  standardized protocol tailored for this examination and automatic exposure  control for dose modulation.      FINDINGS:  The ventricles and sulci are normal in size, shape and configuration and  midline. There is no significant white matter disease. There is no intracranial  hemorrhage, extra-axial collection, mass, mass effect or midline shift.  The  basilar cisterns are open. No acute infarct is identified. The bone windows  demonstrate no abnormalities. The visualized portions of the paranasal sinuses  and mastoid air cells are clear.     Impression    IMPRESSION:      No acute intracranial abnormality

## 2019-05-12 NOTE — PROGRESS NOTES
Result Text · Minimal to no stenosis in the right internal carotid artery. · Minimal to no stenosis in the left internal carotid artery. · Vertebrals are patent with antegrade flow bilaterally.

## 2019-05-13 DIAGNOSIS — G44.001 INTRACTABLE CLUSTER HEADACHE SYNDROME, UNSPECIFIED CHRONICITY PATTERN: Primary | ICD-10-CM

## 2019-05-13 NOTE — PROGRESS NOTES
Viewed by Radha Wylie on 5/11/2019  3:14 PM   Patient aware via my chart   Referral order ok per Dr Darren Kern

## 2019-05-17 ENCOUNTER — OFFICE VISIT (OUTPATIENT)
Dept: FAMILY MEDICINE CLINIC | Age: 59
End: 2019-05-17

## 2019-05-17 VITALS
SYSTOLIC BLOOD PRESSURE: 122 MMHG | BODY MASS INDEX: 26.73 KG/M2 | DIASTOLIC BLOOD PRESSURE: 72 MMHG | RESPIRATION RATE: 16 BRPM | HEIGHT: 65 IN | TEMPERATURE: 98.2 F | WEIGHT: 160.4 LBS | HEART RATE: 77 BPM | OXYGEN SATURATION: 98 %

## 2019-05-17 DIAGNOSIS — R42 VERTIGO: ICD-10-CM

## 2019-05-17 DIAGNOSIS — R35.0 FREQUENCY OF URINATION: ICD-10-CM

## 2019-05-17 DIAGNOSIS — Z00.00 ROUTINE GENERAL MEDICAL EXAMINATION AT A HEALTH CARE FACILITY: Primary | ICD-10-CM

## 2019-05-17 DIAGNOSIS — N30.00 ACUTE CYSTITIS WITHOUT HEMATURIA: ICD-10-CM

## 2019-05-17 DIAGNOSIS — E78.00 HYPERCHOLESTEROLEMIA: ICD-10-CM

## 2019-05-17 LAB
BILIRUB UR QL STRIP: NEGATIVE
GLUCOSE UR-MCNC: NEGATIVE MG/DL
KETONES P FAST UR STRIP-MCNC: NEGATIVE MG/DL
PH UR STRIP: 5.5 [PH] (ref 4.6–8)
PROT UR QL STRIP: NEGATIVE
SP GR UR STRIP: 1.01 (ref 1–1.03)
UA UROBILINOGEN AMB POC: NORMAL (ref 0.2–1)
URINALYSIS CLARITY POC: CLEAR
URINALYSIS COLOR POC: YELLOW
URINE BLOOD POC: NORMAL
URINE LEUKOCYTES POC: NORMAL
URINE NITRITES POC: NEGATIVE

## 2019-05-17 RX ORDER — NITROFURANTOIN 25; 75 MG/1; MG/1
100 CAPSULE ORAL 2 TIMES DAILY
Qty: 6 CAP | Refills: 0 | Status: SHIPPED | OUTPATIENT
Start: 2019-05-17 | End: 2019-05-20

## 2019-05-17 NOTE — PROGRESS NOTES
Chief Complaint   Patient presents with    Complete Physical     had labs done 5-10-19    Urinary Frequency     2 weeks     HISTORY OF PRESENT ILLNESS   HPI  Annual CPE  Had fasting labs done 5-1019, here to review. High cholesterol but good HD>  Follows sensible, regular diet. Admits she could cut back on the snacks at night, has a tall glass of milk every night and thinks she can stop this and it might help. Walks 2 miles several days a week for exercise. Wt stable. CV ROS negative. Family hx updated below. Depression/Anxiety  Started seeing new psychiatrist a few months ago. She has taken over management of her Xanax. They are discussing mgt options for depression and chronic insomnia. They are waiting for her sleep study to be done first.     Urinary Frequency  2 week h/o increased urination and nocturia, mild urgency. Denies burning, hematuria, flank pain, incontinence. Past h/o recurrent cystitis but states she hasnt had one in over a year. Vertigo Follow up  Saw Dr. Laurie Ferguson last week for positional vertigo symptoms and nausea. She was given a Phenergan injection which she states worked remarkably well. She was prescribed Zofran and Antivert prn. She is getting better but not 100% back to normal.  She feels a little woozy if she moves too suddenly. The nausea is much better. I reviewed interim carotid doppler report and Head Ct scan, both negative. Dr. Laurie Ferguson advised she see neuro if not improving. Lots of stress and some tension across front of head and in TMJ's. She is working w/ her psychiatrist on some stress mgt as well. REVIEW OF SYMPTOMS   Review of Systems   Constitutional: Negative. Eyes: Negative. Respiratory: Negative. Cardiovascular: Negative. Negative for chest pain, palpitations, claudication and leg swelling (used Lasix in past for dependent edema in lower legs after a long trip, has not needed it since then and her legs are fine).    Gastrointestinal: Negative. Genitourinary: Positive for frequency and urgency. Negative for dysuria, flank pain and hematuria. Musculoskeletal: Negative. Neurological: Negative for tingling, sensory change, speech change, focal weakness and weakness.            PROBLEM LIST/MEDICAL HISTORY     Problem List  Date Reviewed: 5/17/2019          Codes Class Noted    Dependent Edema of BLE ICD-10-CM: R60.9  ICD-9-CM: 782.3  11/3/2017        Controlled substance agreement signed ICD-10-CM: Z79.899  ICD-9-CM: V58.69  5/4/2017    Overview Addendum 3/13/2018  3:14 PM by Michelle Rodgers MD     5-3070, 3-2018             Hypercholesterolemia ICD-10-CM: E78.00  ICD-9-CM: 272.0  5/4/2017        Screening exams for skin cancer ICD-10-CM: Z12.83  ICD-9-CM: V76.43  5/4/2017    Overview Signed 5/4/2017  1:07 PM by Michelle Rodgers MD     Per Derm Dr Bibiana Plata             Low back pain ICD-10-CM: M54.5  ICD-9-CM: 724.2  2/1/2017    Overview Signed 2/1/2017  8:33 AM by Michelle Rodgers MD     Xrays 0-2540, normal, did PT             Recurrent cystitis ICD-10-CM: N30.90  ICD-9-CM: 595.9  4/28/2016    Overview Signed 3/13/2018  3:29 PM by Michelle Rodgers MD      2014             Chronic insomnia ICD-10-CM: F51.04  ICD-9-CM: 780.52  4/16/2015    Overview Addendum 4/28/2016 11:45 AM by Michelle Rodgers MD     Psych rx'd [facility] worked really well, but insurance stopped covering             Postmenopause, LMP 48, No HRT ICD-10-CM: Z78.0  ICD-9-CM: V49.81  4/16/2015        Menopausal symptoms ICD-10-CM: N95.1  ICD-9-CM: 627.2  4/3/2014    Overview Signed 4/3/2014 12:01 PM by Michelle Rodgers MD     Gyn Dr Lyndon Alves, pt declined HRT             Vitamin D deficiency ICD-10-CM: E55.9  ICD-9-CM: 268.9  7/25/2011    Overview Signed 7/25/2011 10:54 PM by Michelle Rodgers MD     7/2011             Anxiety ICD-10-CM: F41.9  ICD-9-CM: 300.00  7/20/2011    Overview Signed 5/17/2019  9:36 AM by Michelle Rodgers Kendra Pinto MD     1997; Psych Dr Miguel Rod (retired 2016), changed to new psychiatrist Dr. Mei Mcginnis since spring 2018             H/O Post partum depression ICD-10-CM: O99.345, F53.0  ICD-9-CM: 648.44, 311  7/20/2011    Overview Signed 7/20/2011  9:14 AM by MD Christal Benoit             Psoriasis ICD-10-CM: L40.9  ICD-9-CM: 696.1  7/20/2011    Overview Signed 7/20/2011  9:18 AM by Anshu Soliman MD     Derm Dr Richa Pablo             Allergic reaction to nickel ICD-10-CM: L23.0  ICD-9-CM: 692.83  7/20/2011        Panic disorder ICD-10-CM: F41.0  ICD-9-CM: 300.01  5/19/2010    Overview Addendum 5/17/2019  9:36 AM by Anshu Soliman MD     Psych Dr Destiny Crowder (retired 2016); 1997; Psych Dr Miguel Rod (retired 2016), changed to new psychiatrist Dr. Mei Mcginnis since spring 2018             Depression ICD-10-CM: F32.9  ICD-9-CM: 054  5/19/2010    Overview Addendum 5/17/2019  9:35 AM by Anshu Soliman MD     1997; Psych Dr Miguel Rod (retired 2016), changed to new psychiatrist Dr. Mei Mcginnis since spring 2018             Colon polyp ICD-10-CM: K63.5  ICD-9-CM: 211.3  5/19/2010    Overview Signed 5/19/2010 10:06 AM by Jordan Hsieh     Benign              Family history of breast cancer ICD-10-CM: Z80.3  ICD-9-CM: V16.3  5/19/2010        Family history of diabetes mellitus (DM) ICD-10-CM: Z83.3  ICD-9-CM: V18.0  5/19/2010        Migraine ICD-10-CM: B49.306  ICD-9-CM: 346.90  5/19/2010    Overview Addendum 4/3/2014 11:40 AM by Anshu Soliman MD     4/04;  Acute migraine w/ neuro changes summer 2013, [address] [facility] [facility] ER, CT scan negative             Screening for cardiovascular condition ICD-10-CM: Z13.6  ICD-9-CM: V81.2  5/19/2010    Overview Signed 5/19/2010 10:09 AM by Jordan Hsieh     For IHSS (negative)                       PAST SURGICAL HISTORY     Past Surgical History:   Procedure Laterality Date    BIOPSY CERVIX  2003    negative    COLONOSCOPY  2002    benign polyp; Dr Baltazar Barrios    successful    HX COLONOSCOPY  5/2005    normal, f/u 10 yrs, Dr John Silva HX COLONOSCOPY  03/09/2018    polypectomy x 2, repeat 5 yrs, Dr. Jessenia Fields  2009    negative    HX GI  2001    Nissen Fundoplication for severe reflux    HX HEMORRHOIDECTOMY  3/2002    HX MALIGNANT SKIN LESION EXCISION  1994    BCC excised right chest    XR CHEST PA LAT  10/2008    normal         MEDICATIONS     Current Outpatient Medications   Medication Sig    ALPRAZolam (XANAX) 0.25 mg tablet TAKE 1 TO 2 TABLETS BY MOUTH AT NIGHT AS NEEDED FOR ANXIETY (Patient taking differently: Take 0.25 mg by mouth nightly as needed. TAKE 1 TO 2 TABLETS BY MOUTH AT NIGHT AS NEEDED FOR ANXIETY)    cholecalciferol, vitamin D3, (VITAMIN D3) 2,000 unit tab Take  by mouth daily. Restarted 2-2019 daily    meclizine (ANTIVERT) 25 mg tablet Take 1 Tab by mouth three (3) times daily as needed for Dizziness for up to 10 days.  ondansetron (ZOFRAN ODT) 8 mg disintegrating tablet Take 1 Tab by mouth every eight (8) hours as needed for Nausea. No current facility-administered medications for this visit.            ALLERGIES     Allergies   Allergen Reactions    Latex Other (comments)    Adhesive Rash    Ambien [Zolpidem] Other (comments)     Didn't like, vivid dreams    Demerol [Meperidine] Other (comments)     GI    Morphine Other (comments)     Chest pain    Other Medication Other (comments)     Nickel          SOCIAL HISTORY     Social History     Socioeconomic History    Marital status:      Spouse name: Not on file    Number of children: 1    Years of education: Not on file    Highest education level: Not on file   Occupational History    Occupation: Dental Hygenist, part time, 2 x a week   Tobacco Use    Smoking status: Never Smoker    Smokeless tobacco: Never Used   Substance and Sexual Activity    Alcohol use: No     Comment: none since 2-2016; usually very occ small glass of wine on special occasions now the past year, prior to that she had 1-2 glasses in the evenings a few x a week    Drug use: No    Sexual activity: Yes     Partners: Male   Other Topics Concern    Caffeine Concern No     Comment: 1 small cup of coffee a day    Weight Concern No     Comment: stable over time in the 160's    Special Diet No    Exercise Yes     Comment: walks 2 miles several days a week        IMMUNIZATIONS  Immunization History   Administered Date(s) Administered    Influenza Nasal Vaccine 2014    Influenza Vaccine 2018    Influenza Vaccine (Quad) PF 2018    Tdap 2015         FAMILY HISTORY     Family History   Problem Relation Age of Onset    Breast Cancer Mother 46    Cancer Mother          of lung cancer, 59; smoker    Cancer Father          of lung cancer, 76; smoker    Diabetes Father     Heart Disease Brother          IHSS age 32    Heart Attack Brother     Hypertension Brother     Hypertension Brother     High Cholesterol Brother     Breast Cancer Maternal Grandmother 48    Heart Disease Maternal Grandmother 61    Diabetes Paternal Grandmother     Heart Disease Paternal Grandmother          CHF 76    Stroke Paternal Grandfather 79    Breast Cancer Maternal Aunt 36    Diabetes Paternal Aunt     Diabetes Paternal Uncle     Other Son         Asperger's Syndrome         VITALS     Visit Vitals  /72 (BP 1 Location: Left arm, BP Patient Position: Sitting)   Pulse 77   Temp 98.2 °F (36.8 °C) (Oral)   Resp 16   Ht 5' 5\" (1.651 m)   Wt 160 lb 6.4 oz (72.8 kg)   LMP 2011   SpO2 98%   BMI 26.69 kg/m²          PHYSICAL EXAMINATION   Physical Exam   Constitutional: She is oriented to person, place, and time and well-developed, well-nourished, and in no distress.    HENT:   Right Ear: Tympanic membrane normal.   Left Ear: Tympanic membrane normal.   Nose: Nose normal.   Mouth/Throat: Oropharynx is clear and moist. No oropharyngeal exudate. Eyes: Pupils are equal, round, and reactive to light. Conjunctivae and EOM are normal.   Neck: Neck supple. Carotid bruit is not present. No thyromegaly present. Cardiovascular: Normal rate, regular rhythm and normal heart sounds. No murmur heard. Pulmonary/Chest: Effort normal.   Abdominal: Soft. Bowel sounds are normal. She exhibits no distension and no mass. There is no tenderness. Musculoskeletal: Normal range of motion. She exhibits no edema or tenderness. Lymphadenopathy:     She has no cervical adenopathy. Neurological: She is alert and oriented to person, place, and time. No cranial nerve deficit. Gait normal.   Non focal neuro exam   Skin: Skin is warm and dry. Psychiatric: Mood and affect normal.   Vitals reviewed. DIAGNOSTIC DATA   Result Notes for CT HEAD WO CONT     Notes recorded by Tristan Valenzuela LPN on 9/09/9652 at 7:42 AM EDT  Viewed by Nitesh Montes De Oca on 5/11/2019  3:14 PM   Patient aware via my chart   Referral order ok per Dr Marco Antonio Mensah  ------    Notes recorded by Jose Merida MD on 5/11/2019 at 2:58 PM EDT  Refer to Dr. Atul Baumann for further evaluation  ------    Notes recorded by Jose Merida MD on 5/11/2019 at 2:57 PM EDT  Advice her to see neuro if symptoms persist    TECHNIQUE: Unenhanced CT of the head was performed using 5 mm images. Brain and  bone windows were generated.  CT dose reduction was achieved through use of a  standardized protocol tailored for this examination and automatic exposure  control for dose modulation.      FINDINGS:  The ventricles and sulci are normal in size, shape and configuration and  midline. There is no significant white matter disease. There is no intracranial  hemorrhage, extra-axial collection, mass, mass effect or midline shift.  The  basilar cisterns are open. No acute infarct is identified. The bone windows  demonstrate no abnormalities.  The visualized portions of the paranasal sinuses  and mastoid air cells are clear. Impression   IMPRESSION:      No acute intracranial abnormality     Interpretation Summary     · Minimal to no stenosis in the right internal carotid artery. · Minimal to no stenosis in the left internal carotid artery. · Vertebrals are patent with antegrade flow bilaterally. Cerebrovascular Findings     Right Carotid     The right CCA is patent. There is intimal thickening present in the right CCA. There is minimal stenosis in the right ICA. There is minimal stenosis in the right ECA. The right vertebral is antegrade. Left Carotid     The left CCA is patent. There is intimal thickening present in the left CCA. There is minimal stenosis in the left ICA. The left ECA is patent. The left vertebral is antegrade.            LABORATORY DATA     Results for orders placed or performed in visit on 05/17/19   AMB POC URINALYSIS DIP STICK AUTO W/O MICRO   Result Value Ref Range    Color (UA POC) Yellow     Clarity (UA POC) Clear     Glucose (UA POC) Negative Negative    Bilirubin (UA POC) Negative Negative    Ketones (UA POC) Negative Negative    Specific gravity (UA POC) 1.015 1.001 - 1.035    Blood (UA POC) 1+ Negative    pH (UA POC) 5.5 4.6 - 8.0    Protein (UA POC) Negative Negative    Urobilinogen (UA POC) 0.2 mg/dL 0.2 - 1    Nitrites (UA POC) Negative Negative    Leukocyte esterase (UA POC) 1+ Negative       Lab Results   Component Value Date/Time    WBC 3.9 05/10/2019 10:10 AM    HGB 14.3 05/10/2019 10:10 AM    HCT 42.2 05/10/2019 10:10 AM    PLATELET 531 50/95/9361 10:10 AM    MCV 96 05/10/2019 10:10 AM     Lab Results   Component Value Date/Time    Cholesterol, total 248 (H) 05/10/2019 10:10 AM    HDL Cholesterol 74 05/10/2019 10:10 AM    LDL, calculated 163 (H) 05/10/2019 10:10 AM    Triglyceride 56 05/10/2019 10:10 AM     Lab Results   Component Value Date/Time    TSH 1.370 05/10/2019 10:10 AM      Lab Results   Component Value Date/Time    Sodium 142 05/10/2019 10:10 AM    Potassium 4.9 05/10/2019 10:10 AM    Chloride 104 05/10/2019 10:10 AM    CO2 23 05/10/2019 10:10 AM    Glucose 97 05/10/2019 10:10 AM    BUN 13 05/10/2019 10:10 AM    Creatinine 0.91 05/10/2019 10:10 AM    BUN/Creatinine ratio 14 05/10/2019 10:10 AM    GFR est AA 80 05/10/2019 10:10 AM    GFR est non-AA 70 05/10/2019 10:10 AM    Calcium 9.7 05/10/2019 10:10 AM    Bilirubin, total 0.3 05/10/2019 10:10 AM    ALT (SGPT) 23 05/10/2019 10:10 AM    AST (SGOT) 17 05/10/2019 10:10 AM    Alk. phosphatase 61 05/10/2019 10:10 AM    Protein, total 7.3 05/10/2019 10:10 AM    Albumin 4.8 05/10/2019 10:10 AM    A-G Ratio 1.9 05/10/2019 10:10 AM          ASSESSMENT & PLAN       ICD-10-CM ICD-9-CM    1. Routine general medical examination at a health care facility Z00.00 V70.0    2. Hypercholesterolemia E78.00 272.0 LIPID PANEL   3. Frequency of urination R35.0 788.41 AMB POC URINALYSIS DIP STICK AUTO W/O MICRO   4. Acute cystitis without hematuria N30.00 595.0 CULTURE, URINE      nitrofurantoin, macrocrystal-monohydrate, (MACROBID) 100 mg capsule   5. Vertigo R42 780.4      Wellness/Cholesterol  Fasting lab results from 5-10-19 (copied above) and schedule of future lab studies reviewed with patient  Cardiovascular risk and specific lipid/LDL goals reviewed  She would like to work on dietary modifications and come back for fasting follow up cholesterol check in 3 months. I preordered labs so she can get them done again a week prior. Referenced AHA Cholesterol Lowering Diet and also discussed low cholesterol/heart healthy diet, handouts given. Continue regular exercise and work on wt reduction, target ~ 10 lbs for now. Other labs all good and stable    UTI  Urine sent for C&S  Empirically start Macrobid 100 mg bid x 3 days  Increase fluid intake. Cranberry juice. Vertigo Follow up  Saw Dr. Silvia Cruise last week for positional vertigo symptoms and nausea.   She was given a Phenergan injection which she states worked remarkably well. She was prescribed Zofran and Antivert prn. She is getting better but not 100% back to normal.  She feels a little woozy if she moves too suddenly. The nausea is much better. I reviewed interim carotid doppler report and Head Ct scan, both negative. Dr. Vigil Began advised she see neuro if not improving and I endorsed that. ER for any worsening or new/concerning symptoms as counseled at length today. She will cont Antivert prn for now. Medication precautions. I have also given her some home vertigo exercises to do.

## 2019-05-17 NOTE — PROGRESS NOTES
Chief Complaint   Patient presents with    Physical     had labs already    Urinary Frequency     2 weeks     1. Have you been to the ER, urgent care clinic since your last visit? Hospitalized since your last visit? No    2. Have you seen or consulted any other health care providers outside of the [address] [place] [address] since your last visit? Include any pap smears or colon screening.  No

## 2019-05-17 NOTE — PATIENT INSTRUCTIONS
High Cholesterol: Care Instructions  Your Care Instructions    Cholesterol is a type of fat in your blood. It is needed for many body functions, such as making new cells. Cholesterol is made by your body. It also comes from food you eat. High cholesterol means that you have too much of the fat in your blood. This raises your risk of a heart attack and stroke. LDL and HDL are part of your total cholesterol. LDL is the \"bad\" cholesterol. High LDL can raise your risk for heart disease, heart attack, and stroke. HDL is the \"good\" cholesterol. It helps clear bad cholesterol from the body. High HDL is linked with a lower risk of heart disease, heart attack, and stroke. Your cholesterol levels help your doctor find out your risk for having a heart attack or stroke. You and your doctor can talk about whether you need to lower your risk and what treatment is best for you. A heart-healthy lifestyle along with medicines can help lower your cholesterol and your risk. The way you choose to lower your risk will depend on how high your risk is for heart attack and stroke. It will also depend on how you feel about taking medicines. Follow-up care is a key part of your treatment and safety. Be sure to make and go to all appointments, and call your doctor if you are having problems. It's also a good idea to know your test results and keep a list of the medicines you take. How can you care for yourself at home? · Eat a variety of foods every day. Good choices include fruits, vegetables, whole grains (like oatmeal), dried beans and peas, nuts and seeds, soy products (like tofu), and fat-free or low-fat dairy products. · Replace butter, margarine, and hydrogenated or partially hydrogenated oils with olive and canola oils. (Canola oil margarine without trans fat is fine.)  · Replace red meat with fish, poultry, and soy protein (like tofu). · Limit processed and packaged foods like chips, crackers, and cookies.   · Bake, broil, or steam foods. Don't oswald them. · Be physically active. Get at least 30 minutes of exercise on most days of the week. Walking is a good choice. You also may want to do other activities, such as running, swimming, cycling, or playing tennis or team sports. · Stay at a healthy weight or lose weight by making the changes in eating and physical activity listed above. Losing just a small amount of weight, even 5 to 10 pounds, can reduce your risk for having a heart attack or stroke. · Do not smoke. When should you call for help? Watch closely for changes in your health, and be sure to contact your doctor if:    · You need help making lifestyle changes.     · You have questions about your medicine. Where can you learn more? Go to http://glennyLuminosomacho.info/. Enter L627 in the search box to learn more about \"High Cholesterol: Care Instructions. \"  Current as of: July 22, 2018  Content Version: 11.9  © 0755-3650 [employer]. Care instructions adapted under license by [employer] (which disclaims liability or warranty for this information). If you have questions about a medical condition or this instruction, always ask your healthcare professional. [place] [address] any warranty or liability for your use of this information. Epley Maneuver at Home for Vertigo: Exercises  Your Care Instructions  Vertigo is a spinning or whirling sensation when you move your head. Your doctor may have moved you in different positions to help your vertigo get better faster. This is called the Epley maneuver. Your doctor also may have asked you to do these exercises at home. Do the exercises as often as your doctor recommends. If your vertigo is getting worse, your doctor may have you change the exercise or stop it. Step 1  Step 1    1. Sit on the edge of a bed or sofa. Step 2    1. Turn your head 45 degrees in the direction your doctor told you to.  This should be toward the ear that causes the most vertigo for you. In this picture, the woman is turning toward her left ear. Step 3    1. Tilt yourself backward until you are lying on your back. Your head should still be at a 45-degree turn. Your head should be about midway between looking straight ahead and looking out to your side. Hold for 30 seconds. If you have vertigo, stay in this position until it stops. Step 4    1. Turn your head 90 degrees toward the ear that has the least vertigo. In this picture, the woman is turning to the right because she has vertigo on her left side. The point of your chin should be raised and over your shoulder. Hold for 30 seconds. Step 5    1. Roll onto the side with the least vertigo. You should now be looking at the floor. Hold for 30 seconds. Follow-up care is a key part of your treatment and safety. Be sure to make and go to all appointments, and call your doctor if you are having problems. It's also a good idea to know your test results and keep a list of the medicines you take. Where can you learn more? Go to http://glenny-macho.info/. Enter 470 3910 in the search box to learn more about \"Epley Maneuver at Home for Vertigo: Exercises. \"  Current as of: Daija 3, 2018  Content Version: 11.9  © 2963-1400 [employer]. Care instructions adapted under license by [employer] (which disclaims liability or warranty for this information). If you have questions about a medical condition or this instruction, always ask your healthcare professional. [place] [address] any warranty or liability for your use of this information.

## 2019-05-18 LAB — BACTERIA UR CULT: NORMAL

## 2019-05-19 ENCOUNTER — TELEPHONE (OUTPATIENT)
Dept: FAMILY MEDICINE CLINIC | Age: 59
End: 2019-05-19

## 2019-05-19 DIAGNOSIS — R31.29 MICROHEMATURIA: Primary | ICD-10-CM

## 2019-05-20 NOTE — TELEPHONE ENCOUNTER
Urine culture negative for UTI. How are her symptoms now? Need to repeat UA in 1 week to be sure the microhematuria has cleared up (order pended). Let me know how her symptoms are also and will go from there.

## 2019-05-20 NOTE — TELEPHONE ENCOUNTER
Went over results with pt. She states that she is still getting up 3-4 times during the night. She is still having frequency during the day. She will come Fri to get labs.

## 2019-05-22 ENCOUNTER — HOSPITAL ENCOUNTER (OUTPATIENT)
Dept: SLEEP MEDICINE | Age: 59
Discharge: HOME | End: 2019-05-22
Payer: COMMERCIAL

## 2019-05-22 VITALS
BODY MASS INDEX: 25.92 KG/M2 | WEIGHT: 161.3 LBS | HEIGHT: 66 IN | SYSTOLIC BLOOD PRESSURE: 107 MMHG | OXYGEN SATURATION: 98 % | DIASTOLIC BLOOD PRESSURE: 73 MMHG | HEART RATE: 77 BPM

## 2019-05-22 PROCEDURE — 95810 POLYSOM 6/> YRS 4/> PARAM: CPT | Performed by: INTERNAL MEDICINE

## 2019-05-22 NOTE — TELEPHONE ENCOUNTER
[facility] for return call. Will go over Dr Sulema Mendez rec. Will give her Dr Chad Lepe # [phone] if she hasn't seen urology before. Let pt know of Dr Sulema Mendez rec. She hasn't seen urology. She will call to schedule appt. She will let me know who and when so I can fax info.

## 2019-05-22 NOTE — TELEPHONE ENCOUNTER
Then her symptoms are likely either related to overactive bladder or interstitial cystitis. We can refer her to urology for further evaluation and they can do bladder testing then offer some treatments to help correct the issue.  Refer to [facility] if she has never seen anyone before

## 2019-05-28 ENCOUNTER — TELEPHONE (OUTPATIENT)
Dept: SLEEP MEDICINE | Age: 59
End: 2019-05-28

## 2019-05-29 ENCOUNTER — DOCUMENTATION ONLY (OUTPATIENT)
Dept: SLEEP MEDICINE | Age: 59
End: 2019-05-29

## 2019-05-29 NOTE — PROGRESS NOTES
This note is being routed to  Kathryn Molina MD  Sleep Medicine consult note and sleep study report in patient's chart for review.      Thank you for the referral.

## 2019-05-29 NOTE — TELEPHONE ENCOUNTER
Hadleymei Albert is to be contacted by lead sleep technologist regarding results of Sleep Testing which was indicative of an average AHI of 0.2 per hour with an SpO2 fercho of 93% and SpO2 of < 88% being 0 minutes. Repeat testing is to be considered if symptoms persist or worsen over time.

## 2019-05-31 DIAGNOSIS — R31.29 MICROHEMATURIA: ICD-10-CM

## 2019-06-01 LAB
APPEARANCE UR: CLEAR
BILIRUB UR QL STRIP: NEGATIVE
COLOR UR: YELLOW
GLUCOSE UR QL: NEGATIVE
HGB UR QL STRIP: NEGATIVE
KETONES UR QL STRIP: NEGATIVE
LEUKOCYTE ESTERASE UR QL STRIP: NEGATIVE
MICRO URNS: NORMAL
NITRITE UR QL STRIP: NEGATIVE
PH UR STRIP: 6.5 [PH] (ref 5–7.5)
PROT UR QL STRIP: NEGATIVE
SP GR UR: 1.02 (ref 1–1.03)
UROBILINOGEN UR STRIP-MCNC: 0.2 MG/DL (ref 0.2–1)

## 2019-06-02 ENCOUNTER — TELEPHONE (OUTPATIENT)
Dept: FAMILY MEDICINE CLINIC | Age: 59
End: 2019-06-02

## 2019-06-02 NOTE — TELEPHONE ENCOUNTER
Repeat urine negative/normal.  Also the microscopic blood has cleared up. Has she made appt w/ Urology yet?

## 2019-06-08 ENCOUNTER — TELEPHONE (OUTPATIENT)
Dept: SLEEP MEDICINE | Age: 59
End: 2019-06-08

## 2019-07-30 ENCOUNTER — TELEPHONE (OUTPATIENT)
Dept: FAMILY MEDICINE CLINIC | Age: 59
End: 2019-07-30

## 2019-07-30 NOTE — TELEPHONE ENCOUNTER
Pt. is calling requesting to have a referral to go to a urology doctor. Pt. Stated she had talked about it with dr. Austin Diamond.      Best call #[phone]

## 2019-07-30 NOTE — TELEPHONE ENCOUNTER
Called pt and she had called Dr Allyn Lucio office thinking that it was for an urologist, but it was neurologist.  Pt said that she is confused. Explained that Dr Allyn Lucio ws recommended b/c of h/a's. Pt said that she is getting tx'd by dentist with mouth guard and doing better with that. I gave her the name of Dr Robert Mcdaniel and the #. She will call to schedule. She is scheduled for her 8/16 appt for chol f/u. She will come in a week before to get labs.

## 2019-07-31 NOTE — TELEPHONE ENCOUNTER
Yes it was just for lipids. I had already pended that order back in May and it is still sitting there in the system.

## 2019-08-05 ENCOUNTER — OFFICE VISIT (OUTPATIENT)
Dept: FAMILY MEDICINE CLINIC | Age: 59
End: 2019-08-05

## 2019-08-05 VITALS
BODY MASS INDEX: 26.13 KG/M2 | HEART RATE: 73 BPM | WEIGHT: 162.6 LBS | DIASTOLIC BLOOD PRESSURE: 66 MMHG | OXYGEN SATURATION: 99 % | HEIGHT: 66 IN | SYSTOLIC BLOOD PRESSURE: 92 MMHG | TEMPERATURE: 98.2 F | RESPIRATION RATE: 18 BRPM

## 2019-08-05 DIAGNOSIS — R10.2 PELVIC PRESSURE IN FEMALE: ICD-10-CM

## 2019-08-05 DIAGNOSIS — Z87.440 HISTORY OF RECURRENT UTIS: ICD-10-CM

## 2019-08-05 DIAGNOSIS — R35.0 URINARY FREQUENCY: Primary | ICD-10-CM

## 2019-08-05 LAB
BILIRUB UR QL STRIP: NEGATIVE
GLUCOSE UR-MCNC: NEGATIVE MG/DL
KETONES P FAST UR STRIP-MCNC: NEGATIVE MG/DL
PH UR STRIP: 7 [PH] (ref 4.6–8)
PROT UR QL STRIP: NEGATIVE
SP GR UR STRIP: 1.01 (ref 1–1.03)
UA UROBILINOGEN AMB POC: NORMAL (ref 0.2–1)
URINALYSIS CLARITY POC: CLEAR
URINALYSIS COLOR POC: YELLOW
URINE BLOOD POC: NORMAL
URINE LEUKOCYTES POC: NEGATIVE
URINE NITRITES POC: NEGATIVE

## 2019-08-05 NOTE — PROGRESS NOTES
Chief Complaint   Patient presents with    Urinary Frequency     x 6 days     Fatigue     x 6 days      1. Have you been to the ER, urgent care clinic since your last visit? Hospitalized since your last visit? No    2. Have you seen or consulted any other health care providers outside of the [address] [place] [address] since your last visit? Include any pap smears or colon screening.  No

## 2019-08-05 NOTE — PROGRESS NOTES
[address] Note      Subjective:     Chief Complaint   Patient presents with    Urinary Frequency     x 6 days     Fatigue     x 6 days      Kerline Gardiner is a 62y.o. year old female who presents for evaluation of the following:       Urinary Frequency:  Took a take home test otc that was positive  Urinates 3 times at night and multiple times during the day  Water Intake: \"I drink a lot\"  Had single episode of incontinences last week after holding it for 5 minutes  Has had vaginal delivery  Endorses pelvic pressure  Denies hematuria, fever, dysuria       Review of Systems   Pertinent positives and negative per HPI. All other systems  reviewed are negative for a Comprehensive ROS (10+).        Past Medical History:   Diagnosis Date    Allergic reaction to nickel 7/20/2011    Anxiety 7/20/2011 1997; Psych Dr Narinder Arteaga (retired 2016), changed to new psychiatrist Dr. Laisha Schuler since spring 2018    Chronic insomnia 4/16/2015    Colon polyp 5/19/2010    Controlled substance agreement signed 5/4/2017 5-2017    Dependent Edema of BLE 11/3/2017    Depression 5/19/2010 1997; Psych Dr Narinder Arteaga (retired 2016), changed to new psychiatrist Dr. Laisha Schuler since spring 2018    Family history of breast cancer 5/19/2010    Family history of diabetes mellitus (DM) 5/19/2010    H/O Post partum depression 7/20/2011    Hypercholesterolemia 5/4/2017    Low back pain 2/1/2017    Xrays 5-2016, normal, did PT    Menopausal symptoms 4/3/2014    Gyn Dr Wilson December, pt declined HRT    Migraine 5/19/2010    Negative Screening for IHSS, +FHx 5/19/2010    Panic disorder 5/19/2010    Psych Dr Delores Maria (retired 2016); 1997; Psych Dr Narinder Arteaga (retired 2016), changed to new psychiatrist Dr. Laisha Schuler since spring 2018    Postmenopause, LMP 48, No HRT 4/16/2015    Psoriasis     Recurrent cystitis 4/28/2016    Screening exams for skin cancer 5/4/2017    Per Derm Dr Ramon Epp Vitamin D deficiency 7/25/2011        Social History     Socioeconomic History    Marital status:      Spouse name: Not on file    Number of children: 1    Years of education: Not on file    Highest education level: Not on file   Occupational History    Occupation: Dental Hygenist, part time, 2 x a week   Social Needs    Financial resource strain: Not on file    Food insecurity:     Worry: Not on file     Inability: Not on file   ENDOGENX needs:     Medical: Not on file     Non-medical: Not on file   Tobacco Use    Smoking status: Never Smoker    Smokeless tobacco: Never Used   Substance and Sexual Activity    Alcohol use: No     Comment: none since 2-2016; usually very occ small glass of wine on special occasions now the past year, prior to that she had 1-2 glasses in the evenings a few x a week    Drug use: No    Sexual activity: Yes     Partners: Male   Lifestyle    Physical activity:     Days per week: Not on file     Minutes per session: Not on file    Stress: Not on file   Relationships    Social connections:     Talks on phone: Not on file     Gets together: Not on file     Attends Denominational service: Not on file     Active member of club or organization: Not on file     Attends meetings of clubs or organizations: Not on file     Relationship status: Not on file    Intimate partner violence:     Fear of current or ex partner: Not on file     Emotionally abused: Not on file     Physically abused: Not on file     Forced sexual activity: Not on file   Other Topics Concern     Service Not Asked    Blood Transfusions Not Asked    Caffeine Concern No     Comment: 1 small cup of coffee a day    Occupational Exposure Not Asked   [employer] Bloch Hazards Not Asked    Sleep Concern Not Asked    Stress Concern Not Asked    Weight Concern No     Comment: stable over time in the 160's    Special Diet No    Back Care Not Asked    Exercise Yes     Comment: walks 2 miles several days a week    Bike Helmet Not Asked   [place] Not Asked    Self-Exams Not Asked   Social History Narrative    Not on file       Family History   Problem Relation Age of Onset    Breast Cancer Mother 46    Cancer Mother          of lung cancer, 59; smoker    Cancer Father          of lung cancer, 76; smoker    Diabetes Father     Heart Disease Brother          IHSS age 30    Heart Attack Brother     Hypertension Brother     Hypertension Brother     High Cholesterol Brother     Breast Cancer Maternal Grandmother 48    Heart Disease Maternal Grandmother 61    Diabetes Paternal Grandmother     Heart Disease Paternal Grandmother          CHF 76    Stroke Paternal Grandfather 79    Breast Cancer Maternal Aunt 36    Diabetes Paternal Aunt     Diabetes Paternal Uncle     Other Son         Asperger's Syndrome       Current Outpatient Medications   Medication Sig    ALPRAZolam (XANAX) 0.25 mg tablet TAKE 1 TO 2 TABLETS BY MOUTH AT NIGHT AS NEEDED FOR ANXIETY (Patient taking differently: Take 0.25 mg by mouth nightly as needed. TAKE 1 TO 2 TABLETS BY MOUTH AT NIGHT AS NEEDED FOR ANXIETY)    cholecalciferol, vitamin D3, (VITAMIN D3) 2,000 unit tab Take  by mouth daily. Restarted 2019 daily    ondansetron (ZOFRAN ODT) 8 mg disintegrating tablet Take 1 Tab by mouth every eight (8) hours as needed for Nausea. No current facility-administered medications for this visit. Objective:     Vitals:    19 0827 19 0832   BP: 97/66 92/66   Pulse: 73    Resp: 18    Temp: 98.2 °F (36.8 °C)    TempSrc: Oral    SpO2: 99%    Weight: 162 lb 9.6 oz (73.8 kg)    Height: 5' 5.5\" (1.664 m)        Physical Examination:  General: Alert, cooperative, no distress, appears stated age. Eyes: Conjunctivae clear. PERRL, EOMs intact. Ears: Normal external ear canals both ears. Nose: Nares normal. Septum midline. Mucosa normal. No drainage or sinus tenderness.   Mouth/Throat: Lips, mucosa, and tongue normal. No oropharyngeal erythema. No tonsillar enlargement or exudate. Neck: Supple, symmetrical, trachea midline, no adenopathy. No thyroid enlargement/tenderness/nodules  Respiratory: Breathing comfortably, in no acute respiratory distress. Clear to auscultation bilaterally. Normal inspiratory and expiratory ratio. Cardiovascular: Regular rate and rhythm, S1, S2 normal, no murmur, click, rub or gallop.   -Extremities no edema. Pulses 2+ and symmetric radial   Abdomen: Soft, non-tender, not distended. Bowel sounds normal. No masses or organomegaly. MSK: Extremities normal, atraumatic, no effusion. Gait steady and unassisted. Back symmetric, no curvature. ROM normal. No CVA tenderness. Skin: Skin color, texture, turgor normal. No rashes or lesions on exposed skin. Lymph nodes: Cervical, supraclavicular nodes normal.  Neurologic: CNII-XII intact. Strength 5/5 grossly. Sensation and reflexes normal throughout. Psychiatric: Affect appropriate. Mood euthymic.  Thoughts logical. Speech volume and speed normal      Office Visit on 08/05/2019   Component Date Value Ref Range Status    Color (UA POC) 08/05/2019 Yellow   Final    Clarity (UA POC) 08/05/2019 Clear   Final    Glucose (UA POC) 08/05/2019 Negative  Negative Final    Bilirubin (UA POC) 08/05/2019 Negative  Negative Final    Ketones (UA POC) 08/05/2019 Negative  Negative Final    Specific gravity (UA POC) 08/05/2019 1.015  1.001 - 1.035 Final    Blood (UA POC) 08/05/2019 Trace  Negative Final    pH (UA POC) 08/05/2019 7.0  4.6 - 8.0 Final    Protein (UA POC) 08/05/2019 Negative  Negative Final    Urobilinogen (UA POC) 08/05/2019 0.2 mg/dL  0.2 - 1 Final    Nitrites (UA POC) 08/05/2019 Negative  Negative Final    Leukocyte esterase (UA POC) 08/05/2019 Negative  Negative Final   Orders Only on 05/31/2019   Component Date Value Ref Range Status    Specific Gravity 05/31/2019 1.021  1.005 - 1.030 Final    pH (UA) 05/31/2019 6.5  5.0 - 7.5 Final    Color 05/31/2019 Yellow  Yellow Final    Appearance 05/31/2019 Clear  Clear Final    Leukocyte Esterase 05/31/2019 Negative  Negative Final    Protein 05/31/2019 Negative  Negative/Trace Final    Glucose 05/31/2019 Negative  Negative Final    Ketone 05/31/2019 Negative  Negative Final    Blood 05/31/2019 Negative  Negative Final    Bilirubin 05/31/2019 Negative  Negative Final    Urobilinogen 05/31/2019 0.2  0.2 - 1.0 mg/dL Final    Nitrites 05/31/2019 Negative  Negative Final    Microscopic Examination 05/31/2019 Comment   Final    Microscopic not indicated and not performed.    Office Visit on 05/17/2019   Component Date Value Ref Range Status    Color (UA POC) 05/17/2019 Yellow   Final    Clarity (UA POC) 05/17/2019 Clear   Final    Glucose (UA POC) 05/17/2019 Negative  Negative Final    Bilirubin (UA POC) 05/17/2019 Negative  Negative Final    Ketones (UA POC) 05/17/2019 Negative  Negative Final    Specific gravity (UA POC) 05/17/2019 1.015  1.001 - 1.035 Final    Blood (UA POC) 05/17/2019 1+  Negative Final    pH (UA POC) 05/17/2019 5.5  4.6 - 8.0 Final    Protein (UA POC) 05/17/2019 Negative  Negative Final    Urobilinogen (UA POC) 05/17/2019 0.2 mg/dL  0.2 - 1 Final    Nitrites (UA POC) 05/17/2019 Negative  Negative Final    Leukocyte esterase (UA POC) 05/17/2019 1+  Negative Final    Urine Culture, Routine 05/17/2019    Final                    Value:Mixed urogenital gurwinder  Less than 10,000 colonies/mL     Hospital Outpatient Visit on 05/10/2019   Component Date Value Ref Range Status    Right cca dist sys 05/10/2019 63.1  cm/s Final    Right CCA dist penaloza 05/10/2019 20.3  cm/s Final    Right CCA prox sys 05/10/2019 68.3  cm/s Final    Right CCA prox penaloza 05/10/2019 16.0  cm/s Final    Right eca sys 05/10/2019 48.6  cm/s Final    RIGHT EXTERNAL CAROTID ARTERY D 05/10/2019 8.04  cm/s Final    Right ICA dist sys 05/10/2019 70.3  cm/s Final    Right ICA dist penaloza 05/10/2019 31.7  cm/s Final    Right ICA mid sys 05/10/2019 83.1  cm/s Final    Right ICA mid penaloza 05/10/2019 40.4  cm/s Final    Right ICA prox sys 05/10/2019 60.7  cm/s Final    Right ICA prox penaloza 05/10/2019 29.4  cm/s Final    Right vertebral sys 05/10/2019 28.6  cm/s Final    RIGHT VERTEBRAL ARTERY D 05/10/2019 9.32  cm/s Final    Right ICA/CCA sys 05/10/2019 1.3   Final    Left CCA dist sys 05/10/2019 49.3  cm/s Final    Left CCA dist penaloza 05/10/2019 15.9  cm/s Final    Left CCA prox sys 05/10/2019 82.3  cm/s Final    Left CCA prox penaloza 05/10/2019 25.6  cm/s Final    Left ECA sys 05/10/2019 49.4  cm/s Final    LEFT EXTERNAL CAROTID ARTERY D 05/10/2019 7.50  cm/s Final    Left ICA dist sys 05/10/2019 61.6  cm/s Final    Left ICA dist penaloza 05/10/2019 28.4  cm/s Final    Left ICA mid sys 05/10/2019 51.1  cm/s Final    Left ICA mid penaloza 05/10/2019 27.6  cm/s Final    Left ICA prox sys 05/10/2019 43.1  cm/s Final    Left ICA prox penaloza 05/10/2019 20.6  cm/s Final    Left vertebral sys 05/10/2019 36.0  cm/s Final    LEFT VERTEBRAL ARTERY D 05/10/2019 13.95  cm/s Final    Left ICA/CCA sys 05/10/2019 1.25   Final   Orders Only on 05/10/2019   Component Date Value Ref Range Status    VITAMIN D, 25-HYDROXY 05/10/2019 32.6  30.0 - 100.0 ng/mL Final    Comment: Vitamin D deficiency has been defined by the [place] of  Medicine and an Endocrine Society practice guideline as a  level of serum 25-OH vitamin D less than 20 ng/mL (1,2). The Endocrine Society went on to further define vitamin D  insufficiency as a level between 21 and 29 ng/mL (2). 1. IOM ([place] of Medicine). 2010. Dietary reference     intakes for calcium and D. [facility]: The     [employer]. 2. [place] MF, Jamar BAEZ, Rhonda WEAVER, et al.     Evaluation, treatment, and prevention of vitamin D     deficiency: an Endocrine Society clinical practice     guideline. JCEM.  2011 Jul; 96(7):1911-30.  TSH 05/10/2019 1.370  0.450 - 4.500 uIU/mL Final    WBC 05/10/2019 3.9  3.4 - 10.8 x10E3/uL Final    RBC 05/10/2019 4.40  3.77 - 5.28 x10E6/uL Final    HGB 05/10/2019 14.3  11.1 - 15.9 g/dL Final    HCT 05/10/2019 42.2  34.0 - 46.6 % Final    MCV 05/10/2019 96  79 - 97 fL Final    MCH 05/10/2019 32.5  26.6 - 33.0 pg Final    MCHC 05/10/2019 33.9  31.5 - 35.7 g/dL Final    RDW 05/10/2019 13.2  12.3 - 15.4 % Final    PLATELET 11/45/7029 693  150 - 379 x10E3/uL Final    Cholesterol, total 05/10/2019 248* 100 - 199 mg/dL Final    Triglyceride 05/10/2019 56  0 - 149 mg/dL Final    HDL Cholesterol 05/10/2019 74  >39 mg/dL Final    VLDL, calculated 05/10/2019 11  5 - 40 mg/dL Final    LDL, calculated 05/10/2019 163* 0 - 99 mg/dL Final    Glucose 05/10/2019 97  65 - 99 mg/dL Final    BUN 05/10/2019 13  6 - 24 mg/dL Final    Creatinine 05/10/2019 0.91  0.57 - 1.00 mg/dL Final    GFR est non-AA 05/10/2019 70  >59 mL/min/1.73 Final    GFR est AA 05/10/2019 80  >59 mL/min/1.73 Final    BUN/Creatinine ratio 05/10/2019 14  9 - 23 Final    Sodium 05/10/2019 142  134 - 144 mmol/L Final    Potassium 05/10/2019 4.9  3.5 - 5.2 mmol/L Final    Chloride 05/10/2019 104  96 - 106 mmol/L Final    CO2 05/10/2019 23  20 - 29 mmol/L Final    Calcium 05/10/2019 9.7  8.7 - 10.2 mg/dL Final    Protein, total 05/10/2019 7.3  6.0 - 8.5 g/dL Final    Albumin 05/10/2019 4.8  3.5 - 5.5 g/dL Final    GLOBULIN, TOTAL 05/10/2019 2.5  1.5 - 4.5 g/dL Final    A-G Ratio 05/10/2019 1.9  1.2 - 2.2 Final    Bilirubin, total 05/10/2019 0.3  0.0 - 1.2 mg/dL Final    Alk. phosphatase 05/10/2019 61  39 - 117 IU/L Final    AST (SGOT) 05/10/2019 17  0 - 40 IU/L Final    ALT (SGPT) 05/10/2019 23  0 - 32 IU/L Final    INTERPRETATION 05/10/2019 Note   Final    Supplemental report is available. Assessment/ Plan:   Diagnoses and all orders for this visit:    1.  Urinary frequency  -     AMB POC URINALYSIS DIP STICK AUTO W/O MICRO  -     CULTURE, URINE    2. History of recurrent UTIs    3. Pelvic pressure in female      Suspect pelvic organ prolapse leading to urinary frequency/ urinary urgency. POC urine testing negtative for infection. Culture to confirm. Follow up with urologist as planned. Advised patient have recent OB/ GYN pelvic exam results send to PCP or RTC for pelvic exam.       Educated patient on red flag symptoms to warrant return to clinic or emergency room visit. I have discussed the diagnosis with the patient and the intended plan as seen in the above orders. The patient has been offered or received an after-visit summary and questions were answered concerning future plans. I have discussed medication side effects and warnings with the patient as well. Follow-up and Dispositions    · Return if symptoms worsen or fail to improve.        Signed,    Andrzej Coker MD  8/5/2019

## 2019-08-07 LAB — BACTERIA UR CULT: NORMAL

## 2019-09-12 ENCOUNTER — OFFICE VISIT (OUTPATIENT)
Dept: FAMILY MEDICINE CLINIC | Age: 59
End: 2019-09-12

## 2019-09-12 VITALS
OXYGEN SATURATION: 98 % | DIASTOLIC BLOOD PRESSURE: 70 MMHG | RESPIRATION RATE: 16 BRPM | TEMPERATURE: 97.8 F | WEIGHT: 163.2 LBS | SYSTOLIC BLOOD PRESSURE: 110 MMHG | HEART RATE: 81 BPM | HEIGHT: 66 IN | BODY MASS INDEX: 26.23 KG/M2

## 2019-09-12 DIAGNOSIS — R30.0 DYSURIA: Primary | ICD-10-CM

## 2019-09-12 DIAGNOSIS — Z87.440 HISTORY OF RECURRENT UTIS: ICD-10-CM

## 2019-09-12 DIAGNOSIS — R31.0 GROSS HEMATURIA: ICD-10-CM

## 2019-09-12 DIAGNOSIS — N30.01 ACUTE CYSTITIS WITH HEMATURIA: ICD-10-CM

## 2019-09-12 PROBLEM — N95.2 ATROPHIC VAGINITIS: Status: ACTIVE | Noted: 2019-09-12

## 2019-09-12 LAB
BILIRUB UR QL STRIP: NEGATIVE
GLUCOSE UR-MCNC: NEGATIVE MG/DL
KETONES P FAST UR STRIP-MCNC: NEGATIVE MG/DL
PH UR STRIP: 7.5 [PH] (ref 4.6–8)
PROT UR QL STRIP: NEGATIVE
SP GR UR STRIP: 1.01 (ref 1–1.03)
UA UROBILINOGEN AMB POC: NORMAL (ref 0.2–1)
URINALYSIS CLARITY POC: CLEAR
URINALYSIS COLOR POC: YELLOW
URINE BLOOD POC: NORMAL
URINE LEUKOCYTES POC: NORMAL
URINE NITRITES POC: NEGATIVE

## 2019-09-12 RX ORDER — PHENAZOPYRIDINE HYDROCHLORIDE 200 MG/1
200 TABLET, FILM COATED ORAL
Qty: 9 TAB | Refills: 0 | Status: SHIPPED | OUTPATIENT
Start: 2019-09-12 | End: 2019-09-15

## 2019-09-12 RX ORDER — CIPROFLOXACIN 500 MG/1
500 TABLET ORAL 2 TIMES DAILY
Qty: 10 TAB | Refills: 0 | Status: SHIPPED | OUTPATIENT
Start: 2019-09-12 | End: 2019-09-17

## 2019-09-12 NOTE — PROGRESS NOTES
Chief Complaint   Patient presents with    Blood in Urine     x 2 days     Urinary Burning     x 2 days     Urinary Frequency     x 2 days      HISTORY OF PRESENT ILLNESS   HPI  UTI NOTE  Duration or Onset of Symptoms: 2-3 days    (+) Symptoms: Dysuria, burning, bladder spasms, urgency, frequency, nocturia, hematuria, nausea, chills    (-) Symptoms: Abdominal or flank pain, vomiting, fever, discharge    Other ROS or Pertinent History: Saw Gyn several weeks ago for vaginal dryness, vaginal burning, pelvic pressure. Diagnosed w/ atrophy. Prescribed Imvexxy Estrogen Vaginal Tablets but she has not started this yet, leery of anything estrogen based. Home Remedies Tried: Azo yesterday helped some, heating pad prn helps, increased water and cranberry juice since yesterday, urine appears more clear today w/o being overtly bloody this AM    History of Recurrent UTI's: Yes  Recent UTI History: Saw Dr. Reta Armendariz 8-5 for similar symptoms but w/o the blood or chills, urine culture was negative, no abx treatment at that time. History of Pyelonephritis: Denies  History of  Procedures or Previous Urologic Evaluation: Saw Urologist for evaluation about 3-4 weeks ago, had urine, cystoscopy and bladder scan done, all reportedly normal. Referred for bladder PT. Patient declined for now. Caffeine Usage: 1 cup of coffee a day  Tobacco Use: None    OB History        1    Para   1    Term                AB        Living           SAB        TAB        Ectopic        Molar        Multiple        Live Births              Obstetric Comments   Menarche:  15. LMP: 53.  # of Children:  1. Age at Delivery of First Child:  39.   Hysterectomy/oophorectomy:  NO/NO. Breast Bx:  no.  Hx of Breast Feeding:  yes. BCP:  no. Hormone therapy:  No     x 1, s/p IVF in ; Gyn Dr Ramone [facility]   Constitutional: Positive for chills. Negative for fever.    Gastrointestinal: Positive for nausea. Negative for abdominal pain and vomiting. Genitourinary: Positive for dysuria, frequency and urgency. Negative for flank pain.            PROBLEM LIST/MEDICAL HISTORY     Problem List  Date Reviewed: 9/12/2019          Codes Class Noted    History of recurrent UTIs ICD-10-CM: Z87.440  ICD-9-CM: V13.02  9/12/2019    Overview Signed 9/12/2019  8:46 AM by Weston Greene MD     Urology eval ~ 8-2019: Cystoscopy and Bladder Scan, Negative, referred for Bladder PT, pt declined             Atrophic vaginitis ICD-10-CM: N95.2  ICD-9-CM: 627.3  9/12/2019    Overview Signed 9/12/2019  8:52 AM by Weston Greene MD     2019 Gyn Dr Norah Tenorio             Dependent Edema of BLE ICD-10-CM: R60.9  ICD-9-CM: 782.3  11/3/2017        Controlled substance agreement signed ICD-10-CM: Z79.899  ICD-9-CM: V58.69  5/4/2017    Overview Addendum 3/13/2018  3:14 PM by Weston Greene MD     [id number], 3-2018             Hypercholesterolemia ICD-10-CM: E78.00  ICD-9-CM: 272.0  5/4/2017        Screening exams for skin cancer ICD-10-CM: Z12.83  ICD-9-CM: V76.43  5/4/2017    Overview Signed 5/4/2017  1:07 PM by Weston Greene MD     Per Derm Dr Ulices Rousseau             Low back pain ICD-10-CM: M54.5  ICD-9-CM: 724.2  2/1/2017    Overview Signed 2/1/2017  8:33 AM by Weston Greene MD     Xrays 5-2530, normal, did PT             Recurrent cystitis ICD-10-CM: N30.90  ICD-9-CM: 595.9  4/28/2016    Overview Signed 3/13/2018  3:29 PM by Weston Greene MD      2014             Chronic insomnia ICD-10-CM: F51.04  ICD-9-CM: 780.52  4/16/2015    Overview Addendum 4/28/2016 11:45 AM by Weston Greene MD     Psych rx'd Vandana Eubanks worked really well, but insurance stopped covering             Postmenopause, LMP 48, No HRT ICD-10-CM: Z78.0  ICD-9-CM: V49.81  4/16/2015        Menopausal symptoms ICD-10-CM: N95.1  ICD-9-CM: 627.2  4/3/2014    Overview Signed 4/3/2014 12:01 PM by Weston Greene Dc Mendez MD     Gyn Dr Armen Urena, pt declined HRT             Vitamin D deficiency ICD-10-CM: E55.9  ICD-9-CM: 268.9  7/25/2011    Overview Signed 7/25/2011 10:54 PM by Mick Braga MD     7/2011             Anxiety ICD-10-CM: F41.9  ICD-9-CM: 300.00  7/20/2011    Overview Signed 5/17/2019  9:36 AM by Mcik Braga MD     1997; Psych Dr Conrad Whalen (retired 2016), changed to new psychiatrist Dr. Luis Alfredo Thomas since spring 2018             H/O Post partum depression ICD-10-CM: O99.345, F53.0  ICD-9-CM: 648.44, 311  7/20/2011    Overview Signed 7/20/2011  9:14 AM by MD Christal Mcleod             Psoriasis ICD-10-CM: L40.9  ICD-9-CM: 696.1  7/20/2011    Overview Signed 7/20/2011  9:18 AM by Mick Braga MD     Derm Dr Mary Mcdonald             Allergic reaction to nickel ICD-10-CM: L23.0  ICD-9-CM: 692.83  7/20/2011        Panic disorder ICD-10-CM: F41.0  ICD-9-CM: 300.01  5/19/2010    Overview Addendum 5/17/2019  9:36 AM by Mick Braga MD     Psych Dr Laquita Garcia (retired 2016); 1997; Psych Dr Conrad Whalen (retired 2016), changed to new psychiatrist Dr. Luis Alfredo Thomas since spring 2018             Depression ICD-10-CM: F32.9  ICD-9-CM: 669  5/19/2010    Overview Addendum 5/17/2019  9:35 AM by Mick Braga MD     1997; Psych Dr Conrad Whalen (retired 2016), changed to new psychiatrist Dr. Luis Alfredo Thomas since spring 2018             Colon polyp ICD-10-CM: K63.5  ICD-9-CM: 211.3  5/19/2010    Overview Signed 5/19/2010 10:06 AM by Mary Lou Holden     Benign              Family history of breast cancer ICD-10-CM: Z80.3  ICD-9-CM: V16.3  5/19/2010        Family history of diabetes mellitus (DM) ICD-10-CM: Z83.3  ICD-9-CM: V18.0  5/19/2010        Migraine ICD-10-CM: I60.602  ICD-9-CM: 346.90  5/19/2010    Overview Addendum 4/3/2014 11:40 AM by Mick Braga MD     4/04;  Acute migraine w/ neuro changes summer 2013, [facility], CT scan negative Screening for cardiovascular condition ICD-10-CM: Z13.6  ICD-9-CM: V81.2  5/19/2010    Overview Signed 5/19/2010 10:09 AM by Rusty Diamonding     For IHSS (negative)                       PAST SURGICAL HISTORY     Past Surgical History:   Procedure Laterality Date    BIOPSY CERVIX  2003    negative    COLONOSCOPY  2002    benign polyp; Dr Bhavana Tariq    successful    HX COLONOSCOPY  5/2005    normal, f/u 10 yrs, Dr Chante Bustamante HX COLONOSCOPY  03/09/2018    polypectomy x 2, repeat 5 yrs, Dr. Bill Montelongo  2009    negative    HX GI  2001    Nissen Fundoplication for severe reflux    HX HEMORRHOIDECTOMY  3/2002    HX MALIGNANT SKIN LESION EXCISION  1994    BCC excised right chest    XR CHEST PA LAT  10/2008    normal         MEDICATIONS     Current Outpatient Medications   Medication Sig    ALPRAZolam (XANAX) 0.25 mg tablet TAKE 1 TO 2 TABLETS BY MOUTH AT NIGHT AS NEEDED FOR ANXIETY (Patient taking differently: Take 0.25 mg by mouth nightly as needed. TAKE 1 TO 2 TABLETS BY MOUTH AT NIGHT AS NEEDED FOR ANXIETY)    cholecalciferol, vitamin D3, (VITAMIN D3) 2,000 unit tab Take  by mouth daily. Restarted 2-2019 daily     No current facility-administered medications for this visit.            ALLERGIES     Allergies   Allergen Reactions    Latex Other (comments)    Adhesive Rash    Ambien [Zolpidem] Other (comments)     Didn't like, vivid dreams    Demerol [Meperidine] Other (comments)     GI    Morphine Other (comments)     Chest pain    Other Medication Other (comments)     Nickel          SOCIAL HISTORY     Social History     Socioeconomic History    Marital status:      Spouse name: Not on file    Number of children: 1    Years of education: Not on file    Highest education level: Not on file   Occupational History    Occupation: Dental Hygenist, part time, 2 x a week   Tobacco Use    Smoking status: Never Smoker    Smokeless tobacco: Never Used Substance and Sexual Activity    Alcohol use: No     Comment: none since ; usually very occ small glass of wine on special occasions now the past year, prior to that she had 1-2 glasses in the evenings a few x a week    Drug use: No    Sexual activity: Yes     Partners: Male   Other Topics Concern    Caffeine Concern No     Comment: 1 small cup of coffee a day    Weight Concern No     Comment: stable over time in the 160's    Special Diet No    Exercise Yes     Comment: walks 2 miles several days a week        IMMUNIZATIONS  Immunization History   Administered Date(s) Administered    Influenza Nasal Vaccine 2014    Influenza Vaccine 2018    Influenza Vaccine (Quad) PF 2018    Tdap 2015         FAMILY HISTORY     Family History   Problem Relation Age of Onset    Breast Cancer Mother 46    Cancer Mother          of lung cancer, 59; smoker    Cancer Father          of lung cancer, 76; smoker    Diabetes Father     Heart Disease Brother          IHSS age 32   24 [facility] [place] Heart Attack Brother     Hypertension Brother     Hypertension Brother     High Cholesterol Brother     Breast Cancer Maternal Grandmother 48    Heart Disease Maternal Grandmother 61    Diabetes Paternal Grandmother     Heart Disease Paternal Grandmother          CHF 76    Stroke Paternal Grandfather 79    Breast Cancer Maternal Aunt 36    Diabetes Paternal Aunt     Diabetes Paternal Uncle     Other Son         Asperger's Syndrome         VITALS     Visit Vitals  /70 (BP 1 Location: Left arm, BP Patient Position: Sitting)   Pulse 81   Temp 97.8 °F (36.6 °C) (Oral)   Resp 16   Ht 5' 5.5\" (1.664 m)   Wt 163 lb 3.2 oz (74 kg)   LMP 2011   SpO2 98%   BMI 26.74 kg/m²          PHYSICAL EXAMINATION   Physical Exam   Cardiovascular: Normal rate and regular rhythm. Pulmonary/Chest: Effort normal.   Abdominal: Soft. She exhibits no distension and no mass.  There is tenderness in the suprapubic area. There is no CVA tenderness. Vitals reviewed. DIAGNOSTIC DATA         LABORATORY DATA     Results for orders placed or performed in visit on 09/12/19   AMB POC URINALYSIS DIP STICK AUTO W/O MICRO   Result Value Ref Range    Color (UA POC) Yellow     Clarity (UA POC) Clear     Glucose (UA POC) Negative Negative    Bilirubin (UA POC) Negative Negative    Ketones (UA POC) Negative Negative    Specific gravity (UA POC) 1.015 1.001 - 1.035    Blood (UA POC) 3+ Negative    pH (UA POC) 7.5 4.6 - 8.0    Protein (UA POC) Negative Negative    Urobilinogen (UA POC) 0.2 mg/dL 0.2 - 1    Nitrites (UA POC) Negative Negative    Leukocyte esterase (UA POC) 2+ Negative          ASSESSMENT & PLAN       ICD-10-CM ICD-9-CM    1. Dysuria R30.0 788.1 AMB POC URINALYSIS DIP STICK AUTO W/O MICRO      phenazopyridine (PYRIDIUM) 200 mg tablet   2. Gross hematuria R31.0 599.71    3. Acute cystitis with hematuria N30.01 595.0 CULTURE, URINE      ciprofloxacin HCl (CIPRO) 500 mg tablet   4. History of recurrent UTIs Z87.440 V13.02      Start Cipro 500 mg bid and Pyridium 200 mg tid prn  Urine sent for C&S  Push water intake and continue Cranberry Juice  She has seen her Gyn and Urologist w/in the past several weeks. Gyn Dr. Maurilio Apple: Diagnosed w/ atrophy. Prescribed Imvexxy Estrogen Vaginal Tablets but she has not started this yet, leery of anything estrogen based. Urologist (cant recall who she saw): Urine, cystoscopy and bladder scan done, all reportedly normal. Referred for bladder PT. Patient declined for now. Further recs after C&S results. Recheck urine after treatment. Call back or follow up sooner in the interim if any symptoms worsen or fail to improve along expectant course.

## 2019-09-12 NOTE — PROGRESS NOTES
Chief Complaint   Patient presents with    Blood in Urine     x 2 days     Urinary Burning     x 2 days     Urinary Frequency     x 2 days      1. Have you been to the ER, urgent care clinic since your last visit? Hospitalized since your last visit? No    2. Have you seen or consulted any other health care providers outside of the [address] [place] [address] since your last visit? Include any pap smears or colon screening.  No

## 2019-09-12 NOTE — PATIENT INSTRUCTIONS
Urinary Tract Infection in Women: Care Instructions  Your Care Instructions    A urinary tract infection, or UTI, is a general term for an infection anywhere between the kidneys and the urethra (where urine comes out). Most UTIs are bladder infections. They often cause pain or burning when you urinate. UTIs are caused by bacteria and can be cured with antibiotics. Be sure to complete your treatment so that the infection goes away. Follow-up care is a key part of your treatment and safety. Be sure to make and go to all appointments, and call your doctor if you are having problems. It's also a good idea to know your test results and keep a list of the medicines you take. How can you care for yourself at home? · Take your antibiotics as directed. Do not stop taking them just because you feel better. You need to take the full course of antibiotics. · Drink extra water and other fluids for the next day or two. This may help wash out the bacteria that are causing the infection. (If you have kidney, heart, or liver disease and have to limit fluids, talk with your doctor before you increase your fluid intake.)  · Avoid drinks that are carbonated or have caffeine. They can irritate the bladder. · Urinate often. Try to empty your bladder each time. · To relieve pain, take a hot bath or lay a heating pad set on low over your lower belly or genital area. Never go to sleep with a heating pad in place. To prevent UTIs  · Drink plenty of water each day. This helps you urinate often, which clears bacteria from your system. (If you have kidney, heart, or liver disease and have to limit fluids, talk with your doctor before you increase your fluid intake.)  · Urinate when you need to. · Urinate right after you have sex. · Change sanitary pads often. · Avoid douches, bubble baths, feminine hygiene sprays, and other feminine hygiene products that have deodorants.   · After going to the bathroom, wipe from front to back.  When should you call for help? Call your doctor now or seek immediate medical care if:    · Symptoms such as fever, chills, nausea, or vomiting get worse or appear for the first time.     · You have new pain in your back just below your rib cage. This is called flank pain.     · There is new blood or pus in your urine.     · You have any problems with your antibiotic medicine.    Watch closely for changes in your health, and be sure to contact your doctor if:    · You are not getting better after taking an antibiotic for 2 days.     · Your symptoms go away but then come back. Where can you learn more? Go to http://glenny-macho.info/. Enter Z486 in the search box to learn more about \"Urinary Tract Infection in Women: Care Instructions. \"  Current as of: December 19, 2018  Content Version: 12.1  © 9307-4156 [employer]. Care instructions adapted under license by [employer] (which disclaims liability or warranty for this information). If you have questions about a medical condition or this instruction, always ask your healthcare professional. [address] any warranty or liability for your use of this information.

## 2019-09-13 ENCOUNTER — TELEPHONE (OUTPATIENT)
Dept: FAMILY MEDICINE CLINIC | Age: 59
End: 2019-09-13

## 2019-09-13 DIAGNOSIS — R31.9 HEMATURIA, UNSPECIFIED TYPE: Primary | ICD-10-CM

## 2019-09-13 LAB — BACTERIA UR CULT: NORMAL

## 2019-09-13 NOTE — TELEPHONE ENCOUNTER
Patient is calling stating that she was prescribed ciprofloxacin HCl (CIPRO) 500 mg tablet, pt is having side effects from the medication (flu like symptoms). Pt is requesting for alternative medication. [facility] on file verified      Best callback:  [phone]      LOV:  Thursday, September 12, 2019

## 2019-09-15 NOTE — TELEPHONE ENCOUNTER
What symptoms was she complaining of? I doubt the Cipro is giving her flu like symptoms. However, her final urine culture is back now and is negative for UTI. So she may have something else going on anyway and can go ahead and stop the Cipro. Advise me of symptoms. May need to go to the ER. Please advise me asap. Thanks.

## 2019-09-16 ENCOUNTER — TELEPHONE (OUTPATIENT)
Dept: FAMILY MEDICINE CLINIC | Age: 59
End: 2019-09-16

## 2019-09-16 NOTE — TELEPHONE ENCOUNTER
----- Message from Shasta Lazo sent at 9/14/2019  8:56 AM EDT -----  Regarding: Dr Kayla Rosas first and last name:      Reason for call:  Side effects from Cipro she was given Thursday for a UTI, light headed, diarrhea, tired, prickly feeling in fingers and  headache while she was taking medication. Callback required yes/no and why:  Yes pharmacist told her to stop until she spoke to someone. She doesn't want to drive to come in for appt. Called twice yesterday and no one has called her back yet.    Best contact number(s):  [phone]    Details to clarify the request:      Shasta Lazo

## 2019-09-16 NOTE — TELEPHONE ENCOUNTER
Come in for follow up urine test end of week. Drink lots of water. Are her urinary symptoms resolved (blood, pain, etc)?  Order pended for follow up urine

## 2019-09-16 NOTE — TELEPHONE ENCOUNTER
Called pt ans she states that the only thing that she is still having is some soft formed stool 3 times in the am.  She started taking probiotic on Fri. Her c/o were diarrhea, muscle aches, body aches, buzzy feeling in extremities, lightheaded, h/a, fatigue, cramps and shakes. She had contacted the pharmacy and pharmacist recommended that she stop the cipro b/c of \"all the weird sx's she was having\". Pt is doing a little better now but very concerned why she had blood in her urine. She got an OTC test strip that only shows leukocytes and nitrates and the leukocyte were +. Denies back pain or lower abd pain.

## 2019-09-16 NOTE — TELEPHONE ENCOUNTER
Informed pt of Dr Campbell Jean Baptiste rec. Pt states that she is scheduled for Monday for a f/u appt. She will come in Thursday to get urine and to get labs drawn. That way she can go over both things.

## 2019-09-19 DIAGNOSIS — E78.00 HYPERCHOLESTEROLEMIA: ICD-10-CM

## 2019-09-19 DIAGNOSIS — R31.9 HEMATURIA, UNSPECIFIED TYPE: ICD-10-CM

## 2019-09-20 LAB
APPEARANCE UR: CLEAR
BACTERIA #/AREA URNS HPF: ABNORMAL /[HPF]
BACTERIA UR CULT: NORMAL
BILIRUB UR QL STRIP: NEGATIVE
CASTS URNS QL MICRO: ABNORMAL /LPF
CHOLEST SERPL-MCNC: 229 MG/DL (ref 100–199)
COLOR UR: YELLOW
EPI CELLS #/AREA URNS HPF: ABNORMAL /HPF (ref 0–10)
GLUCOSE UR QL: NEGATIVE
HDLC SERPL-MCNC: 74 MG/DL
HGB UR QL STRIP: ABNORMAL
INTERPRETATION, 910389: NORMAL
KETONES UR QL STRIP: NEGATIVE
LDLC SERPL CALC-MCNC: 141 MG/DL (ref 0–99)
LEUKOCYTE ESTERASE UR QL STRIP: NEGATIVE
MICRO URNS: ABNORMAL
MUCOUS THREADS URNS QL MICRO: PRESENT
NITRITE UR QL STRIP: NEGATIVE
PH UR STRIP: 6.5 [PH] (ref 5–7.5)
PROT UR QL STRIP: NEGATIVE
RBC #/AREA URNS HPF: ABNORMAL /HPF (ref 0–2)
SP GR UR: 1.02 (ref 1–1.03)
TRIGL SERPL-MCNC: 69 MG/DL (ref 0–149)
UROBILINOGEN UR STRIP-MCNC: 0.2 MG/DL (ref 0.2–1)
VLDLC SERPL CALC-MCNC: 14 MG/DL (ref 5–40)
WBC #/AREA URNS HPF: ABNORMAL /HPF (ref 0–5)

## 2019-09-23 ENCOUNTER — OFFICE VISIT (OUTPATIENT)
Dept: FAMILY MEDICINE CLINIC | Age: 59
End: 2019-09-23

## 2019-09-23 VITALS
OXYGEN SATURATION: 99 % | HEART RATE: 79 BPM | DIASTOLIC BLOOD PRESSURE: 64 MMHG | SYSTOLIC BLOOD PRESSURE: 118 MMHG | BODY MASS INDEX: 27.26 KG/M2 | WEIGHT: 163.6 LBS | RESPIRATION RATE: 16 BRPM | HEIGHT: 65 IN | TEMPERATURE: 97.8 F

## 2019-09-23 DIAGNOSIS — R31.0 GROSS HEMATURIA: ICD-10-CM

## 2019-09-23 DIAGNOSIS — E78.00 HYPERCHOLESTEROLEMIA: Primary | ICD-10-CM

## 2019-09-23 NOTE — PROGRESS NOTES
Chief Complaint   Patient presents with    Cholesterol Problem     had fasting labs done 9-19-19    Follow-up     urine results     HISTORY OF PRESENT ILLNESS   HPI  Follow up hypercholesterolemia. Had fasting follow up labs done 9-19. Since last check this summer, she has been eating healthier: cut back on dairy, cut out processed foods, eating oatmeal w/ cinnamon, more green vegetables,  eating overall. Walks several days a week for exercise. She was pleased to see her LDL come down. Follow up urine from 9-12-19 visit. She presented w/ classic symptoms of UTI. She was started on Cipro. She took the full 3 days of it, but it made her feel really bad. She now recalls that when she took it years ago she thinks it made her feel the same way. Never wants to take it again. Urine culture ended up being negative. Since completing it however, she is feeling a bit better. Still some slight urinary discomfort. The back pain is gone. It comes and goes. Saw some blood this weekend. None now. Her follow up urine she did on 9-19 shows persistence of blood and urine culture was negative again. The urgency/frequency/bladder spasms are constant and that is what she saw urologist and gyn for. Gyn Dr. Viera Learn: Diagnosed w/ atrophy. Prescribed Imvexxy Estrogen Vaginal Tablets but she has not started this yet, leery of anything estrogen based. Urologist (cant recall who she saw): Urine, cystoscopy and bladder scan done, all reportedly normal. Referred for bladder PT. She has not started it yet, but decided to go ahead w/ it now. REVIEW OF SYMPTOMS   Review of Systems   Constitutional: Negative for chills and fever. Respiratory: Negative. Cardiovascular: Negative. Gastrointestinal: Negative.             PROBLEM LIST/MEDICAL HISTORY     Problem List  Date Reviewed: 9/23/2019          Codes Class Noted    History of recurrent UTIs ICD-10-CM: Z87.440  ICD-9-CM: V13.02  9/12/2019    Overview Signed 9/12/2019  8:46 AM by Luis Manuel Nation MD     Urology eval ~ [id number]: Cystoscopy and Bladder Scan, Negative, referred for Bladder PT, pt declined             Atrophic vaginitis ICD-10-CM: N95.2  ICD-9-CM: 627.3  9/12/2019    Overview Signed 9/12/2019  8:52 AM by Luis Manuel Nation MD     2019 Gyn Dr Marichuy Clancy             Dependent Edema of BLE ICD-10-CM: R60.9  ICD-9-CM: 782.3  11/3/2017        Controlled substance agreement signed ICD-10-CM: Z79.899  ICD-9-CM: V58.69  5/4/2017    Overview Addendum 3/13/2018  3:14 PM by Luis Manuel Nation MD     [id number], 3-2018             Hypercholesterolemia ICD-10-CM: E78.00  ICD-9-CM: 272.0  5/4/2017        Screening exams for skin cancer ICD-10-CM: Z12.83  ICD-9-CM: V76.43  5/4/2017    Overview Signed 5/4/2017  1:07 PM by Luis Manuel Nation MD     Per Derm Dr Beryle Kirk             Low back pain ICD-10-CM: M54.5  ICD-9-CM: 724.2  2/1/2017    Overview Signed 2/1/2017  8:33 AM by Luis Manuel Nation MD     Xrays [id number], normal, did PT             Recurrent cystitis ICD-10-CM: N30.90  ICD-9-CM: 595.9  4/28/2016    Overview Signed 3/13/2018  3:29 PM by Luis Manuel Nation MD      2014             Chronic insomnia ICD-10-CM: F51.04  ICD-9-CM: 780.52  4/16/2015    Overview Addendum 4/28/2016 11:45 AM by Luis Manuel Nation MD     Psych rx'd Kristel Timmydaiana worked really well, but insurance stopped covering             Postmenopause, LMP 48, No HRT ICD-10-CM: Z78.0  ICD-9-CM: V49.81  4/16/2015        Menopausal symptoms ICD-10-CM: N95.1  ICD-9-CM: 627.2  4/3/2014    Overview Signed 4/3/2014 12:01 PM by Luis Manuel Nation MD     Gyn Dr Marichuy Clancy, pt declined HRT             Vitamin D deficiency ICD-10-CM: E55.9  ICD-9-CM: 268.9  7/25/2011    Overview Signed 7/25/2011 10:54 PM by Luis Manuel Nation MD     7/2011             Anxiety ICD-10-CM: F41.9  ICD-9-CM: 300.00  7/20/2011    Overview Signed 5/17/2019  9:36 AM by Luis Manuel Ntaion MD     1997; Psych Dr Lyndon Corbett (retired 2016), changed to new psychiatrist Dr. Steve Salinas since spring 2018             H/O Post partum depression ICD-10-CM: O99.345, F53.0  ICD-9-CM: 648.44, 311  7/20/2011    Overview Signed 7/20/2011  9:14 AM by Norah Hoffman MD     1997             Psoriasis ICD-10-CM: L40.9  ICD-9-CM: 696.1  7/20/2011    Overview Signed 7/20/2011  9:18 AM by Norah Hoffman MD     Derm Dr Analia Shepard             Allergic reaction to nickel ICD-10-CM: L23.0  ICD-9-CM: 692.83  7/20/2011        Panic disorder ICD-10-CM: F41.0  ICD-9-CM: 300.01  5/19/2010    Overview Addendum 5/17/2019  9:36 AM by Norah Hoffman MD     Psych Dr Denise Porras (retired 2016); 1997; Psych Dr Lyndon Corbett (retired 2016), changed to new psychiatrist Dr. Steve Salinas since spring 2018             Depression ICD-10-CM: F32.9  ICD-9-CM: 444  5/19/2010    Overview Addendum 5/17/2019  9:35 AM by Norah Hoffman MD     1997; Psych Dr Lyndon Corbett (retired 2016), changed to new psychiatrist Dr. Steve Salinas since spring 2018             Colon polyp ICD-10-CM: K63.5  ICD-9-CM: 211.3  5/19/2010    Overview Signed 5/19/2010 10:06 AM by David Hays     Benign              Family history of breast cancer ICD-10-CM: Z80.3  ICD-9-CM: V16.3  5/19/2010        Family history of diabetes mellitus (DM) ICD-10-CM: Z83.3  ICD-9-CM: V18.0  5/19/2010        Migraine ICD-10-CM: Y49.708  ICD-9-CM: 346.90  5/19/2010    Overview Addendum 4/3/2014 11:40 AM by Norah Hoffman MD     4/04;  Acute migraine w/ neuro changes summer 2013, [address] ER, CT scan negative             Screening for cardiovascular condition ICD-10-CM: Z13.6  ICD-9-CM: V81.2  5/19/2010    Overview Signed 5/19/2010 10:09 AM by David Hays     For IHSS (negative)                       PAST SURGICAL HISTORY     Past Surgical History:   Procedure Laterality Date    BIOPSY CERVIX  2003    negative    COLONOSCOPY  2002    benign polyp; Dr Bora Mcnally  FEMALE IVF  1997    successful    HX COLONOSCOPY  5/2005    normal, f/u 10 yrs, Dr Bonnie Hernandez HX COLONOSCOPY  03/09/2018    polypectomy x 2, repeat 5 yrs, Dr. Keara Valdovinos  2009    negative    HX GI  2001    Nissen Fundoplication for severe reflux    HX HEMORRHOIDECTOMY  3/2002    HX MALIGNANT SKIN LESION EXCISION  1994    BCC excised right chest    XR CHEST PA LAT  10/2008    normal         MEDICATIONS     Current Outpatient Medications   Medication Sig    ALPRAZolam (XANAX) 0.25 mg tablet TAKE 1 TO 2 TABLETS BY MOUTH AT NIGHT AS NEEDED FOR ANXIETY (Patient taking differently: Take 0.25 mg by mouth nightly as needed. TAKE 1 TO 2 TABLETS BY MOUTH AT NIGHT AS NEEDED FOR ANXIETY)    cholecalciferol, vitamin D3, (VITAMIN D3) 2,000 unit tab Take  by mouth daily. Restarted 2-2019 daily     No current facility-administered medications for this visit.            ALLERGIES     Allergies   Allergen Reactions    Latex Other (comments)    Adhesive Rash    Ambien [Zolpidem] Other (comments)     Didn't like, vivid dreams    Ciprofloxacin Nausea Only     Nausea, achy, felt really bad    Demerol [Meperidine] Other (comments)     GI    Morphine Other (comments)     Chest pain    Other Medication Other (comments)     Nickel          SOCIAL HISTORY     Social History     Socioeconomic History    Marital status:      Spouse name: Not on file    Number of children: 1    Years of education: Not on file    Highest education level: Not on file   Occupational History    Occupation: Dental Hygenist, part time, 2 x a week   Tobacco Use    Smoking status: Never Smoker    Smokeless tobacco: Never Used   Substance and Sexual Activity    Alcohol use: No     Comment: none since 2-2016; usually very occ small glass of wine on special occasions now the past year, prior to that she had 1-2 glasses in the evenings a few x a week    Drug use: No    Sexual activity: Yes     Partners: Male Other Topics Concern    Caffeine Concern No     Comment: 1 small cup of coffee a day    Weight Concern No     Comment: stable over time in the 160's    Special Diet Yes     Comment: healthier eating habits, eating oatmeal w/ cinnamon, cut out processed foods and cut back on dairy    Exercise Yes     Comment: walks 2 miles several days a week        IMMUNIZATIONS  Immunization History   Administered Date(s) Administered    Influenza Nasal Vaccine 2014    Influenza Vaccine 2018    Influenza Vaccine (Quad) PF 2018    Tdap 2015         FAMILY HISTORY     Family History   Problem Relation Age of Onset    Breast Cancer Mother 46    Cancer Mother          of lung cancer, 59; smoker    Cancer Father          of lung cancer, 76; smoker    Diabetes Father     Heart Disease Brother          IHSS age 30    Heart Attack Brother     Hypertension Brother     Hypertension Brother     High Cholesterol Brother     Breast Cancer Maternal Grandmother 48    Heart Disease Maternal Grandmother 61    Diabetes Paternal Grandmother     Heart Disease Paternal Grandmother          CHF 76    Stroke Paternal Grandfather 79    Breast Cancer Maternal Aunt 36    Diabetes Paternal Aunt     Diabetes Paternal Uncle     Other Son         Asperger's Syndrome         VITALS     Visit Vitals  /64 (BP 1 Location: Left arm, BP Patient Position: Sitting)   Pulse 79   Temp 97.8 °F (36.6 °C) (Oral)   Resp 16   Ht 5' 5\" (1.651 m)   Wt 163 lb 9.6 oz (74.2 kg)   LMP 2011   SpO2 99%   BMI 27.22 kg/m²          PHYSICAL EXAMINATION   Physical Exam   Constitutional: No distress. Cardiovascular: Normal rate, regular rhythm and normal heart sounds. Pulmonary/Chest: Effort normal and breath sounds normal.   Abdominal: Soft. There is no tenderness. Vitals reviewed.           DIAGNOSTIC DATA         LABORATORY DATA     Results for orders placed or performed in visit on 19   CULTURE, URINE   Result Value Ref Range    Urine Culture, Routine       Mixed urogenital gurwinder  10,000-25,000 colony forming units per mL     LIPID PANEL   Result Value Ref Range    Cholesterol, total 229 (H) 100 - 199 mg/dL    Triglyceride 69 0 - 149 mg/dL    HDL Cholesterol 74 >39 mg/dL    VLDL, calculated 14 5 - 40 mg/dL    LDL, calculated 141 (H) 0 - 99 mg/dL   URINALYSIS W/ RFLX MICROSCOPIC   Result Value Ref Range    Specific Gravity 1.019 1.005 - 1.030    pH (UA) 6.5 5.0 - 7.5    Color Yellow Yellow    Appearance Clear Clear    Leukocyte Esterase Negative Negative    Protein Negative Negative/Trace    Glucose Negative Negative    Ketone Negative Negative    Blood 1+ (A) Negative    Bilirubin Negative Negative    Urobilinogen 0.2 0.2 - 1.0 mg/dL    Nitrites Negative Negative    Microscopic Examination See additional order    MICROSCOPIC EXAMINATION   Result Value Ref Range    WBC 0-5 0 - 5 /hpf    RBC 3-10 (A) 0 - 2 /hpf    Epithelial cells 0-10 0 - 10 /hpf    Casts None seen None seen /lpf    Mucus Present Not Estab.     Bacteria None seen None seen/Few   CVD REPORT   Result Value Ref Range    INTERPRETATION Note        Lab Results   Component Value Date/Time    WBC 3.9 05/10/2019 10:10 AM    HGB 14.3 05/10/2019 10:10 AM    HCT 42.2 05/10/2019 10:10 AM    PLATELET 964 67/91/9196 10:10 AM    MCV 96 05/10/2019 10:10 AM     Lab Results   Component Value Date/Time    GFR est non-AA 70 05/10/2019 10:10 AM    GFR est AA 80 05/10/2019 10:10 AM    Creatinine 0.91 05/10/2019 10:10 AM    BUN 13 05/10/2019 10:10 AM    Sodium 142 05/10/2019 10:10 AM    Potassium 4.9 05/10/2019 10:10 AM    Chloride 104 05/10/2019 10:10 AM    CO2 23 05/10/2019 10:10 AM     Lab Results   Component Value Date/Time    TSH 1.370 05/10/2019 10:10 AM      Lab Results   Component Value Date/Time    Sodium 142 05/10/2019 10:10 AM    Potassium 4.9 05/10/2019 10:10 AM    Chloride 104 05/10/2019 10:10 AM    CO2 23 05/10/2019 10:10 AM    Glucose 97 05/10/2019 10:10 AM    BUN 13 05/10/2019 10:10 AM    Creatinine 0.91 05/10/2019 10:10 AM    BUN/Creatinine ratio 14 05/10/2019 10:10 AM    GFR est AA 80 05/10/2019 10:10 AM    GFR est non-AA 70 05/10/2019 10:10 AM    Calcium 9.7 05/10/2019 10:10 AM    Bilirubin, total 0.3 05/10/2019 10:10 AM    ALT (SGPT) 23 05/10/2019 10:10 AM    AST (SGOT) 17 05/10/2019 10:10 AM    Alk. phosphatase 61 05/10/2019 10:10 AM    Protein, total 7.3 05/10/2019 10:10 AM    Albumin 4.8 05/10/2019 10:10 AM    A-G Ratio 1.9 05/10/2019 10:10 AM          ASSESSMENT & PLAN       ICD-10-CM ICD-9-CM    1. Hypercholesterolemia E78.00 272.0    2. Gross hematuria R31.0 599.71 CT ABD PELV WO CONT     Fasting labs from 9-19-19 (copied above) r/w pt today. Cholesterol has improved w/ dietary modifications. Commended on her progress thus far. Keep working on low cholesterol/heart healthy diet, work re-incorporating and building cardio exercise. Cardiovascular risk and specific lipid/LDL goals reviewed  Her follow up urine shows persistent hematuria. Her urine cultures from 9-12 and 9-19 were both negative. She did complete a course of Cipro 9-12 x 3 days. For her chronic urinary symptoms she is being seen by gyn and urologist.   Joane Bos Dr. Virl Goltz: Diagnosed w/ atrophy. Prescribed Imvexxy Estrogen Vaginal Tablets but she has not started this yet, leery of anything estrogen based. Urologist (cant recall who she saw): Urine, cystoscopy and bladder scan done, all reportedly normal. Referred for bladder PT. She has not started it yet, but decided to go ahead w/ it now. Will check CT stone study of abd/pelvis at this time.

## 2019-09-23 NOTE — PROGRESS NOTES
Chief Complaint   Patient presents with    Cholesterol Problem     had labs done    Abnormal Lab Results     urine     1. Have you been to the ER, urgent care clinic since your last visit? Hospitalized since your last visit? No    2. Have you seen or consulted any other health care providers outside of the [address] [place] [address] since your last visit? Include any pap smears or colon screening.  No

## 2019-09-23 NOTE — PATIENT INSTRUCTIONS
Blood in the Urine: Care Instructions  Your Care Instructions    Blood in the urine, or hematuria, may make the urine look red, brown, or pink. There may be blood every time you urinate or just from time to time. You cannot always see blood in the urine, but it will show up in a urine test.  Blood in the urine may be serious. It should always be checked by a doctor. Your doctor may recommend more tests, including an X-ray, a CT scan, or a cystoscopy (which lets a doctor look inside the urethra and bladder). Blood in the urine can be a sign of another problem. Common causes are bladder infections and kidney stones. An injury to your groin or your genital area can also cause bleeding in the urinary tract. Very hard exercise--such as running a marathon--can cause blood in the urine. Blood in the urine can also be a sign of kidney disease or cancer in the bladder or kidney. Many cases of blood in the urine are caused by a harmless condition that runs in families. This is called benign familial hematuria. It does not need any treatment. Sometimes your urine may look red or brown even though it does not contain blood. For example, not getting enough fluids (dehydration), taking certain medicines, or having a liver problem can change the color of your urine. Eating foods such as beets, rhubarb, or blackberries or foods with red food coloring can make your urine look red or pink. Follow-up care is a key part of your treatment and safety. Be sure to make and go to all appointments, and call your doctor if you are having problems. It's also a good idea to know your test results and keep a list of the medicines you take. When should you call for help? Call your doctor now or seek immediate medical care if:    · You have symptoms of a urinary infection. For example:  ? You have pus in your urine. ? You have pain in your back just below your rib cage. This is called flank pain. ?  You have a fever, chills, or body aches.  ? It hurts to urinate. ? You have groin or belly pain.     · You have more blood in your urine.    Watch closely for changes in your health, and be sure to contact your doctor if:    · You have new urination problems.     · You do not get better as expected. Where can you learn more? Go to http://glenny-macho.info/. Enter B688 in the search box to learn more about \"Blood in the Urine: Care Instructions. \"  Current as of: December 19, 2018  Content Version: 12.2  © 0527-1246 [employer]. Care instructions adapted under license by [employer] (which disclaims liability or warranty for this information). If you have questions about a medical condition or this instruction, always ask your healthcare professional. [address] any warranty or liability for your use of this information.

## 2019-09-24 LAB — BACTERIA UR CULT: NORMAL

## 2019-10-01 ENCOUNTER — HOSPITAL ENCOUNTER (OUTPATIENT)
Dept: CT IMAGING | Age: 59
Discharge: HOME | End: 2019-10-01
Attending: FAMILY MEDICINE
Payer: COMMERCIAL

## 2019-10-01 ENCOUNTER — TELEPHONE (OUTPATIENT)
Dept: FAMILY MEDICINE CLINIC | Age: 59
End: 2019-10-01

## 2019-10-01 DIAGNOSIS — R31.0 GROSS HEMATURIA: ICD-10-CM

## 2019-10-01 PROCEDURE — 74176 CT ABD & PELVIS W/O CONTRAST: CPT

## 2019-10-02 NOTE — TELEPHONE ENCOUNTER
PI of results.   She is scheduled with the urologist in Nov.  She will call them prn if needed before Nov.

## 2019-11-06 ENCOUNTER — HOSPITAL ENCOUNTER (OUTPATIENT)
Dept: MAMMOGRAPHY | Age: 59
Discharge: HOME | End: 2019-11-06
Attending: FAMILY MEDICINE
Payer: COMMERCIAL

## 2019-11-06 DIAGNOSIS — Z12.39 BREAST SCREENING: ICD-10-CM

## 2019-11-06 PROCEDURE — 77063 BREAST TOMOSYNTHESIS BI: CPT

## 2020-01-06 PROBLEM — Z00.00 ENCOUNTER FOR PREVENTIVE HEALTH EXAMINATION: Status: ACTIVE | Noted: 2020-01-06

## 2020-01-15 ENCOUNTER — APPOINTMENT (OUTPATIENT)
Dept: ORTHOPEDIC SURGERY | Facility: CLINIC | Age: 60
End: 2020-01-15

## 2020-02-21 ENCOUNTER — TELEPHONE (OUTPATIENT)
Dept: FAMILY MEDICINE CLINIC | Age: 60
End: 2020-02-21

## 2020-02-21 DIAGNOSIS — E78.00 HYPERCHOLESTEROLEMIA: ICD-10-CM

## 2020-02-21 DIAGNOSIS — E55.9 VITAMIN D DEFICIENCY: ICD-10-CM

## 2020-02-21 DIAGNOSIS — Z00.00 ROUTINE GENERAL MEDICAL EXAMINATION AT A HEALTH CARE FACILITY: Primary | ICD-10-CM

## 2020-02-21 NOTE — TELEPHONE ENCOUNTER
----- Message from Fannie Smoker sent at 2/21/2020  9:34 AM EST -----  Regarding: Dr. Abraham Riggs: [phone]  Caller's first and last name: n/a  Reason for call: Lab work request  Callback required yes/no and why: yes  Best contact number(s): [phone]  Details to clarify the request: Pt scheduled a CPE for 5/18/20 at 12pm. 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 Advised pt to fast 8-12 hours prior to appt, she would prefer to complete lab work prior to appt.

## 2020-02-22 NOTE — TELEPHONE ENCOUNTER
LOL! Thanks! I am trying! I have done well w/ it since the last time you advised me a few weeks ago, but there are still probably some lingering from past orders I \"futured\" a while back before this new rule! But thanks for the reminder. I have placed her routine orders w/o futuring.

## 2020-03-28 ENCOUNTER — APPOINTMENT (OUTPATIENT)
Dept: RADIOLOGY | Facility: IMAGING CENTER | Age: 60
End: 2020-03-28
Payer: COMMERCIAL

## 2020-03-28 ENCOUNTER — OUTPATIENT (OUTPATIENT)
Dept: OUTPATIENT SERVICES | Facility: HOSPITAL | Age: 60
LOS: 1 days | End: 2020-03-28
Payer: COMMERCIAL

## 2020-03-28 DIAGNOSIS — R05 COUGH: ICD-10-CM

## 2020-03-28 PROCEDURE — 71046 X-RAY EXAM CHEST 2 VIEWS: CPT | Mod: 26

## 2020-03-28 PROCEDURE — 71046 X-RAY EXAM CHEST 2 VIEWS: CPT

## 2020-04-25 ENCOUNTER — MESSAGE (OUTPATIENT)
Age: 60
End: 2020-04-25

## 2020-05-10 LAB
SARS-COV-2 IGG SERPL IA-ACNC: 7.7 INDEX
SARS-COV-2 IGG SERPL QL IA: POSITIVE

## 2020-05-13 LAB
25(OH)D3+25(OH)D2 SERPL-MCNC: 33.6 NG/ML (ref 30–100)
ALBUMIN SERPL-MCNC: 4.5 G/DL (ref 3.8–4.9)
ALBUMIN/GLOB SERPL: 2.3 {RATIO} (ref 1.2–2.2)
ALP SERPL-CCNC: 64 IU/L (ref 39–117)
ALT SERPL-CCNC: 25 IU/L (ref 0–32)
AST SERPL-CCNC: 31 IU/L (ref 0–40)
BILIRUB SERPL-MCNC: 0.5 MG/DL (ref 0–1.2)
BUN SERPL-MCNC: 17 MG/DL (ref 6–24)
BUN/CREAT SERPL: 18 (ref 9–23)
CALCIUM SERPL-MCNC: 9.5 MG/DL (ref 8.7–10.2)
CHLORIDE SERPL-SCNC: 103 MMOL/L (ref 96–106)
CHOLEST SERPL-MCNC: 231 MG/DL (ref 100–199)
CO2 SERPL-SCNC: 24 MMOL/L (ref 20–29)
CREAT SERPL-MCNC: 0.94 MG/DL (ref 0.57–1)
ERYTHROCYTE [DISTWIDTH] IN BLOOD BY AUTOMATED COUNT: 12.3 % (ref 11.7–15.4)
GLOBULIN SER CALC-MCNC: 2 G/DL (ref 1.5–4.5)
GLUCOSE SERPL-MCNC: 89 MG/DL (ref 65–99)
HCT VFR BLD AUTO: 41.1 % (ref 34–46.6)
HDLC SERPL-MCNC: 67 MG/DL
HGB BLD-MCNC: 13.7 G/DL (ref 11.1–15.9)
INTERPRETATION, 910389: NORMAL
LDLC SERPL CALC-MCNC: 150 MG/DL (ref 0–99)
MCH RBC QN AUTO: 31.7 PG (ref 26.6–33)
MCHC RBC AUTO-ENTMCNC: 33.3 G/DL (ref 31.5–35.7)
MCV RBC AUTO: 95 FL (ref 79–97)
PLATELET # BLD AUTO: 295 X10E3/UL (ref 150–450)
POTASSIUM SERPL-SCNC: 4.6 MMOL/L (ref 3.5–5.2)
PROT SERPL-MCNC: 6.5 G/DL (ref 6–8.5)
RBC # BLD AUTO: 4.32 X10E6/UL (ref 3.77–5.28)
SODIUM SERPL-SCNC: 139 MMOL/L (ref 134–144)
TRIGL SERPL-MCNC: 71 MG/DL (ref 0–149)
TSH SERPL DL<=0.005 MIU/L-ACNC: 2.14 UIU/ML (ref 0.45–4.5)
VLDLC SERPL CALC-MCNC: 14 MG/DL (ref 5–40)
WBC # BLD AUTO: 4.1 X10E3/UL (ref 3.4–10.8)

## 2020-05-16 NOTE — TELEPHONE ENCOUNTER
Patient has upcoming appt to discuss labs.   Future Appointments  Date Time Provider Lyndsey Leahy  5/18/2020 12:00 PM Mere Stewart MD

## 2020-05-18 ENCOUNTER — VIRTUAL VISIT (OUTPATIENT)
Dept: FAMILY MEDICINE CLINIC | Age: 60
End: 2020-05-18

## 2020-05-18 VITALS — WEIGHT: 164 LBS | DIASTOLIC BLOOD PRESSURE: 60 MMHG | SYSTOLIC BLOOD PRESSURE: 110 MMHG | BODY MASS INDEX: 27.29 KG/M2

## 2020-05-18 DIAGNOSIS — E78.00 HYPERCHOLESTEROLEMIA: Primary | ICD-10-CM

## 2020-05-18 RX ORDER — ALPRAZOLAM 0.25 MG/1
0.25 TABLET ORAL
COMMUNITY

## 2020-05-18 RX ORDER — ROSUVASTATIN CALCIUM 5 MG/1
5 TABLET, COATED ORAL
Qty: 90 TAB | Refills: 0 | Status: SHIPPED | OUTPATIENT
Start: 2020-05-18 | End: 2020-08-04 | Stop reason: SDUPTHER

## 2020-05-18 RX ORDER — ESCITALOPRAM OXALATE 5 MG/1
5 TABLET ORAL DAILY
COMMUNITY
End: 2022-08-02 | Stop reason: SDUPTHER

## 2020-05-18 NOTE — PROGRESS NOTES
VIRTUAL VISIT    Chief Complaint   Patient presents with    Cholesterol Problem     Follow Up    Results     Fasting Labs Done 5/12/20       HISTORY OF PRESENT ILLNESS   HPI  Cardiovascular HPI  Patient being seen via virtual visit today for follow up hypercholesterolemia and discuss fasting labs done 5/12/20. H/O Hyperlipidemia and no known cardiovascular diseases. Diet and Lifestyle: generally follows a low fat low cholesterol diet, exercises regularly, nonsmoker. Wt Readings from Last 3 Encounters:   05/18/20 164 lb (74.4 kg)   09/23/19 163 lb 9.6 oz (74.2 kg)   09/12/19 163 lb 3.2 oz (74 kg)       Home BP Monitoring: is well controlled at home    BP Readings from Last 3 Encounters:   05/18/20 110/60   09/23/19 118/64   09/12/19 110/70      REVIEW OF SYMPTOMS   Review of Systems   Constitutional: Negative. Respiratory: Negative. Cardiovascular: Negative. Gastrointestinal: Negative. Genitourinary: Negative. Neurological: Negative. Psychiatric/Behavioral:        Sees her psychiatrist q 3 months.  Doing much better, mood has improved since being on Lexapro per her psychiatrist, she is now down to taking 1 tablet of the Xanax 0.25 mg nightly for now           PROBLEM LIST/MEDICAL HISTORY     Problem List  Date Reviewed: 5/18/2020          Codes Class Noted    Gross hematuria ICD-10-CM: R31.0  ICD-9-CM: 599.71  10/1/2019    Overview Signed 10/1/2019 11:03 PM by Monico Choudhury MD     Urology eval ~ 8-2019: Cystoscopy and Bladder Scan, Negative; CT 9-2019: negative             History of recurrent UTIs ICD-10-CM: Z87.440  ICD-9-CM: V13.02  9/12/2019    Overview Signed 9/12/2019  8:46 AM by Monico Choudhury MD     Urology eval ~ 8-2019: Cystoscopy and Bladder Scan, Negative, referred for Bladder PT, pt declined             Atrophic vaginitis ICD-10-CM: N95.2  ICD-9-CM: 627.3  9/12/2019    Overview Signed 9/12/2019  8:52 AM by Monico Choudhury MD     2019 Gyn Dr Laurita Jara Dependent Edema of BLE ICD-10-CM: R60.9  ICD-9-CM: 782.3  11/3/2017        Controlled substance agreement signed ICD-10-CM: Z79.899  ICD-9-CM: V58.69  5/4/2017    Overview Addendum 3/13/2018  3:14 PM by Panda Forman MD     [id number], 3-2018             Hypercholesterolemia ICD-10-CM: E78.00  ICD-9-CM: 272.0  5/4/2017        Screening exams for skin cancer ICD-10-CM: Z12.83  ICD-9-CM: V76.43  5/4/2017    Overview Signed 5/4/2017  1:07 PM by Panda Forman MD     Per Derm Dr Bogdan Locke             Low back pain ICD-10-CM: M54.5  ICD-9-CM: 724.2  2/1/2017    Overview Signed 2/1/2017  8:33 AM by Panda Forman MD     Xrays [id number], normal, did PT             Recurrent cystitis ICD-10-CM: N30.90  ICD-9-CM: 595.9  4/28/2016    Overview Signed 3/13/2018  3:29 PM by Panda Forman MD      2014             Chronic insomnia ICD-10-CM: F51.04  ICD-9-CM: 780.52  4/16/2015    Overview Addendum 4/28/2016 11:45 AM by Panda Forman MD     Psych rx'd [facility] worked really well, but insurance stopped covering             Postmenopause, LMP 48, No HRT ICD-10-CM: Z78.0  ICD-9-CM: V49.81  4/16/2015        Menopausal symptoms ICD-10-CM: N95.1  ICD-9-CM: 627.2  4/3/2014    Overview Signed 4/3/2014 12:01 PM by Panda Forman MD     Gyn Dr Tracey Castillo, pt declined HRT             Vitamin D deficiency ICD-10-CM: E55.9  ICD-9-CM: 268.9  7/25/2011    Overview Signed 7/25/2011 10:54 PM by Panda Forman MD     7/2011             Anxiety ICD-10-CM: F41.9  ICD-9-CM: 300.00  7/20/2011    Overview Signed 5/17/2019  9:36 AM by Panda Forman MD     1997; Psych Dr Beka Reddy (retired 2016), changed to new psychiatrist Dr. Cristina Cline since spring 2018             H/O Post partum depression ICD-10-CM: O99.345, F53.0  ICD-9-CM: 648.44, 311  7/20/2011    Overview Signed 7/20/2011  9:14 AM by MD Christal Caicedo             Psoriasis ICD-10-CM: L40.9  ICD-9-CM: 696.1  7/20/2011    Overview Signed 7/20/2011  9:18 AM by Panda Forman MD     Derm Dr Mago Lo             Allergic reaction to nickel ICD-10-CM: L23.0  ICD-9-CM: 692.83  7/20/2011        Panic disorder ICD-10-CM: F41.0  ICD-9-CM: 300.01  5/19/2010    Overview Addendum 5/17/2019  9:36 AM by Panda Forman MD     Psych Dr Vijay Miles (retired 2016); 1997; Psych Dr Beka Reddy (retired 2016), changed to new psychiatrist Dr. Cristina Cline since spring 2018             Depression ICD-10-CM: F32.9  ICD-9-CM: 331  5/19/2010    Overview Addendum 5/17/2019  9:35 AM by Panda Forman MD     1997; Psych Dr Beka Reddy (retired 2016), changed to new psychiatrist Dr. Cristina Cline since spring 2018             Colon polyp ICD-10-CM: K63.5  ICD-9-CM: 211.3  5/19/2010    Overview Signed 5/19/2010 10:06 AM by Debra Doshi     Benign              Family history of breast cancer ICD-10-CM: Z80.3  ICD-9-CM: V16.3  5/19/2010        Family history of diabetes mellitus (DM) ICD-10-CM: Z83.3  ICD-9-CM: V18.0  5/19/2010        Migraine ICD-10-CM: W81.418  ICD-9-CM: 346.90  5/19/2010    Overview Addendum 4/3/2014 11:40 AM by Panda Forman MD     4/04;  Acute migraine w/ neuro changes summer 2013, [facility], CT scan negative             Screening for cardiovascular condition ICD-10-CM: Z13.6  ICD-9-CM: V81.2  5/19/2010    Overview Signed 5/19/2010 10:09 AM by Debra Doshi     For IHSS (negative)                       PAST SURGICAL HISTORY     Past Surgical History:   Procedure Laterality Date    BIOPSY CERVIX  2003    negative    COLONOSCOPY  2002    benign polyp; Dr Nicole Martinez    successful    HX COLONOSCOPY  5/2005    normal, f/u 10 yrs, Dr Shafer Odor HX COLONOSCOPY  03/09/2018    polypectomy x 2, repeat 5 yrs, Dr. Eric Garcia  2009    negative    HX GI  2001    Nissen Fundoplication for severe reflux    HX HEMORRHOIDECTOMY  3/2002    HX MALIGNANT SKIN LESION EXCISION  1994    BCC excised right chest    XR CHEST PA LAT  10/2008    normal         MEDICATIONS     Current Outpatient Medications   Medication Sig    escitalopram oxalate (Lexapro) 5 mg tablet Take 5 mg by mouth daily. Started by psychiatrist 1-2020    ALPRAZolam (Xanax) 0.25 mg tablet Take 0.25 mg by mouth nightly.  cholecalciferol, vitamin D3, (VITAMIN D3) 2,000 unit tab Take  by mouth daily. Restarted 2-2019 daily     No current facility-administered medications for this visit.            ALLERGIES     Allergies   Allergen Reactions    Latex Other (comments)    Adhesive Rash    Ambien [Zolpidem] Other (comments)     Didn't like, vivid dreams    Ciprofloxacin Nausea Only     Nausea, achy, felt really bad    Demerol [Meperidine] Other (comments)     GI    Morphine Other (comments)     Chest pain    Other Medication Other (comments)     Nickel          SOCIAL HISTORY     Social History     Socioeconomic History    Marital status:      Spouse name: Not on file    Number of children: 1    Years of education: Not on file    Highest education level: Not on file   Occupational History    Occupation: Dental Hygenist, part time, 2 x a week    Occupation: Stopped working early 2020   Tobacco Use    Smoking status: Never Smoker    Smokeless tobacco: Never Used   Substance and Sexual Activity    Alcohol use: No     Comment: none since 2-2016; usually very occ small glass of wine on special occasions now the past year, prior to that she had 1-2 glasses in the evenings a few x a week    Drug use: No    Sexual activity: Yes     Partners: Male   Other Topics Concern    Caffeine Concern No     Comment: 1 small cup of coffee a day    Weight Concern No     Comment: stable over time in the 160's    Special Diet Yes     Comment: healthier eating habits, eating oatmeal w/ cinnamon, cut out processed foods and cut back on dairy    Exercise Yes     Comment: walks 2 miles several days a week        IMMUNIZATIONS  Immunization History   Administered Date(s) Administered    Influenza Nasal Vaccine 2014    Influenza Vaccine 2018    Influenza Vaccine (Quad) Mdck Pf 2019    Influenza Vaccine (Quad) PF 2018    Tdap 2015         FAMILY HISTORY     Family History   Problem Relation Age of Onset    Breast Cancer Mother 46    Cancer Mother          of lung cancer, 59; smoker    Cancer Father          of lung cancer, 76; smoker    Diabetes Father     Heart Disease Brother          IHSS age 32    Heart Attack Brother     Hypertension Brother     Hypertension Brother     High Cholesterol Brother         on medication    Breast Cancer Maternal Grandmother 48    Heart Disease Maternal Grandmother 61    Diabetes Paternal Grandmother     Heart Disease Paternal Grandmother          CHF 76    Stroke Paternal Grandfather 79    Breast Cancer Maternal Aunt 36    Diabetes Paternal Aunt     Diabetes Paternal Uncle     Other Son         Asperger's Syndrome         VITALS   Vitals limited due to virtual visit. Self reported data collected today:    Visit Vitals  /60   Wt 164 lb (74.4 kg)   LMP 2011   BMI 27.29 kg/m²          PHYSICAL EXAMINATION   Physical Exam  Constitutional:       General: She is not in acute distress. Pulmonary:      Effort: Pulmonary effort is normal. No respiratory distress. Neurological:      Mental Status: She is alert and oriented to person, place, and time. Mental status is at baseline.    Psychiatric:         Mood and Affect: Mood normal.             DIAGNOSTIC DATA         LABORATORY DATA     Results for orders placed or performed in visit on 20   CBC W/O DIFF   Result Value Ref Range    WBC 4.1 3.4 - 10.8 x10E3/uL    RBC 4.32 3.77 - 5.28 x10E6/uL    HGB 13.7 11.1 - 15.9 g/dL    HCT 41.1 34.0 - 46.6 %    MCV 95 79 - 97 fL    MCH 31.7 26.6 - 33.0 pg    MCHC 33.3 31.5 - 35.7 g/dL    RDW 12.3 11.7 - 15.4 %    PLATELET 257 042 - 874 x10E3/uL   LIPID PANEL   Result Value Ref Range    Cholesterol, total 231 (H) 100 - 199 mg/dL    Triglyceride 71 0 - 149 mg/dL    HDL Cholesterol 67 >39 mg/dL    VLDL, calculated 14 5 - 40 mg/dL    LDL, calculated 150 (H) 0 - 99 mg/dL   METABOLIC PANEL, COMPREHENSIVE   Result Value Ref Range    Glucose 89 65 - 99 mg/dL    BUN 17 6 - 24 mg/dL    Creatinine 0.94 0.57 - 1.00 mg/dL    GFR est non-AA 67 >59 mL/min/1.73    GFR est AA 77 >59 mL/min/1.73    BUN/Creatinine ratio 18 9 - 23    Sodium 139 134 - 144 mmol/L    Potassium 4.6 3.5 - 5.2 mmol/L    Chloride 103 96 - 106 mmol/L    CO2 24 20 - 29 mmol/L    Calcium 9.5 8.7 - 10.2 mg/dL    Protein, total 6.5 6.0 - 8.5 g/dL    Albumin 4.5 3.8 - 4.9 g/dL    GLOBULIN, TOTAL 2.0 1.5 - 4.5 g/dL    A-G Ratio 2.3 (H) 1.2 - 2.2    Bilirubin, total 0.5 0.0 - 1.2 mg/dL    Alk. phosphatase 64 39 - 117 IU/L    AST (SGOT) 31 0 - 40 IU/L    ALT (SGPT) 25 0 - 32 IU/L   TSH 3RD GENERATION   Result Value Ref Range    TSH 2.140 0.450 - 4.500 uIU/mL   VITAMIN D, 25 HYDROXY   Result Value Ref Range    VITAMIN D, 25-HYDROXY 33.6 30.0 - 100.0 ng/mL   CVD REPORT   Result Value Ref Range    INTERPRETATION Note         ASSESSMENT & PLAN       ICD-10-CM ICD-9-CM    1. Hypercholesterolemia E78.00 272.0 rosuvastatin (CRESTOR) 5 mg tablet      LIPID PANEL      ALT      AST     Fasting lab results from 5- (detailed above) and schedule of future lab studies reviewed with patient  Cardiovascular risk and specific lipid/LDL goals reviewed  Discussed low risk based on ACC/AHA Risk Tool Calculator  Discussed her persistent moderate cholesterol elevations over the years despite healthy diet and regular exercise.   In light of family h/o high cholesterol and strokes, she would like to go ahead and proceed w/ trial of low dose statin therapy at this time  Start Crestor 5 mg once daily  Reviewed medication, effect, risks/benefits and potential side effects in detail  Reviewed diet, nutrition, exercise, weight management, BMI/goals, age/risk based screening recommendations, health maintenance & prevention counseling. Cancer screening USPTFS guidelines reviewed w/ pt today. Discussed benefits/positive/negative outcomes of screening based on age/risk stratification. Informed consent for/against screening based on pt's personal hx/risk factors. Updated in history above and health maintenance. Fasting follow up 3 months. Ok for labs 1 week prior, order pended and advised to call ahead for lab appt. Call back sooner in the interim prn    ----------------------------------------------------------------------------------------------------------------------------------------------------------  Patient is being evaluated by a video visit encounter for concerns as above. A caregiver was present when appropriate. Due to this being a TeleHealth encounter (During ZRQJV-73 public health emergency), evaluation of the following organ systems was limited: Vitals/Constitutional/EENT/Resp/CV/GI//MS/Neuro/Skin/Heme-Lymph-Imm. Pursuant to the emergency declaration under the [facility] waiver authority and the [employer] and [employer] Act, this Virtual  Visit was conducted, with patient's (and/or legal guardian's) consent, to reduce the patient's risk of exposure to COVID-19 and provide necessary medical care. Services were provided through a video synchronous discussion virtually to substitute for in-person clinic visit. Patient was located at their own home. I was at home while conducting this encounter. Consent:  She and/or her healthcare decision maker is aware that this patient-initiated Telehealth encounter is a billable service, with coverage as determined by her insurance carrier.  She is aware that she may receive a bill and has provided verbal consent to proceed: Yes  This virtual visit was conducted via [employer]. Pursuant to the emergency declaration under the [facility], [facility] waiver authority and the [employer] and [employer] Act, this Virtual  Visit was conducted to reduce the patient's risk of exposure to COVID-19 and provide continuity of care for an established patient. Services were provided through a video synchronous discussion virtually to substitute for in-person clinic visit. Due to this being a TeleHealth evaluation, many elements of the physical examination are unable to be assessed. Total Time: minutes: 21-30 minutes.

## 2020-08-03 ENCOUNTER — APPOINTMENT (OUTPATIENT)
Dept: FAMILY MEDICINE CLINIC | Age: 60
End: 2020-08-03

## 2020-08-03 ENCOUNTER — TELEPHONE (OUTPATIENT)
Dept: FAMILY MEDICINE CLINIC | Age: 60
End: 2020-08-03

## 2020-08-03 DIAGNOSIS — E78.00 HYPERCHOLESTEROLEMIA: Primary | ICD-10-CM

## 2020-08-03 NOTE — TELEPHONE ENCOUNTER
Patients previous lab orders were discontinued. Patient presents in office for lab orders. Previous lab orders were ALT, AST, and a Lipid.

## 2020-08-04 DIAGNOSIS — E78.00 HYPERCHOLESTEROLEMIA: ICD-10-CM

## 2020-08-04 LAB
ALT SERPL-CCNC: 17 IU/L (ref 0–32)
AST SERPL-CCNC: 19 IU/L (ref 0–40)
CHOLEST SERPL-MCNC: 168 MG/DL (ref 100–199)
HDLC SERPL-MCNC: 74 MG/DL
INTERPRETATION, 910389: NORMAL
LDLC SERPL CALC-MCNC: 83 MG/DL (ref 0–99)
TRIGL SERPL-MCNC: 54 MG/DL (ref 0–149)
VLDLC SERPL CALC-MCNC: 11 MG/DL (ref 5–40)

## 2020-08-04 NOTE — TELEPHONE ENCOUNTER
Patient is requesting refills for 90 day supply , she is requesting call back form Dr Teodora De Souza or her nurse to see if dr Teodora De Souza wants to keep her old dose for medication or change it . She has 5 tablets left . Requested Prescriptions     Pending Prescriptions Disp Refills    rosuvastatin (CRESTOR) 5 mg tablet 90 Tab 0     Sig: Take 1 Tab by mouth nightly.      Last refill : 5/18/2020  Pharmacy verified

## 2020-08-05 RX ORDER — ROSUVASTATIN CALCIUM 5 MG/1
5 TABLET, COATED ORAL
Qty: 90 TAB | Refills: 2 | Status: SHIPPED | OUTPATIENT
Start: 2020-08-05 | End: 2021-04-27

## 2020-11-18 ENCOUNTER — HOSPITAL ENCOUNTER (OUTPATIENT)
Dept: MAMMOGRAPHY | Age: 60
Discharge: HOME | End: 2020-11-18
Attending: FAMILY MEDICINE
Payer: COMMERCIAL

## 2020-11-18 DIAGNOSIS — Z12.31 VISIT FOR SCREENING MAMMOGRAM: ICD-10-CM

## 2020-11-18 PROCEDURE — 77063 BREAST TOMOSYNTHESIS BI: CPT

## 2020-12-08 ENCOUNTER — OFFICE VISIT (OUTPATIENT)
Dept: FAMILY MEDICINE CLINIC | Age: 60
End: 2020-12-08
Payer: COMMERCIAL

## 2020-12-08 VITALS
WEIGHT: 165.2 LBS | RESPIRATION RATE: 18 BRPM | DIASTOLIC BLOOD PRESSURE: 82 MMHG | BODY MASS INDEX: 27.52 KG/M2 | HEART RATE: 84 BPM | HEIGHT: 65 IN | OXYGEN SATURATION: 98 % | SYSTOLIC BLOOD PRESSURE: 100 MMHG | TEMPERATURE: 98.2 F

## 2020-12-08 DIAGNOSIS — R49.0 CHRONIC HOARSENESS: ICD-10-CM

## 2020-12-08 DIAGNOSIS — R13.10 IMPAIRED SWALLOWING ASSOCIATED WITH THROAT PAIN: Primary | ICD-10-CM

## 2020-12-08 DIAGNOSIS — R13.13 PHARYNGEAL DYSPHAGIA: ICD-10-CM

## 2020-12-08 DIAGNOSIS — R07.0 IMPAIRED SWALLOWING ASSOCIATED WITH THROAT PAIN: Primary | ICD-10-CM

## 2020-12-08 PROCEDURE — 99213 OFFICE O/P EST LOW 20 MIN: CPT | Performed by: FAMILY MEDICINE

## 2020-12-08 RX ORDER — MOMETASONE FUROATE 50 UG/1
2 SPRAY, METERED NASAL DAILY
COMMUNITY
End: 2021-05-21 | Stop reason: ALTCHOICE

## 2020-12-08 RX ORDER — IBUPROFEN 200 MG
200 TABLET ORAL AS NEEDED
COMMUNITY
End: 2021-05-21 | Stop reason: ALTCHOICE

## 2020-12-08 NOTE — PROGRESS NOTES
Chief Complaint   Patient presents with    Mass     stated lump on right side of neck for a few months. Feeling painful when swallowing to the side. 1. Have you been to the ER, urgent care clinic since your last visit? Hospitalized since your last visit? No    2. Have you seen or consulted any other health care providers outside of the [address] [place] [address] since your last visit? Include any pap smears or colon screening.  No

## 2020-12-09 NOTE — PROGRESS NOTES
Chief Complaint   Patient presents with    Neck Pain     right side of neck and throat pain w/ swallowing x 6 months    Sore Throat     right throat pain     Hoarse     since the spring ~3/2020       HISTORY OF PRESENT ILLNESS   HPI  Patient presents for evaluation of chronic recurrent right throat pain w/ swallowing x 6 months. Recently she feels like things \"inconsistently\" get stuck in her throat when she eats or drinks. She can identify things like her vitamin d tablet and salads getting stuck at times. She also sometimes feels like liquids choke her or make or cough. When she turns her head/neck to the left she feels like there is a pain in the right side of her throat especially if she is eating or drinking while doing so. Feels like she frequently has to clear her throat. She has had chronic raspiness and hoarseness since the spring. She thought this was all related to allergies so she has been using Nasonex for months but it isnt making a difference. She has a chronic dry cough. She denies chest pain, sob, sputum, wheezing, fevers, ear pain, sinus congestion, abdominal pain, n/v, reflux, heartburn, or weight loss. REVIEW OF SYMPTOMS   Review of Systems   Constitutional: Negative. Negative for chills, diaphoresis, fever and weight loss. HENT: Negative for ear pain. Respiratory: Negative for sputum production, shortness of breath and wheezing. Cardiovascular: Negative for chest pain. Gastrointestinal: Negative for abdominal pain, heartburn, nausea and vomiting.            PROBLEM LIST/MEDICAL HISTORY     Problem List  Date Reviewed: 12/8/2020          Codes Class Noted    Gross hematuria ICD-10-CM: R31.0  ICD-9-CM: 599.71  10/1/2019    Overview Signed 10/1/2019 11:03 PM by Natalie Alberts MD     Urology eval ~ 8-2019: Cystoscopy and Bladder Scan, Negative; CT 9-2019: negative             History of recurrent UTIs ICD-10-CM: Z87.440  ICD-9-CM: V13.02  9/12/2019    Overview Signed 9/12/2019  8:46 AM by Patsy Snyder MD     Urology eval ~ 0-1835: Cystoscopy and Bladder Scan, Negative, referred for Bladder PT, pt declined             Atrophic vaginitis ICD-10-CM: N95.2  ICD-9-CM: 627.3  9/12/2019    Overview Signed 9/12/2019  8:52 AM by Patsy Snyder MD     2019 Gyn Dr Ethan Aguilar             Dependent Edema of BLE ICD-10-CM: R60.9  ICD-9-CM: 782.3  11/3/2017        Controlled substance agreement signed ICD-10-CM: Z79.899  ICD-9-CM: V58.69  5/4/2017    Overview Addendum 3/13/2018  3:14 PM by Patsy Snyder MD     [id number], 3-2018             Hypercholesterolemia ICD-10-CM: E78.00  ICD-9-CM: 272.0  5/4/2017        Screening exams for skin cancer ICD-10-CM: Z12.83  ICD-9-CM: V76.43  5/4/2017    Overview Signed 5/4/2017  1:07 PM by Patsy Snyder MD     Per Derm Dr Justyn Youssef             Low back pain ICD-10-CM: M54.5  ICD-9-CM: 724.2  2/1/2017    Overview Signed 2/1/2017  8:33 AM by Patsy Snyder MD     Xrays 3-7898, normal, did PT             Recurrent cystitis ICD-10-CM: N30.90  ICD-9-CM: 595.9  4/28/2016    Overview Signed 3/13/2018  3:29 PM by Patsy Snyder MD      2014             Chronic insomnia ICD-10-CM: F51.04  ICD-9-CM: 780.52  4/16/2015    Overview Addendum 4/28/2016 11:45 AM by Patsy Snyder MD     Psych rx'd Carlos Méndez worked really well, but insurance stopped covering             Postmenopause, LMP 48, No HRT ICD-10-CM: Z78.0  ICD-9-CM: V49.81  4/16/2015        Menopausal symptoms ICD-10-CM: N95.1  ICD-9-CM: 627.2  4/3/2014    Overview Signed 4/3/2014 12:01 PM by Patsy Snyder MD     Gyn Dr Ethan Aguilar, pt declined HRT             Vitamin D deficiency ICD-10-CM: E55.9  ICD-9-CM: 268.9  7/25/2011    Overview Signed 7/25/2011 10:54 PM by Patsy Snyder MD     7/2011             Anxiety ICD-10-CM: F41.9  ICD-9-CM: 300.00  7/20/2011    Overview Signed 5/17/2019  9:36 AM by Patsy Snyder MD 1997; Psych Dr Shanta Vasques (retired 2016), changed to new psychiatrist Dr. Vicky Arriaza since spring 2018             H/O Post partum depression ICD-10-CM: O99.345, F53.0  ICD-9-CM: 648.44, 311  7/20/2011    Overview Signed 7/20/2011  9:14 AM by Delores Santana MD     1997             Psoriasis ICD-10-CM: L40.9  ICD-9-CM: 696.1  7/20/2011    Overview Signed 7/20/2011  9:18 AM by Delores Santana MD     Derm Dr Barb Starks             Allergic reaction to nickel ICD-10-CM: L23.0  ICD-9-CM: 692.83  7/20/2011        Panic disorder ICD-10-CM: F41.0  ICD-9-CM: 300.01  5/19/2010    Overview Addendum 5/17/2019  9:36 AM by Delores Santana MD     Psych Dr Raúl Bennett (retired 2016); 1997; Psych Dr Shanta Vasques (retired 2016), changed to new psychiatrist Dr. Vicky Arriaza since spring 2018             Depression ICD-10-CM: F32.9  ICD-9-CM: 241  5/19/2010    Overview Addendum 5/17/2019  9:35 AM by Delores Santana MD     1997; Psych Dr Shanta Vasques (retired 2016), changed to new psychiatrist Dr. Vicky Arriaza since spring 2018             Colon polyp ICD-10-CM: K63.5  ICD-9-CM: 211.3  5/19/2010    Overview Signed 5/19/2010 10:06 AM by Radha Carlos     Benign              Family history of breast cancer ICD-10-CM: Z80.3  ICD-9-CM: V16.3  5/19/2010        Family history of diabetes mellitus (DM) ICD-10-CM: Z83.3  ICD-9-CM: V18.0  5/19/2010        Migraine ICD-10-CM: H04.686  ICD-9-CM: 346.90  5/19/2010    Overview Addendum 4/3/2014 11:40 AM by Delores Santana MD     4/04; Acute migraine w/ neuro changes summer 2013, [facility] ER, CT scan negative             Screening for cardiovascular condition ICD-10-CM: Z13.6  ICD-9-CM: V81.2  5/19/2010    Overview Signed 5/19/2010 10:09 AM by Radha Carlos     For IHSS (negative)                       PAST SURGICAL HISTORY     Past Surgical History:   Procedure Laterality Date    BIOPSY CERVIX  2003    negative    COLONOSCOPY  2002    benign polyp;   Santos Fredis    successful    HX COLONOSCOPY  5/2005    normal, f/u 10 yrs, Dr Merline Browne  03/09/2018    polypectomy x 2, repeat 5 yrs, Dr. Amie Oneill  2009    negative    HX GI  2001    Nissen Fundoplication for severe reflux    HX HEMORRHOIDECTOMY  3/2002    HX MALIGNANT SKIN LESION EXCISION  1994    BCC excised right chest    XR CHEST PA LAT  10/2008    normal         MEDICATIONS     Current Outpatient Medications   Medication Sig    mometasone (Nasonex) 50 mcg/actuation nasal spray 2 Sprays by Both Nostrils route daily.  ibuprofen (Motrin IB) 200 mg tablet Take 200 mg by mouth as needed. Takes a few x a month    rosuvastatin (CRESTOR) 5 mg tablet Take 1 Tab by mouth nightly.  escitalopram oxalate (Lexapro) 5 mg tablet Take 5 mg by mouth daily. Started by psychiatrist 1-2020    ALPRAZolam (Xanax) 0.25 mg tablet Take 0.25 mg by mouth nightly.  cholecalciferol, vitamin D3, (VITAMIN D3) 2,000 unit tab Take  by mouth daily. Restarted 2-2019 daily     No current facility-administered medications for this visit.            ALLERGIES     Allergies   Allergen Reactions    Latex Other (comments)    Adhesive Rash    Ambien [Zolpidem] Other (comments)     Didn't like, vivid dreams    Ciprofloxacin Nausea Only     Nausea, achy, felt really bad    Demerol [Meperidine] Other (comments)     GI    Morphine Other (comments)     Chest pain    Nickel Rash          SOCIAL HISTORY     Social History     Socioeconomic History    Marital status:      Spouse name: Not on file    Number of children: 1    Years of education: Not on file    Highest education level: Not on file   Occupational History    Occupation: Dental Hygenist, part time, 2 x a week    Occupation: [employer] working early 2020   Tobacco Use    Smoking status: Never Smoker    Smokeless tobacco: Never Used   Substance and Sexual Activity    Alcohol use: No     Comment: none since ; usually very occ small glass of wine on special occasions now the past year, prior to that she had 1-2 glasses in the evenings a few x a week    Drug use: No    Sexual activity: Yes     Partners: Male   Other Topics Concern    Caffeine Concern No     Comment: 1 small cup of coffee a day    Weight Concern No     Comment: stable over time in the 160's    Special Diet Yes     Comment: healthier eating habits, eating oatmeal w/ cinnamon, cut out processed foods and cut back on dairy    Exercise Yes     Comment: walks 2 miles several days a week        IMMUNIZATIONS  Immunization History   Administered Date(s) Administered    Influenza Nasal Vaccine 2014    Influenza Vaccine 2018    Influenza Vaccine (Quad) Mdck Pf (>4 Yrs Flucelvax QUAD 69392) 2019    Influenza Vaccine (Quad) PF (>6 Mo Flulaval, Fluarix, and >3 Yrs Afluria, Fluzone [id number]) 2018    Tdap 2015         FAMILY HISTORY     Family History   Problem Relation Age of Onset    Breast Cancer Mother 46    Cancer Mother          of lung cancer, 59; smoker    Cancer Father          of lung cancer, 76; smoker    Diabetes Father     Heart Disease Brother          IHSS age 32   Rizwan.Piety Heart Attack Brother     Hypertension Brother     Hypertension Brother     High Cholesterol Brother         on medication    Breast Cancer Maternal Grandmother 48    Heart Disease Maternal Grandmother 61    Diabetes Paternal Grandmother     Heart Disease Paternal Grandmother          CHF 76    Stroke Paternal Grandfather 79    Breast Cancer Maternal Aunt 36    Diabetes Paternal Aunt     Diabetes Paternal Uncle     Other Son         Asperger's Syndrome         VITALS     Visit Vitals  /82 (BP 1 Location: Right arm, BP Patient Position: Sitting)   Pulse 84   Temp 98.2 °F (36.8 °C) (Temporal)   Resp 18   Ht 5' 5\" (1.651 m)   Wt 165 lb 3.2 oz (74.9 kg) Comment: Pt stated   LMP 2011   SpO2 98% BMI 27.49 kg/m²          PHYSICAL EXAMINATION   Physical Exam  Vitals signs reviewed. Constitutional:       General: She is not in acute distress. Appearance: Normal appearance. She is not ill-appearing. HENT:      Right Ear: Tympanic membrane normal.      Left Ear: Tympanic membrane normal.      Mouth/Throat:      Mouth: Mucous membranes are moist.      Pharynx: Oropharynx is clear. Neck:      Musculoskeletal: Neck supple. No muscular tenderness. Thyroid: No thyroid mass, thyromegaly or thyroid tenderness. Comments: No palpable masses, nodules, or tenderness  Cardiovascular:      Rate and Rhythm: Normal rate and regular rhythm. Pulmonary:      Effort: Pulmonary effort is normal. No respiratory distress. Breath sounds: Normal breath sounds. No wheezing. Lymphadenopathy:      Head:      Right side of head: No submental, submandibular or tonsillar adenopathy. Left side of head: No submental, submandibular or tonsillar adenopathy. Cervical: No cervical adenopathy. Right cervical: No superficial or posterior cervical adenopathy. Left cervical: No superficial or posterior cervical adenopathy. Upper Body:      Right upper body: No supraclavicular adenopathy. Left upper body: No supraclavicular adenopathy. Neurological:      Mental Status: She is alert.              DIAGNOSTIC DATA         LABORATORY DATA     Results for orders placed or performed in visit on 08/03/20   LIPID PANEL   Result Value Ref Range    Cholesterol, total 168 100 - 199 mg/dL    Triglyceride 54 0 - 149 mg/dL    HDL Cholesterol 74 >39 mg/dL    VLDL, calculated 11 5 - 40 mg/dL    LDL, calculated 83 0 - 99 mg/dL   ALT   Result Value Ref Range    ALT (SGPT) 17 0 - 32 IU/L   AST   Result Value Ref Range    AST (SGOT) 19 0 - 40 IU/L   CVD REPORT   Result Value Ref Range    INTERPRETATION Note        Lab Results   Component Value Date/Time    WBC 4.1 05/12/2020 08:38 AM    HGB 13.7 05/12/2020 08:38 AM    HCT 41.1 05/12/2020 08:38 AM    PLATELET 976 73/45/4310 08:38 AM    MCV 95 05/12/2020 08:38 AM     Lab Results   Component Value Date/Time    TSH 2.140 05/12/2020 08:38 AM      Lab Results   Component Value Date/Time    Sodium 139 05/12/2020 08:38 AM    Potassium 4.6 05/12/2020 08:38 AM    Chloride 103 05/12/2020 08:38 AM    CO2 24 05/12/2020 08:38 AM    Glucose 89 05/12/2020 08:38 AM    BUN 17 05/12/2020 08:38 AM    Creatinine 0.94 05/12/2020 08:38 AM    BUN/Creatinine ratio 18 05/12/2020 08:38 AM    GFR est AA 77 05/12/2020 08:38 AM    GFR est non-AA 67 05/12/2020 08:38 AM    Calcium 9.5 05/12/2020 08:38 AM    Bilirubin, total 0.5 05/12/2020 08:38 AM    ALT (SGPT) 17 08/03/2020 10:19 AM    Alk. phosphatase 64 05/12/2020 08:38 AM    Protein, total 6.5 05/12/2020 08:38 AM    Albumin 4.5 05/12/2020 08:38 AM    A-G Ratio 2.3 (H) 05/12/2020 08:38 AM          ASSESSMENT & PLAN       ICD-10-CM ICD-9-CM    1. Impaired swallowing associated with throat pain  R13.10 787.20 REFERRAL TO ENT-OTOLARYNGOLOGY    R07.0 784.1    2. Pharyngeal dysphagia  R13.13 787.23 REFERRAL TO ENT-OTOLARYNGOLOGY   3.  Chronic hoarseness  R49.0 784.42 REFERRAL TO ENT-OTOLARYNGOLOGY

## 2021-04-26 DIAGNOSIS — E78.00 HYPERCHOLESTEROLEMIA: ICD-10-CM

## 2021-04-27 RX ORDER — ROSUVASTATIN CALCIUM 5 MG/1
TABLET, COATED ORAL
Qty: 90 TAB | Refills: 1 | Status: SHIPPED | OUTPATIENT
Start: 2021-04-27 | End: 2021-10-27

## 2021-05-13 ENCOUNTER — APPOINTMENT (OUTPATIENT)
Dept: FAMILY MEDICINE CLINIC | Age: 61
End: 2021-05-13

## 2021-05-21 ENCOUNTER — VIRTUAL VISIT (OUTPATIENT)
Dept: FAMILY MEDICINE CLINIC | Age: 61
End: 2021-05-21
Payer: COMMERCIAL

## 2021-05-21 DIAGNOSIS — E78.00 HYPERCHOLESTEROLEMIA: Primary | ICD-10-CM

## 2021-05-21 DIAGNOSIS — E55.9 VITAMIN D DEFICIENCY: ICD-10-CM

## 2021-05-21 PROCEDURE — 99213 OFFICE O/P EST LOW 20 MIN: CPT | Performed by: FAMILY MEDICINE

## 2021-05-21 NOTE — ASSESSMENT & PLAN NOTE
Well controlled and at goal on statin therapy, continue current medications  Key Meds             rosuvastatin (CRESTOR) 5 mg tablet (Taking) TAKE 1 TABLET BY MOUTH EVERY DAY AT NIGHT

## 2021-05-21 NOTE — PROGRESS NOTES
VIRTUAL VISIT    Patient seen via virtual visit today for the following:    Chief Complaint   Patient presents with    Cholesterol Problem     Follow Up    Labs     Fasting labs done 5-13-21       HISTORY OF PRESENT ILLNESS   HPI  Patient was initially scheduled for a CPE w/ me yesterday having had her fasting labs done in advance last week. However I was out yesterday due to a family emergency so patient requested a virtual visit to review her labs instead of waiting to reschedule a different day for a CPE. She has been feeling really well and has no complaints or concerns at this time. She continues to comply w/ and tolerate the low dose Crestor. She has been very pleased w/ her results since starting on this over a year ago. Her numbers have been at goal ever since being on it. Diet: nothing special, eats lots of sweets, did cut out milk she used to drink a lot of, no etoh x 1 year and even prior to then was only occasionally  Exercise: walks 2 miles qd  Caffeine: 1 cup of coffee qam  Weight: 160's x years; most recent wt at home 168.8 lbs which she states is on the high end for her but she knows why, due to increased calories and sweets  Her labs are all overall good and stable and lipids all at goal. Her BS was wnl. Her vitamin d went down but she realizes now that lately she has only been taking 1,000 units daily compared to taking 2000 units before. So she will get back on the higher dose now. Her mood has remained good and stable on regimen of Lexapro and low dose Xanax at night per her psychiatrist whom she sees q 3 months     REVIEW OF SYMPTOMS   Review of Systems   Constitutional: Negative. HENT: Negative. Eyes: Negative. Respiratory: Negative. Cardiovascular: Negative. Gastrointestinal: Negative. Genitourinary: Negative. Musculoskeletal: Negative for myalgias. Neurological: Negative. Endo/Heme/Allergies: Negative. Psychiatric/Behavioral: Negative.             PROBLEM LIST/MEDICAL HISTORY     Problem List  Date Reviewed: 5/21/2021        Codes Class Noted    Gross hematuria ICD-10-CM: R31.0  ICD-9-CM: 599.71  10/1/2019    Overview Signed 10/1/2019 11:03 PM by Familia Davis MD     Urology eval ~ 8-2019: Cystoscopy and Bladder Scan, Negative; CT 9-2019: negative             History of recurrent UTIs ICD-10-CM: Z87.440  ICD-9-CM: V13.02  9/12/2019    Overview Signed 9/12/2019  8:46 AM by Familia Davis MD     Urology eval ~ 21 : Cystoscopy and Bladder Scan, Negative, referred for Bladder PT, pt declined             Atrophic vaginitis ICD-10-CM: N95.2  ICD-9-CM: 627.3  9/12/2019    Overview Signed 9/12/2019  8:52 AM by Familia Davis MD     2019 Gyn Dr Maame Alvarado             Dependent Edema of BLE ICD-10-CM: R60.9  ICD-9-CM: 782.3  11/3/2017        Controlled substance agreement signed ICD-10-CM: Z79.899  ICD-9-CM: V58.69  5/4/2017    Overview Addendum 3/13/2018  3:14 PM by Familia Davis MD     [id number], 3-2018             Hypercholesterolemia ICD-10-CM: E78.00  ICD-9-CM: 272.0  5/4/2017        Screening exams for skin cancer ICD-10-CM: Z12.83  ICD-9-CM: V76.43  5/4/2017    Overview Signed 5/4/2017  1:07 PM by Familia Davis MD     Per Derm Dr Emeterio Shrestha             Low back pain ICD-10-CM: M54.5  ICD-9-CM: 724.2  2/1/2017    Overview Signed 2/1/2017  8:33 AM by Familia Davis MD     Xrays [id number], normal, did PT             Recurrent cystitis ICD-10-CM: N30.90  ICD-9-CM: 595.9  4/28/2016    Overview Signed 3/13/2018  3:29 PM by Familia Davis MD      2014             Chronic insomnia ICD-10-CM: F51.04  ICD-9-CM: 780.52  4/16/2015    Overview Addendum 4/28/2016 11:45 AM by Familia Davis MD     Psych rx'd Adrian Holman worked really well, but insurance stopped covering             Postmenopause, LMP 48, No HRT ICD-10-CM: Z78.0  ICD-9-CM: V49.81  4/16/2015        Menopausal symptoms ICD-10-CM: N95.1  ICD-9-CM: 627.2 4/3/2014    Overview Signed 4/3/2014 12:01 PM by Choco Durán MD     Gyn Dr Adams Foley, pt declined HRT             Vitamin D deficiency ICD-10-CM: E55.9  ICD-9-CM: 268.9  7/25/2011    Overview Signed 7/25/2011 10:54 PM by Choco Durán MD     7/2011             Anxiety ICD-10-CM: F41.9  ICD-9-CM: 300.00  7/20/2011    Overview Addendum 5/21/2021  9:42 AM by Choco Durán MD     1997; Psych Dr Puja Sales (retired 2016), changed to new psychiatrist Dr. Diogenes Miranda since spring 2018, changed again to new psychiatrist Dr. Yaritza Khan             H/O Post partum depression ICD-10-CM: O99.345, F53.0  ICD-9-CM: 648.44, 311  7/20/2011    Overview Signed 7/20/2011  9:14 AM by MD Christal Rodriguez             Psoriasis ICD-10-CM: L40.9  ICD-9-CM: 696.1  7/20/2011    Overview Signed 7/20/2011  9:18 AM by Choco Durán MD     Derm Dr Blu Stewart             Allergic reaction to nickel ICD-10-CM: L23.0  ICD-9-CM: 692.83  7/20/2011        Panic disorder ICD-10-CM: F41.0  ICD-9-CM: 300.01  5/19/2010    Overview Addendum 5/17/2019  9:36 AM by Choco Durán MD     Psych Dr Mark Neely (retired 2016); 1997; Psych Dr Puja Sales (retired 2016), changed to new psychiatrist Dr. Diogenes Miranda since spring 2018             Depression ICD-10-CM: F32.9  ICD-9-CM: 571  5/19/2010    Overview Addendum 5/17/2019  9:35 AM by Choco Durán MD     1997; Psych Dr Puja Sales (retired 2016), changed to new psychiatrist Dr. Diogenes Miranda since spring 2018             Colon polyp ICD-10-CM: K63.5  ICD-9-CM: 211.3  5/19/2010    Overview Signed 5/19/2010 10:06 AM by Delicia Bolds     Benign              Family history of breast cancer ICD-10-CM: Z80.3  ICD-9-CM: V16.3  5/19/2010        Family history of diabetes mellitus (DM) ICD-10-CM: Z83.3  ICD-9-CM: V18.0  5/19/2010        Migraine ICD-10-CM: V73.738  ICD-9-CM: 346.90  5/19/2010    Overview Addendum 4/3/2014 11:40 AM by Shawnee Gambino MD     4/04; Acute migraine w/ neuro changes summer 2013, [facility], CT scan negative             Screening for cardiovascular condition ICD-10-CM: Z13.6  ICD-9-CM: V81.2  5/19/2010    Overview Signed 5/19/2010 10:09 AM by Amara Parnell     For IHSS (negative)                       PAST SURGICAL HISTORY     Past Surgical History:   Procedure Laterality Date    BIOPSY CERVIX  2003    negative    COLONOSCOPY  2002    benign polyp; Dr Jacobo Fonseca IVF  1997    successful    HX COLONOSCOPY  5/2005    normal, f/u 10 yrs, Dr Patrica Ricci HX COLONOSCOPY  03/09/2018    polypectomy x 2, repeat 5 yrs, Dr. Franck Baumann  2009    negative    HX GI  2001    Nissen Fundoplication for severe reflux    HX HEMORRHOIDECTOMY  3/2002    HX MALIGNANT SKIN LESION EXCISION  1994    BCC excised right chest    XR CHEST PA LAT  10/2008    normal         MEDICATIONS     Current Outpatient Medications   Medication Sig    rosuvastatin (CRESTOR) 5 mg tablet TAKE 1 TABLET BY MOUTH EVERY DAY AT NIGHT    escitalopram oxalate (Lexapro) 5 mg tablet Take 5 mg by mouth daily. Started by psychiatrist 1-2020    ALPRAZolam (Xanax) 0.25 mg tablet Take 0.25 mg by mouth nightly. Per psychiatrist    cholecalciferol (Vitamin D3) (1000 Units /25 mcg) tablet Take  by mouth daily. No current facility-administered medications for this visit.           ALLERGIES     Allergies   Allergen Reactions    Latex Other (comments)    Adhesive Rash    Ambien [Zolpidem] Other (comments)     Didn't like, vivid dreams    Ciprofloxacin Nausea Only     Nausea, achy, felt really bad    Demerol [Meperidine] Other (comments)     GI    Morphine Other (comments)     Chest pain    Nickel Rash          SOCIAL HISTORY     Social History     Tobacco Use    Smoking status: Never Smoker    Smokeless tobacco: Never Used   Substance Use Topics    Alcohol use: No     Comment: none since 1-2020 but even prior to then was only occasionally     Social History     Social History Narrative    , retired dental hygienist, 1 son    Diet: nothing special, eats lots of sweets, did cut out milk she used to drink a lot of, no etoh x 1 year and even prior to then was only occasionally    Exercise: walks 2 miles qd    Caffeine: 1 cup of coffee qam    Weight: 160's x years         Social History     Substance and Sexual Activity   Sexual Activity Yes    Partners: Male       IMMUNIZATIONS     Immunization History   Administered Date(s) Administered    COVID-19, PFIZER, MRNA, LNP-S, PF, 30MCG/0.3ML DOSE 2021, 2021    Influenza Nasal Vaccine 2014    Influenza Vaccine 2018    Influenza Vaccine (Quad) Mdck Pf (>4 Yrs Flucelvax QUAD 30740) 2019    Influenza Vaccine Bantr) PF (>6 Mo Flulaval, Fluarix, and >3 Yrs [address] [place] [address] Street, Fluzone [address]) 2018    Tdap 2015         FAMILY HISTORY     Family History   Problem Relation Age of Onset    Breast Cancer Mother 46    Cancer Mother          of lung cancer, 59; smoker    Cancer Father          of lung cancer, 76; smoker    Diabetes Father     Heart Disease Brother          IHSS age 32    Heart Attack Brother     Hypertension Brother     Hypertension Brother     High Cholesterol Brother         on medication    Breast Cancer Maternal Grandmother 48    Heart Disease Maternal Grandmother 61    Diabetes Paternal Grandmother     Heart Disease Paternal Grandmother          CHF 76    Stroke Paternal Grandfather 79    Breast Cancer Maternal Aunt 36    Diabetes Paternal Aunt     Diabetes Paternal Uncle     Other Son         Asperger's Syndrome         VITALS   Vitals limited due to virtual visit. Self reported data collected today:  Patient-Reported Vitals 2021   Patient-Reported Weight 168.8 lbs        PHYSICAL EXAMINATION   Physical Exam  Constitutional:       General: She is not in acute distress.   Pulmonary: Effort: Pulmonary effort is normal.   Neurological:      Mental Status: She is alert and oriented to person, place, and time. Mental status is at baseline. Psychiatric:         Mood and Affect: Mood normal.         Behavior: Behavior normal.         Thought Content: Thought content normal.         Judgment: Judgment normal.                LABORATORY DATA/ANCILLARY/IMAGING     Results for orders placed or performed in visit on 04/26/21   CBC W/O DIFF   Result Value Ref Range    WBC 3.6 3.4 - 10.8 x10E3/uL    RBC 4.22 3.77 - 5.28 x10E6/uL    HGB 13.6 11.1 - 15.9 g/dL    HCT 40.3 34.0 - 46.6 %    MCV 96 79 - 97 fL    MCH 32.2 26.6 - 33.0 pg    MCHC 33.7 31.5 - 35.7 g/dL    RDW 12.4 11.7 - 15.4 %    PLATELET 860 678 - 682 [id number]/ST   METABOLIC PANEL, COMPREHENSIVE   Result Value Ref Range    Glucose 87 65 - 99 mg/dL    BUN 13 8 - 27 mg/dL    Creatinine 0.77 0.57 - 1.00 mg/dL    GFR est non-AA 84 >59 mL/min/1.73    GFR est AA 97 >59 mL/min/1.73    BUN/Creatinine ratio 17 12 - 28    Sodium 138 134 - 144 mmol/L    Potassium 4.7 3.5 - 5.2 mmol/L    Chloride 103 96 - 106 mmol/L    CO2 24 20 - 29 mmol/L    Calcium 9.0 8.7 - 10.3 mg/dL    Protein, total 6.6 6.0 - 8.5 g/dL    Albumin 4.3 3.8 - 4.9 g/dL    GLOBULIN, TOTAL 2.3 1.5 - 4.5 g/dL    A-G Ratio 1.9 1.2 - 2.2    Bilirubin, total 0.3 0.0 - 1.2 mg/dL    Alk. phosphatase 64 39 - 117 IU/L    AST (SGOT) 21 0 - 40 IU/L    ALT (SGPT) 17 0 - 32 IU/L   LIPID PANEL   Result Value Ref Range    Cholesterol, total 166 100 - 199 mg/dL    Triglyceride 46 0 - 149 mg/dL    HDL Cholesterol 71 >39 mg/dL    VLDL, calculated 10 5 - 40 mg/dL    LDL, calculated 85 0 - 99 mg/dL   TSH 3RD GENERATION   Result Value Ref Range    TSH 1.460 0.450 - 4.500 uIU/mL   VITAMIN D, 25 HYDROXY   Result Value Ref Range    VITAMIN D, 25-HYDROXY 28.6 (L) 30.0 - 100.0 ng/mL   CVD REPORT   Result Value Ref Range    INTERPRETATION Note           ASSESSMENT & PLAN   Diagnoses and all orders for this visit:    1. Hypercholesterolemia  Assessment & Plan:  Well controlled and at goal on statin therapy, continue current medications  Key Meds             rosuvastatin (CRESTOR) 5 mg tablet (Taking) TAKE 1 TABLET BY MOUTH EVERY DAY AT NIGHT        2. Vitamin D deficiency   Her vitamin d went down but she realizes now that lately she has only been taking 1,000 units daily  compared to taking 2000 units before. So she will get back on the higher dose now. All of her recent fasting lab results from 5/13/21 above, cardiovascular risk and specific lipid/LDL goals reviewed  Reviewed diet, nutrition, exercise, weight management, BMI/goals. Age/risk based screening recommendations, health maintenance & prevention counseling. Cancer screening USPTFS guidelines reviewed w/ pt today. Discussed benefits/positive/negative outcomes of screening based on age/risk stratification. Informed consent for/against screening based on pt's personal hx/risk factors. Updated in history above and health maintenance. Sees gyn annually for well woman visits/gyn exams, pap reportedly normal   Mammogram negative   Colonoscopy up to date  RTC 1 yr for fasting annual checkup. Follow up sooner prn    ---------------------------------------------------------------------------------------------------------------  Patient is being evaluated by a virtual/ video visit encounter to address concerns as noted above. Patient identification was verified at the start of the visit: YES  A caregiver was present when appropriate. Due to this being a TeleHealth encounter (During QELKG-78 public health emergency), evaluation of the following organ systems was limited: Vitals/Constitutional/EENT/Resp/CV/GI//MS/Neuro/Skin/Heme-Lymph-Imm.   Pursuant to the emergency declaration under the [facility], [facility] authority and the [employer] Act, this Virtual Visit was conducted, with patient's (and/or legal guardian's) consent, to reduce the patient's risk of exposure to COVID-19 and provide necessary medical care. Services were provided through a video synchronous discussion virtually to substitute for in-person clinic visit. The patient (and/or legal guardian) has also been advised to contact this office for worsening conditions or problems, and seek emergency medical treatment and/or call 911 if deemed necessary. Patient was located at their own home. I was in the office while conducting this encounter. Consent:  She and/or her healthcare decision maker is aware that this patient-initiated Telehealth encounter is a billable service, with coverage as determined by her insurance carrier. She is aware that she may receive a bill and has provided verbal consent to proceed: Yes  This virtual visit was conducted via [employer]. Pursuant to the emergency declaration under the [facility], [facility] waiver authority and the [employer] and [employer] Act, this Virtual  Visit was conducted to reduce the patient's risk of exposure to COVID-19 and provide continuity of care for an established patient. Services were provided through a video synchronous discussion virtually to substitute for in-person clinic visit. Due to this being a TeleHealth evaluation, many elements of the physical examination are unable to be assessed. Total Time: minutes: 20. An electronic signature was used to authenticate this note.   ~Vandana Kaminski MD~

## 2021-07-06 ENCOUNTER — TRANSCRIBE ORDER (OUTPATIENT)
Dept: SCHEDULING | Age: 61
End: 2021-07-06

## 2021-07-06 DIAGNOSIS — Z12.31 SCREENING MAMMOGRAM, ENCOUNTER FOR: Primary | ICD-10-CM

## 2021-07-26 ENCOUNTER — TELEPHONE (OUTPATIENT)
Dept: FAMILY MEDICINE CLINIC | Age: 61
End: 2021-07-26

## 2021-07-26 NOTE — TELEPHONE ENCOUNTER
----- Message from Lindon Severance sent at 7/26/2021  8:39 AM EDT -----  Regarding: Dr. Kalyan Hurtado Message/Vendor Calls    Caller's first and last name: Cathy Blanco      Reason for call: possible bladder infection    Callback required yes/no and why:yes      Best contact number(s):[phone]      Details to clarify the request:if she can be seen sooner than 7/28/21 can she drop off urine sample      Lindon Severance

## 2021-07-26 NOTE — TELEPHONE ENCOUNTER
Called, spoke to pt. Two pt identifiers confirmed. Patient stated that she is drinking Cranberry juice and its and feeling somewhat better.  She is willing to wait until  Wednesday

## 2021-07-28 ENCOUNTER — OFFICE VISIT (OUTPATIENT)
Dept: FAMILY MEDICINE CLINIC | Age: 61
End: 2021-07-28
Payer: COMMERCIAL

## 2021-07-28 VITALS
HEIGHT: 65 IN | WEIGHT: 166.6 LBS | OXYGEN SATURATION: 98 % | RESPIRATION RATE: 18 BRPM | HEART RATE: 77 BPM | SYSTOLIC BLOOD PRESSURE: 98 MMHG | DIASTOLIC BLOOD PRESSURE: 62 MMHG | TEMPERATURE: 98 F | BODY MASS INDEX: 27.76 KG/M2

## 2021-07-28 DIAGNOSIS — N30.01 ACUTE CYSTITIS WITH HEMATURIA: Primary | ICD-10-CM

## 2021-07-28 LAB
BILIRUB UR QL STRIP: NEGATIVE
GLUCOSE UR-MCNC: NEGATIVE MG/DL
KETONES P FAST UR STRIP-MCNC: NEGATIVE MG/DL
PH UR STRIP: 6 [PH] (ref 4.6–8)
PROT UR QL STRIP: NEGATIVE
SP GR UR STRIP: 1.01 (ref 1–1.03)
UA UROBILINOGEN AMB POC: NORMAL (ref 0.2–1)
URINALYSIS CLARITY POC: NORMAL
URINALYSIS COLOR POC: NORMAL
URINE BLOOD POC: NORMAL
URINE LEUKOCYTES POC: NORMAL
URINE NITRITES POC: NEGATIVE

## 2021-07-28 PROCEDURE — 99213 OFFICE O/P EST LOW 20 MIN: CPT | Performed by: FAMILY MEDICINE

## 2021-07-28 PROCEDURE — 81002 URINALYSIS NONAUTO W/O SCOPE: CPT | Performed by: FAMILY MEDICINE

## 2021-07-28 RX ORDER — NITROFURANTOIN 25; 75 MG/1; MG/1
100 CAPSULE ORAL 2 TIMES DAILY
Qty: 14 CAPSULE | Refills: 0 | Status: SHIPPED | OUTPATIENT
Start: 2021-07-28 | End: 2021-08-04

## 2021-07-28 NOTE — PROGRESS NOTES
Chief Complaint   Patient presents with    Bladder Infection     1. Have you been to the ER, urgent care clinic since your last visit? Hospitalized since your last visit? No    2. Have you seen or consulted any other health care providers outside of the [address] [place] [address] since your last visit? Include any pap smears or colon screening.  No

## 2021-07-28 NOTE — PROGRESS NOTES
urgency/frequency/bladder spasms are constant and that is what she saw urologist and gyn for. Gyn Dr. Lyndon Arredondo: Diagnosed w/ atrophy. Prescribed Imvexxy Estrogen Vaginal Tablets but she has not started this yet, leery of anything estrogen based. Urologist (cant recall who she saw): Urine, cystoscopy and bladder scan done, all reportedly normal. Referred for bladder PT. She has not started it yet, but decided to go ahead w/ it now.

## 2021-07-28 NOTE — PROGRESS NOTES
Chief Complaint   Patient presents with    Bladder Infection     x 4-5 days       HISTORY OF PRESENT ILLNESS   HPI  UTI NOTE    Duration or Onset of Symptoms: 5 days    Positive Symptoms: urinary urgency, pressure, burning, hematuria x 2 days, incontinence episode and fatigue on 1st day of symptom onset    Negative Symptoms: denies flank pain, abdominal pain, back pain, nausea, vomiting, fevers, chills    Remedies Tried: took AZO yesterday which helped the burning; drinking Cranberry Juice since yesterday; these have helped symptoms subside some    History of Recurrent UTI's: Yes, see below    Recent UTI History: Last UTI ~      Prior Successful Antibiotic Treatment: didn't tolerate Cipro but did fine on Macrobid    History of Pyelonephritis:      Past  History:   Recurrent UTI's: Renal US/Urology workup negative  2019: Hematuria/urgency/frequency/bladder spasms/UTI's: Urology evaluation: Urine, cystoscopy and bladder scan done, all reportedly normal. CT Abd/Pelvis negative ; Referred for bladder PT. Gyn Dr. Radha Castro: Diagnosed w/ atrophy. Prescribed Imvexxy Estrogen Vaginal Tablets    Sexually Active: Yes    LMP: , LMP 46 yo, No HRT history    Caffeine Usage: only 1 cup of coffee a day    Non smoker    Rare etoh    Social History     Tobacco Use   Smoking Status Never Smoker   Smokeless Tobacco Never Used     Social History     Substance and Sexual Activity   Alcohol Use No    Comment: none since  but even prior to then was only occasionally     OB History        1    Para   1    Term                AB        Living           SAB        TAB        Ectopic        Molar        Multiple        Live Births              Obstetric Comments   Menarche:  15. LMP: 53.  # of Children:  1. Age at Delivery of First Child:  39.   Hysterectomy/oophorectomy:  NO/NO. Breast Bx:  no.  Hx of Breast Feeding:  yes.   BCP:  no. Hormone therapy:  No;  x 1, s/p IVF in ; Gyn Dr Radha Castro REVIEW OF SYMPTOMS   Review of Systems   Constitutional: Negative for chills and fever. Gastrointestinal: Negative for abdominal pain, nausea and vomiting. Genitourinary: Positive for dysuria, hematuria and urgency. Negative for flank pain. Musculoskeletal: Negative for back pain.            PROBLEM LIST/MEDICAL HISTORY     Problem List  Date Reviewed: 7/28/2021        Codes Class Noted    Gross hematuria ICD-10-CM: R31.0  ICD-9-CM: 599.71  10/1/2019    Overview Signed 10/1/2019 11:03 PM by Roberta Castaneda MD     Urology eval ~ 8-2019: Cystoscopy and Bladder Scan, Negative; CT 9-2019: negative             History of recurrent UTIs ICD-10-CM: Z87.440  ICD-9-CM: V13.02  9/12/2019    Overview Addendum 7/28/2021  2:20 PM by Roberta Castaneda MD     Urology eval ~ 21 : Cystoscopy and Bladder Scan, Negative, referred for Bladder PT             Atrophic vaginitis ICD-10-CM: N95.2  ICD-9-CM: 627.3  9/12/2019    Overview Signed 9/12/2019  8:52 AM by Roberta Castaneda MD     2019 Gyn Dr Sari Kerr             Dependent Edema of BLE ICD-10-CM: R60.9  ICD-9-CM: 782.3  11/3/2017        Controlled substance agreement signed ICD-10-CM: Z79.899  ICD-9-CM: V58.69  5/4/2017    Overview Addendum 3/13/2018  3:14 PM by Roberta Castaneda MD     5-1185, 3-2018             Hypercholesterolemia ICD-10-CM: E78.00  ICD-9-CM: 272.0  5/4/2017        Screening exams for skin cancer ICD-10-CM: Z12.83  ICD-9-CM: V76.43  5/4/2017    Overview Signed 5/4/2017  1:07 PM by Roberta Castaneda MD     Per Derm Dr Carrasco Grade             Low back pain ICD-10-CM: M54.5  ICD-9-CM: 724.2  2/1/2017    Overview Signed 2/1/2017  8:33 AM by Roberta Castaneda MD     Xrays 3-4743, normal, did PT             Recurrent cystitis ICD-10-CM: N30.90  ICD-9-CM: 595.9  4/28/2016    Overview Signed 3/13/2018  3:29 PM by Roberta Castaneda MD      2014             Chronic insomnia ICD-10-CM: F51.04  ICD-9-CM: 780.52 4/16/2015    Overview Addendum 4/28/2016 11:45 AM by Tami Rinne, MD     Psych rx'd Tali Astorga worked really well, but insurance stopped covering             Postmenopause, LMP 48, No HRT ICD-10-CM: Z78.0  ICD-9-CM: V49.81  4/16/2015        Menopausal symptoms ICD-10-CM: N95.1  ICD-9-CM: 627.2  4/3/2014    Overview Signed 4/3/2014 12:01 PM by Tami Rinne, MD     Gyn Dr Lu Faulkner, pt declined HRT             Vitamin D deficiency ICD-10-CM: E55.9  ICD-9-CM: 268.9  7/25/2011    Overview Signed 7/25/2011 10:54 PM by Tami Rinne, MD     7/2011             Anxiety ICD-10-CM: F41.9  ICD-9-CM: 300.00  7/20/2011    Overview Addendum 5/21/2021  9:42 AM by Tami Rinne, MD     1997; Psych Dr Hamzah Espinal (retired 2016), changed to new psychiatrist Dr. Dc Fiore since spring 2018, changed again to new psychiatrist Dr. Alvin Caal             H/O Post partum depression ICD-10-CM: O99.345, F53.0  ICD-9-CM: 648.44, 311  7/20/2011    Overview Signed 7/20/2011  9:14 AM by Tami Rinne, MD     1997             Psoriasis ICD-10-CM: L40.9  ICD-9-CM: 696.1  7/20/2011    Overview Signed 7/20/2011  9:18 AM by Tami Rinne, MD     Derm Dr Jesse Sanchez             Allergic reaction to nickel ICD-10-CM: L23.0  ICD-9-CM: 692.83  7/20/2011        Panic disorder ICD-10-CM: F41.0  ICD-9-CM: 300.01  5/19/2010    Overview Addendum 5/17/2019  9:36 AM by Tami Rinne, MD     Psych Dr Carli Pichardo (retired 2016); 1997; Psych Dr Hamzah Espinal (retired 2016), changed to new psychiatrist Dr. Dc Fiore since spring 2018             Depression ICD-10-CM: F32.9  ICD-9-CM: 222  5/19/2010    Overview Addendum 5/17/2019  9:35 AM by Tami Rinne, MD     1997; Psych Dr Hamzah Espinal (retired 2016), changed to new psychiatrist Dr. Dc Fiore since spring 2018             Colon polyp ICD-10-CM: K63.5  ICD-9-CM: 211.3  5/19/2010    Overview Signed 5/19/2010 10:06 AM by Elsa Rowley Benign              Family history of breast cancer ICD-10-CM: Z80.3  ICD-9-CM: V16.3  5/19/2010        Family history of diabetes mellitus (DM) ICD-10-CM: Z83.3  ICD-9-CM: V18.0  5/19/2010        Migraine ICD-10-CM: M82.092  ICD-9-CM: 346.90  5/19/2010    Overview Addendum 4/3/2014 11:40 AM by Jay Delaney MD     4/04; Acute migraine w/ neuro changes summer 2013, [facility], CT scan negative             Screening for cardiovascular condition ICD-10-CM: Z13.6  ICD-9-CM: V81.2  5/19/2010    Overview Signed 5/19/2010 10:09 AM by Flaquito Moreira     For IHSS (negative)                       PAST SURGICAL HISTORY     Past Surgical History:   Procedure Laterality Date    BIOPSY CERVIX  2003    negative    COLONOSCOPY  2002    benign polyp; Dr Imtiaz Levine IVF  1997    successful    HX COLONOSCOPY  5/2005    normal, f/u 10 yrs, Dr Michael Munguia HX COLONOSCOPY  03/09/2018    polypectomy x 2, repeat 5 yrs, Dr. Lim Severe  2009    negative    HX GI  2001    Nissen Fundoplication for severe reflux    HX HEMORRHOIDECTOMY  3/2002    HX MALIGNANT SKIN LESION EXCISION  1994    BCC excised right chest    XR CHEST PA LAT  10/2008    normal         MEDICATIONS     Current Outpatient Medications   Medication Sig    rosuvastatin (CRESTOR) 5 mg tablet TAKE 1 TABLET BY MOUTH EVERY DAY AT NIGHT    escitalopram oxalate (Lexapro) 5 mg tablet Take 5 mg by mouth daily. Started by psychiatrist 1-2020    ALPRAZolam (Xanax) 0.25 mg tablet Take 0.25 mg by mouth nightly. Per psychiatrist    cholecalciferol (Vitamin D3) (1000 Units /25 mcg) tablet Take 2,000 Units by mouth daily. No current facility-administered medications for this visit.           ALLERGIES     Allergies   Allergen Reactions    Latex Other (comments)    Adhesive Rash    Ambien [Zolpidem] Other (comments)     Didn't like, vivid dreams    Ciprofloxacin Nausea Only     Nausea, achy, felt really bad    Demerol [Meperidine] Other (comments)     GI    Morphine Other (comments)     Chest pain    Nickel Rash          SOCIAL HISTORY     Social History     Tobacco Use    Smoking status: Never Smoker    Smokeless tobacco: Never Used   Substance Use Topics    Alcohol use: No     Comment: none since  but even prior to then was only occasionally     Social History     Social History Narrative    , retired dental hygienist, 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    Diet: nothing special, eats lots of sweets, did cut out milk she used to drink a lot of, no etoh x 1 year and even prior to then was only occasionally    Exercise: walks 2 miles qd    Caffeine: 1 cup of coffee qam    Weight: 160's x years         Social History     Substance and Sexual Activity   Sexual Activity Yes    Partners: Male       IMMUNIZATIONS     Immunization History   Administered Date(s) Administered    COVID-19, PFIZER, MRNA, LNP-S, PF, 30MCG/0.3ML DOSE 2021, 2021    Influenza Nasal Vaccine 2014    Influenza Vaccine 2018    Influenza Vaccine (Quad) Mdck Pf (>4 Yrs Flucelvax QUAD 61555) 2019    Influenza Vaccine K2 Media) PF (>6 Mo Flulaval, Fluarix, and >3 Yrs [address] [place] [address], Fluzone [id number]) 2018    Tdap 2015         FAMILY HISTORY     Family History   Problem Relation Age of Onset    Breast Cancer Mother 46    Cancer Mother          of lung cancer, 59; smoker    Cancer Father          of lung cancer, 76; smoker    Diabetes Father     Heart Disease Brother          IHSS age 32    Heart Attack Brother     Hypertension Brother     Hypertension Brother     High Cholesterol Brother         on medication    Breast Cancer Maternal Grandmother 48    Heart Disease Maternal Grandmother 61    Diabetes Paternal Grandmother     Heart Disease Paternal Grandmother          CHF 76    Stroke Paternal Grandfather 79    Breast Cancer Maternal Aunt 36    Diabetes Paternal Aunt     Diabetes Paternal Irina Burns Other Son         Asperger's Syndrome         VITALS     Visit Vitals  BP 98/62 (BP 1 Location: Left upper arm, BP Patient Position: Sitting, BP Cuff Size: Adult)   Pulse 77   Temp 98 °F (36.7 °C) (Temporal)   Resp 18   Ht 5' 5\" (1.651 m)   Wt 166 lb 9.6 oz (75.6 kg)   LMP 07/19/2011   SpO2 98%   BMI 27.72 kg/m²          PHYSICAL EXAMINATION   Physical Exam  Vitals reviewed. Constitutional:       General: She is not in acute distress. Appearance: Normal appearance. She is not ill-appearing. Cardiovascular:      Rate and Rhythm: Normal rate and regular rhythm. Abdominal:      General: Abdomen is flat. There is no distension. Palpations: Abdomen is soft. Tenderness: There is no abdominal tenderness. There is no right CVA tenderness or left CVA tenderness. Neurological:      Mental Status: She is alert. LABORATORY DATA/ANCILLARY/IMAGING     Results for orders placed or performed in visit on 04/26/21   CBC W/O DIFF   Result Value Ref Range    WBC 3.6 3.4 - 10.8 x10E3/uL    RBC 4.22 3.77 - 5.28 x10E6/uL    HGB 13.6 11.1 - 15.9 g/dL    HCT 40.3 34.0 - 46.6 %    MCV 96 79 - 97 fL    MCH 32.2 26.6 - 33.0 pg    MCHC 33.7 31.5 - 35.7 g/dL    RDW 12.4 11.7 - 15.4 %    PLATELET 923 121 - 874 O08C0/JV   METABOLIC PANEL, COMPREHENSIVE   Result Value Ref Range    Glucose 87 65 - 99 mg/dL    BUN 13 8 - 27 mg/dL    Creatinine 0.77 0.57 - 1.00 mg/dL    GFR est non-AA 84 >59 mL/min/1.73    GFR est AA 97 >59 mL/min/1.73    BUN/Creatinine ratio 17 12 - 28    Sodium 138 134 - 144 mmol/L    Potassium 4.7 3.5 - 5.2 mmol/L    Chloride 103 96 - 106 mmol/L    CO2 24 20 - 29 mmol/L    Calcium 9.0 8.7 - 10.3 mg/dL    Protein, total 6.6 6.0 - 8.5 g/dL    Albumin 4.3 3.8 - 4.9 g/dL    GLOBULIN, TOTAL 2.3 1.5 - 4.5 g/dL    A-G Ratio 1.9 1.2 - 2.2    Bilirubin, total 0.3 0.0 - 1.2 mg/dL    Alk.  phosphatase 64 39 - 117 IU/L    AST (SGOT) 21 0 - 40 IU/L    ALT (SGPT) 17 0 - 32 IU/L   LIPID PANEL   Result Value Ref Range    Cholesterol, total 166 100 - 199 mg/dL    Triglyceride 46 0 - 149 mg/dL    HDL Cholesterol 71 >39 mg/dL    VLDL, calculated 10 5 - 40 mg/dL    LDL, calculated 85 0 - 99 mg/dL   TSH 3RD GENERATION   Result Value Ref Range    TSH 1.460 0.450 - 4.500 uIU/mL   VITAMIN D, 25 HYDROXY   Result Value Ref Range    VITAMIN D, 25-HYDROXY 28.6 (L) 30.0 - 100.0 ng/mL   CVD REPORT   Result Value Ref Range    INTERPRETATION Note      Results for orders placed or performed in visit on 07/28/21   AMB POC URINALYSIS DIP STICK MANUAL W/O MICRO     Status: None   Result Value Ref Range Status    Color (UA POC)       Clarity (UA POC)       Glucose (UA POC) Negative Negative Final    Bilirubin (UA POC) Negative Negative Final    Ketones (UA POC) Negative Negative Final    Specific gravity (UA POC) 1.015 1.001 - 1.035 Final    Blood (UA POC) 2+ Negative Final    pH (UA POC) 6.0 4.6 - 8.0 Final    Protein (UA POC) Negative Negative Final    Urobilinogen (UA POC) 0.2 mg/dL 0.2 - 1 Final    Nitrites (UA POC) Negative Negative Final    Leukocyte esterase (UA POC) 1+ Negative Final          ASSESSMENT & PLAN       ICD-10-CM ICD-9-CM    1.  Acute cystitis with hematuria  N30.01 595.0 nitrofurantoin, macrocrystal-monohydrate, (MACROBID) 100 mg capsule      AMB POC URINALYSIS DIP STICK MANUAL W/O MICRO      CULTURE, URINE     Urine sent for C&S  Start Macrobid 100 mg bid  Push fluids  Continue AZO prn for now  Further recommendations pending review of C&S and clinical course  Call back/follow up sooner if anything worsens in the interim

## 2021-07-30 DIAGNOSIS — R31.29 MICROHEMATURIA: Primary | ICD-10-CM

## 2021-07-30 LAB — BACTERIA UR CULT: NORMAL

## 2021-08-16 ENCOUNTER — APPOINTMENT (OUTPATIENT)
Dept: FAMILY MEDICINE CLINIC | Age: 61
End: 2021-08-16

## 2021-08-18 LAB
APPEARANCE UR: CLEAR
BACTERIA #/AREA URNS HPF: ABNORMAL /[HPF]
BACTERIA UR CULT: NORMAL
BILIRUB UR QL STRIP: NEGATIVE
CASTS URNS QL MICRO: ABNORMAL /LPF
COLOR UR: YELLOW
EPI CELLS #/AREA URNS HPF: ABNORMAL /HPF (ref 0–10)
GLUCOSE UR QL: NEGATIVE
HGB UR QL STRIP: ABNORMAL
KETONES UR QL STRIP: NEGATIVE
LEUKOCYTE ESTERASE UR QL STRIP: NEGATIVE
MICRO URNS: ABNORMAL
NITRITE UR QL STRIP: NEGATIVE
PH UR STRIP: 7 [PH] (ref 5–7.5)
PROT UR QL STRIP: NEGATIVE
RBC #/AREA URNS HPF: ABNORMAL /HPF (ref 0–2)
SP GR UR: 1.01 (ref 1–1.03)
UROBILINOGEN UR STRIP-MCNC: 0.2 MG/DL (ref 0.2–1)
WBC #/AREA URNS HPF: ABNORMAL /HPF (ref 0–5)

## 2021-10-01 ENCOUNTER — TRANSCRIBE ORDER (OUTPATIENT)
Dept: SCHEDULING | Age: 61
End: 2021-10-01

## 2021-10-01 DIAGNOSIS — K21.9 ESOPHAGEAL REFLUX: Primary | ICD-10-CM

## 2021-10-22 ENCOUNTER — HOSPITAL ENCOUNTER (OUTPATIENT)
Dept: GENERAL RADIOLOGY | Age: 61
Discharge: HOME | End: 2021-10-22
Attending: OTOLARYNGOLOGY
Payer: COMMERCIAL

## 2021-10-22 DIAGNOSIS — K21.9 ESOPHAGEAL REFLUX: ICD-10-CM

## 2021-10-22 PROCEDURE — 74220 X-RAY XM ESOPHAGUS 1CNTRST: CPT

## 2021-10-27 DIAGNOSIS — E78.00 HYPERCHOLESTEROLEMIA: ICD-10-CM

## 2021-10-27 RX ORDER — ROSUVASTATIN CALCIUM 5 MG/1
TABLET, COATED ORAL
Qty: 90 TABLET | Refills: 1 | Status: SHIPPED | OUTPATIENT
Start: 2021-10-27 | End: 2022-04-18

## 2021-11-22 ENCOUNTER — HOSPITAL ENCOUNTER (OUTPATIENT)
Dept: MAMMOGRAPHY | Age: 61
Discharge: HOME | End: 2021-11-22
Attending: FAMILY MEDICINE

## 2021-11-22 DIAGNOSIS — Z12.31 SCREENING MAMMOGRAM, ENCOUNTER FOR: ICD-10-CM

## 2022-01-10 ENCOUNTER — TELEPHONE (OUTPATIENT)
Dept: FAMILY MEDICINE CLINIC | Age: 62
End: 2022-01-10

## 2022-01-10 DIAGNOSIS — E78.00 HYPERCHOLESTEROLEMIA: ICD-10-CM

## 2022-01-10 DIAGNOSIS — E55.9 VITAMIN D DEFICIENCY: ICD-10-CM

## 2022-01-10 DIAGNOSIS — Z00.00 ROUTINE GENERAL MEDICAL EXAMINATION AT A HEALTH CARE FACILITY: Primary | ICD-10-CM

## 2022-01-10 NOTE — TELEPHONE ENCOUNTER
Chief Complaint   Patient presents with   [facility]     Patient requesting for lab orders to be placed prior to her physical exam on 8/22/2022. Patient scheduled for 8/22/2022, would like to have labs completed prior to physical exam appointment.   Mauro Best LPN

## 2022-01-10 NOTE — TELEPHONE ENCOUNTER
----- Message from Veronica Sharmila sent at 1/10/2022  9:33 AM EST -----  Subject: Referral Request    QUESTIONS   Reason for referral request? Patient need lab work order before physicals   exam on 8/22/2022. Patient comes in a day before to have lab work done. Has the physician seen you for this condition before? Yes  Select a date? 2021-08-16  Select the Provider the patient wants to be referred to, if known (PCP or   Specialist)? Vandana Parker   Preferred Specialist (if applicable)? Do you already have an appointment scheduled? Yes  Select Scheduled Date? 2022-08-22  Select Scheduled Physician? Vandana Parker   Additional Information for Provider?   ---------------------------------------------------------------------------  --------------  Debra ANGLIN  What is the best way for the office to contact you? OK to leave message on   voicemail  Preferred Call Back Phone Number?  [phone]

## 2022-01-12 NOTE — TELEPHONE ENCOUNTER
Spoke to patient , 2 identifiers confirmed.   Patient has been scheduled for fasting labs 8/15/2022 at 8:15 am.  Cat Roberts LPN

## 2022-01-13 ENCOUNTER — HOSPITAL ENCOUNTER (OUTPATIENT)
Dept: MAMMOGRAPHY | Age: 62
Discharge: HOME | End: 2022-01-13
Attending: FAMILY MEDICINE
Payer: COMMERCIAL

## 2022-01-13 PROCEDURE — 77063 BREAST TOMOSYNTHESIS BI: CPT

## 2022-02-11 NOTE — PROGRESS NOTES
HPI: Hamzah Diaz (: 1960) is a 64 y.o. female, patient, here for evaluation of the following chief complaint(s):    Elbow Pain (Right elbow pain after blowing leafs in October)  Patient is seen today to evaluate her right arm. She has complained of pain since 2021. She recalls she was blowing leaves when she later experienced pain to the lateral aspect of her right elbow more than medially. 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 She complains that the pain prevents her from handling simple objects. She has tried treatment modalities including ice, heat, soaks and ibuprofen without long-term relief. She even reports it is difficulty to walk due to elbow extension that aggravates pain. She is now seen for further treatment of her elbow pain. Vitals:  Ht 5' 6\" (1.676 m)   Wt 163 lb (73.9 kg)   LMP 2011   BMI 26.31 kg/m²    Body mass index is 26.31 kg/m². Allergies   Allergen Reactions    Latex Other (comments)    Adhesive Rash    Ambien [Zolpidem] Other (comments)     Didn't like, vivid dreams    Ciprofloxacin Nausea Only     Nausea, achy, felt really bad    Demerol [Meperidine] Other (comments)     GI    Morphine Other (comments)     Chest pain    Nickel Rash       Current Outpatient Medications   Medication Sig    methylPREDNISolone (MEDROL DOSEPACK) 4 mg tablet Per dose pack instructions  Indications: inflammation of a tendon    rosuvastatin (CRESTOR) 5 mg tablet TAKE 1 TABLET BY MOUTH EVERY DAY AT NIGHT    escitalopram oxalate (Lexapro) 5 mg tablet Take 5 mg by mouth daily. Started by psychiatrist     ALPRAZolam (Xanax) 0.25 mg tablet Take 0.25 mg by mouth nightly. Per psychiatrist    cholecalciferol (Vitamin D3) (1000 Units /25 mcg) tablet Take 2,000 Units by mouth daily. No current facility-administered medications for this visit.        Past Medical History:   Diagnosis Date    Allergic reaction to nickel 2011    Anxiety 7/20/2011 1997; Psych Dr Todd Burnham (retired 2016), changed to new psychiatrist Dr. Bailee Duarte since spring 2018    Atrophic vaginitis 9/12/2019    2019 Gyn Dr [facility]    Chronic insomnia 4/16/2015    Colon polyp 5/19/2010    Controlled substance agreement signed 5/4/2017 5-2017    Dependent Edema of BLE 11/3/2017    Depression 5/19/2010 1997; Psych Dr Todd Burnham (retired 2016), changed to new psychiatrist Dr. Bailee Duarte since spring 2018    Family history of breast cancer 5/19/2010    Family history of diabetes mellitus (DM) 5/19/2010    H/O Post partum depression 7/20/2011    History of recurrent UTIs 9/12/2019    Urology eval ~ 8-2019: Cystoscopy and Bladder Scan, Negative, referred for Bladder PT, pt declined    Hypercholesterolemia 5/4/2017    Low back pain 2/1/2017    Xrays 5-2016, normal, did PT    Menopausal symptoms 4/3/2014    Gyn Dr IVELISSE BATES, pt declined HRT    Migraine 5/19/2010    Negative Screening for IHSS, +FHx 5/19/2010    Panic disorder 5/19/2010    Psych Dr Holden Gruber (retired 2016); 1997; Psych Dr Todd Burnham (retired 2016), changed to new psychiatrist Dr. Bailee Duarte since spring 2018    Postmenopause, LMP 48, No HRT 4/16/2015    Psoriasis     Recurrent cystitis 4/28/2016    Screening exams for skin cancer 5/4/2017    Per Derm Dr Donald Lance Vitamin D deficiency 7/25/2011        Past Surgical History:   Procedure Laterality Date    BIOPSY CERVIX  2003    negative    COLONOSCOPY  2002    benign polyp; Dr Andrea Riley    successful    HX COLONOSCOPY  5/2005    normal, f/u 10 yrs, Dr Nora Vargas  03/09/2018    polypectomy x 2, repeat 5 yrs, Dr. Allen Fam  2009    negative    HX GI  2001    Nissen Fundoplication for severe reflux    HX HEMORRHOIDECTOMY  3/2002    HX MALIGNANT SKIN LESION EXCISION  1994    BCC excised right chest    XR CHEST PA LAT  10/2008    normal       Family History   Problem Relation Age of Onset    Breast Cancer Mother 46    Cancer Mother          of lung cancer, 59; smoker    Cancer Father          of lung cancer, 76; smoker    Diabetes Father     Heart Disease Brother          IHSS age 30    Heart Attack Brother     Hypertension Brother     Hypertension Brother     High Cholesterol Brother         on medication    Breast Cancer Maternal Grandmother 48    Heart Disease Maternal Grandmother 61    Diabetes Paternal Grandmother     Heart Disease Paternal Grandmother          CHF 76    Stroke Paternal Grandfather 79    Breast Cancer Maternal Aunt 36    Diabetes Paternal Aunt     Diabetes Paternal Uncle     Other Son         Asperger's Syndrome        Social History     Tobacco Use    Smoking status: Never Smoker    Smokeless tobacco: Never Used   Vaping Use    Vaping Use: Never used   Substance Use Topics    Alcohol use: No     Comment: none since  but even prior to then was only occasionally    Drug use: No        Review of Systems   All other systems reviewed and are negative. ROS     Positive for: Musculoskeletal    Last edited by Naif Cantu on 2022  3:22 PM. (History)             Physical Exam    Right elbow has tenderness to both the medial and lateral epicondyles and pain was worsened with a combination of resisted elbow extension testing and forearm pronation testing. She seemed to have a negative Tinel's at the cubital tunnel no digital clicking or locking. No cyst or mass. Normal left elbow examination of the right wrist reveals no swelling, edema or warmth, no tenderness to palpation, no pain, normal strength and tone, normal range of motion, no crepitus, normal sensation, no instability or laxity and no known fractures or deformities.   Examination of the right hand reveals no tenderness to palpation, no pain, normal  strength, normal tone, normal range of motion, no crepitus, no instability or laxity, no known fractures or deformities and normal sensation. Imaging:    XR Results (most recent):  Results from Appointment encounter on 02/14/22    XR ELBOW RT MIN 3 V    Narrative  AP, lateral and oblique x-ray of the right elbow show normal osseous and joint space findings no evidence of fracture, calcification or arthritis. Slight osteopenia. ASSESSMENT/PLAN:  Below is the assessment and plan developed based on review of pertinent history, physical exam, labs, studies, and medications. The patient examination was clinically consistent with right elbow medial and lateral epicondylitis with a milder degree of overall hand arthritis. She states she is symptomatic from utilizing a leaf blower since October 2021. She was offered but deferred an injection but instead we will trial an oral steroid pack. She may trial outpatient therapy to assist with her recovery. She understands an MRI as a diagnostic option in the future. I plan to see her back in 4 to 6 weeks and reconsider the injection if needed. 1. Primary osteoarthritis, right hand  2. Right hand pain  3. Medial epicondylitis of right elbow  -     REFERRAL TO PHYSICAL THERAPY  -     XR ELBOW RT MIN 3 V; Future  -     methylPREDNISolone (MEDROL DOSEPACK) 4 mg tablet; Per dose pack instructions  Indications: inflammation of a tendon, Normal, Disp-1 Dose Pack, R-0  4. Lateral epicondylitis of right elbow  -     REFERRAL TO PHYSICAL THERAPY  -     XR ELBOW RT MIN 3 V; Future  -     methylPREDNISolone (MEDROL DOSEPACK) 4 mg tablet; Per dose pack instructions  Indications: inflammation of a tendon, Normal, Disp-1 Dose Pack, R-0      Return in about 4 weeks (around 3/14/2022). An electronic signature was used to authenticate this note.   -- Venu Cason MD

## 2022-02-14 ENCOUNTER — OFFICE VISIT (OUTPATIENT)
Dept: ORTHOPEDIC SURGERY | Age: 62
End: 2022-02-14
Payer: COMMERCIAL

## 2022-02-14 VITALS — HEIGHT: 66 IN | WEIGHT: 163 LBS | BODY MASS INDEX: 26.2 KG/M2

## 2022-02-14 DIAGNOSIS — M19.041 PRIMARY OSTEOARTHRITIS, RIGHT HAND: Primary | ICD-10-CM

## 2022-02-14 DIAGNOSIS — M77.01 MEDIAL EPICONDYLITIS OF RIGHT ELBOW: ICD-10-CM

## 2022-02-14 DIAGNOSIS — M79.641 RIGHT HAND PAIN: ICD-10-CM

## 2022-02-14 DIAGNOSIS — M77.11 LATERAL EPICONDYLITIS OF RIGHT ELBOW: ICD-10-CM

## 2022-02-14 PROCEDURE — 99203 OFFICE O/P NEW LOW 30 MIN: CPT | Performed by: ORTHOPAEDIC SURGERY

## 2022-02-14 RX ORDER — METHYLPREDNISOLONE 4 MG/1
TABLET ORAL
Qty: 1 DOSE PACK | Refills: 0 | Status: SHIPPED | OUTPATIENT
Start: 2022-02-14 | End: 2022-05-18 | Stop reason: ALTCHOICE

## 2022-02-14 NOTE — LETTER
2/14/2022    Patient: Tej Vargas   YOB: 1960   Date of Visit: 2/14/2022     Dao Hurley MD  [address] [address]  Via In Basket    Dear Dao Hurley MD,      Thank you for referring Ms. Hortencia Sargent to [facility] for evaluation. My notes for this consultation are attached. If you have questions, please do not hesitate to call me. I look forward to following your patient along with you.       Sincerely,    Elva Merida MD

## 2022-02-14 NOTE — PATIENT INSTRUCTIONS
Tennis Elbow: Exercises  Introduction  Here are some examples of exercises for you to try. The exercises may be suggested for a condition or for rehabilitation. Start each exercise slowly. Ease off the exercises if you start to have pain. You will be told when to start these exercises and which ones will work best for you. How to do the exercises  Wrist flexor stretch    1. Extend your arm in front of you with your palm up. 2. Bend your wrist, pointing your hand toward the floor. 3. With your other hand, gently bend your wrist farther until you feel a mild to moderate stretch in your forearm. 4. Hold for at least 15 to 30 seconds. Repeat 2 to 4 times. Wrist extensor stretch    1. Repeat steps 1 to 4 of the stretch above but begin with your extended hand palm down. Ball or sock squeeze    1. Hold a tennis ball (or a rolled-up sock) in your hand. 2. Make a fist around the ball (or sock) and squeeze. 3. Hold for about 6 seconds, and then relax for up to 10 seconds. 4. Repeat 8 to 12 times. 5. Switch the ball (or sock) to your other hand and do 8 to 12 times. Wrist deviation    1. Sit so that your arm is supported but your hand hangs off the edge of a flat surface, such as a table. 2. Hold your hand out like you are shaking hands with someone. 3. Move your hand up and down. 4. Repeat this motion 8 to 12 times. 5. Switch arms. 6. Try to do this exercise twice with each hand. Wrist curls    1. Place your forearm on a table with your hand hanging over the edge of the table, palm up. 2. Place a 1- to 2-pound weight in your hand. This may be a dumbbell, a can of food, or a filled water bottle. 3. Slowly raise and lower the weight while keeping your forearm on the table and your palm facing up. 4. Repeat this motion 8 to 12 times. 5. Switch arms, and do steps 1 through 4.  6. Repeat with your hand facing down toward the floor. Switch arms. Biceps curls    1.  Sit leaning forward with your legs slightly spread and your left hand on your left thigh. 2. Place your right elbow on your right thigh, and hold the weight with your forearm horizontal.  3. Slowly curl the weight up and toward your chest.  4. Repeat this motion 8 to 12 times. 5. Switch arms, and do steps 1 through 4. Follow-up care is a key part of your treatment and safety. Be sure to make and go to all appointments, and call your doctor if you are having problems. It's also a good idea to know your test results and keep a list of the medicines you take. Where can you learn more? Go to http://www.rodríguez.com/  Enter G343 in the search box to learn more about \"Tennis Elbow: Exercises. \"  Current as of: July 1, 2021               Content Version: 13.0  © 6938-1344 [employer]. Care instructions adapted under license by [employer] (which disclaims liability or warranty for this information). If you have questions about a medical condition or this instruction, always ask your healthcare professional. [address] any warranty or liability for your use of this information. Elbow: Exercises  Introduction  Here are some examples of exercises for you to try. The exercises may be suggested for a condition or for rehabilitation. Start each exercise slowly. Ease off the exercises if you start to have pain. You will be told when to start these exercises and which ones will work best for you. How to do the exercises  Wrist flexor stretch    5. Extend your arm in front of you with your palm up. 6. Bend your wrist, pointing your hand toward the floor. 7. With your other hand, gently bend your wrist farther until you feel a mild to moderate stretch in your forearm. 8. Hold for at least 15 to 30 seconds. Repeat 2 to 4 times. Wrist extensor stretch    2. Repeat steps 1 to 4 of the stretch above but begin with your extended hand palm down. Ball or sock squeeze    6.  Hold a tennis ball (or a rolled-up sock) in your hand. 7. Make a fist around the ball (or sock) and squeeze. 8. Hold for about 6 seconds, and then relax for up to 10 seconds. 9. Repeat 8 to 12 times. 10. Switch the ball (or sock) to your other hand and do 8 to 12 times. Wrist deviation    7. Sit so that your arm is supported but your hand hangs off the edge of a flat surface, such as a table. 8. Hold your hand out like you are shaking hands with someone. 9. Move your hand up and down. 10. Repeat this motion 8 to 12 times. 11. Switch arms. 12. Try to do this exercise twice with each hand. Wrist curls    7. Place your forearm on a table with your hand hanging over the edge of the table, palm up. 8. Place a 1- to 2-pound weight in your hand. This may be a dumbbell, a can of food, or a filled water bottle. 9. Slowly raise and lower the weight while keeping your forearm on the table and your palm facing up. 10. Repeat this motion 8 to 12 times. 11. Switch arms, and do steps 1 through 4.  12. Repeat with your hand facing down toward the floor. Switch arms. Biceps curls    6. Sit leaning forward with your legs slightly spread and your left hand on your left thigh. 7. Place your right elbow on your right thigh, and hold the weight with your forearm horizontal.  8. Slowly curl the weight up and toward your chest.  9. Repeat this motion 8 to 12 times. 10. Switch arms, and do steps 1 through 4. Follow-up care is a key part of your treatment and safety. Be sure to make and go to all appointments, and call your doctor if you are having problems. It's also a good idea to know your test results and keep a list of the medicines you take. Where can you learn more? Go to http://www.gray.com/  Enter M279 in the search box to learn more about \"Elbow: Exercises. \"  Current as of: July 1, 2021               Content Version: 13.0  © 1701-4517 [employer].    Care instructions adapted under license by [employer] (which disclaims liability or warranty for this information). If you have questions about a medical condition or this instruction, always ask your healthcare professional. [address] any warranty or liability for your use of this information.

## 2022-03-03 ENCOUNTER — OFFICE VISIT (OUTPATIENT)
Dept: ORTHOPEDIC SURGERY | Age: 62
End: 2022-03-03
Payer: COMMERCIAL

## 2022-03-03 DIAGNOSIS — M77.11 LATERAL EPICONDYLITIS OF RIGHT ELBOW: Primary | ICD-10-CM

## 2022-03-03 DIAGNOSIS — M77.01 MEDIAL EPICONDYLITIS OF RIGHT ELBOW: ICD-10-CM

## 2022-03-03 PROCEDURE — 97110 THERAPEUTIC EXERCISES: CPT | Performed by: PHYSICAL THERAPIST

## 2022-03-03 PROCEDURE — 97162 PT EVAL MOD COMPLEX 30 MIN: CPT | Performed by: PHYSICAL THERAPIST

## 2022-03-03 NOTE — PROGRESS NOTES
Najma Palacios (: 1960) is a 64 y.o. female patient here for evaluation of the following chief complaint(s):  Elbow Pain       Patient Name: Najma Palacios  TPHW:[id number]  : 1960  [x]  Patient  Verified  Payor: Dahiana Mooney / Plan: [address] [employer] [employer] Se HMO / Product Type: HMO /    In time:12:00  Out time:1:00  Total Treatment Time (min): 60  Total Timed Codes (min): 50  1:1 Treatment Time ( [address] only): NA   Visit #:     SUBJECTIVE/OBJECTIVE:  HPI    Patient is seen today to evaluate her right arm. She has complained of pain since 2021. She recalls she was blowing leaves when she later experienced pain to the lateral aspect of her right elbow more than medially. She does report in the past she has experienced some tingling into the hand but this has stopped. 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 She complains that the pain prevents her from handling simple objects. She is right dominant. She does not experience constant discomfort. Some days that she can go about her daily activities and not even noticed her elbow. She has tried treatment modalities including ice, heat, soaks and ibuprofen without long-term relief. She does report that sometimes it is difficult to walk due to elbow extension that aggravates pain. She does have a 3year old puppy that keeps her very active.      Physical Exam    Range of motion: right  Elbow flexion-full, pain-free  Elbow extension -full, mild discomfort with end range extension  Wrist flex-full  Wrist ext-full  Supination-full  Pronation-full    MMT- right   Elbow flex/ext (C6/7)-5/5  Wrist flex/ext (C7/6)-5/5, increased lateral elbow pain, at the lateral epicondyle, with resisted wrist extension  Ulnar/radial deviation-5/5  Thumb ext (C8)-5/5  Hand intrinsics (T1)-not tested    MMT- left  Elbow flex/ext (C6/7)-5/5  Wrist flex/ext (C7/6)-5/5  Ulnar/radial deviation-not tested  Thumb ext (C8)-not tested  Hand intrinsics (T1)-not tested    Flexibility: The patient is restricted into the forearm extensors on the right    Sensation  Dermatomes-all dermatomes intact    Functional mobility: Full range of motion of the right elbow and wrist    Palpation: tender to palpate of the lateral epicondyles on the right, forearm extensors    Functional Outcome measure: Quick DASH- 25, mild    Manual: Soft tissue massage/release of the right forearm extensors, cross friction massage to the lateral epicondyle- 10 minutes    Therapeutic exercise performed: Wrist stretching into extension, cross friction lateral epicondyle, soft tissue release with the lacrosse ball in the forearm extensors, and 3x10 flex bar extension eccentrics. 20 min exercise. An electronic signature was used to authenticate this note. -- Sanjana Johnson PT       ASSESSMENT/PLAN:  Below is the assessment and plan developed based on review of pertinent history, physical exam, labs, studies, and medications. 1. Lateral epicondylitis of right elbow  2. Medial epicondylitis of right elbow      Return in about 11 days (around 3/14/2022). Breanna Ball is a 64 y.o. F presenting with mostly right lateral elbow pain, onset in October 2021. She currently has the following impairments:   increased pain with resisted wrist extension and elbow extension, increased tenderness to palpation along the wrist extensors and lateral epicondyle, loss of  strength/inability to perform daily activities which require gripping movement with the right hand, intermittent tingling into the right hand, increased difficulty/pain with pushing, pulling, lifting, opening jars, opening a door with the right hand, and carrying objects; and inability to perform recreational activities, including walking. The patient would benefit from skilled therapy services in order to address the above impairments to allow for full functional return to daily activity and recreation.  Reviewed a home program with the patient, focus initially will be given to addressing pain, as well as improving mobility of the  right forearm and improve tolerance to wrist movement. The patient appeared to understand the plan of care and was in agreement. Will follow up with the doctor in 4-6 weeks, pending symptom improvement. She will attend physical therapy 1-2 days per week until that time to work on the above impairments. Goals  to be met in 4-6 weeks:  1. The patient will be able to achieve full  passive and active right wrist range of motion without lateral elbow pain  2. The patient will be able to go for a 30-minute walk without report of increased lateral elbow pain  3. The patient will be able to demonstrate an ability to open a jar and door, push, pull without weakness and/or pain  4. The patient will be independent with a home exercise program    An electronic signature was used to authenticate this note.   -- Jayda Cyr, PT

## 2022-03-14 ENCOUNTER — OFFICE VISIT (OUTPATIENT)
Dept: ORTHOPEDIC SURGERY | Age: 62
End: 2022-03-14
Payer: COMMERCIAL

## 2022-03-14 DIAGNOSIS — M77.11 LATERAL EPICONDYLITIS OF RIGHT ELBOW: Primary | ICD-10-CM

## 2022-03-14 DIAGNOSIS — M77.01 MEDIAL EPICONDYLITIS OF RIGHT ELBOW: ICD-10-CM

## 2022-03-14 DIAGNOSIS — M79.641 RIGHT HAND PAIN: ICD-10-CM

## 2022-03-14 PROCEDURE — 97110 THERAPEUTIC EXERCISES: CPT | Performed by: PHYSICAL THERAPIST

## 2022-03-14 PROCEDURE — 97140 MANUAL THERAPY 1/> REGIONS: CPT | Performed by: PHYSICAL THERAPIST

## 2022-03-14 NOTE — PROGRESS NOTES
Emir Monroe (: 1960) is a 64 y.o. female patient here for evaluation of the following chief complaint(s):  Elbow Pain       Patient Name: Emir Monroe  Date:3/14/2022  : 1960  [x]  Patient  Verified  Payor: Pooja Adjutant / Plan: [address] [employer] Se HMO / Product Type: HMO /    In time: 9:00  Out time:9:50  Total Treatment Time (min): 50  Total Timed Codes (min): 45  1:1 Treatment Time ( only): NA   Visit #: 2  30      ASSESSMENT/PLAN:  Below is the assessment and plan developed based on review of pertinent history, physical exam, labs, studies, and medications. 1. Lateral epicondylitis of right elbow  2. Medial epicondylitis of right elbow  3. Right hand pain      Return in about 1 week (around 3/21/2022) for continued therapy for elbow. The patient is doing better overall. She has more tenderness towards the distal triceps than in the wrist extensors. There is less tenderness overall to cross friction to the lateral epicondyle. She is able to progress today with concentric wrist strengthening, this did not cause any increased discomfort in the lateral epicondyle region. Instructed on an updated program, the patient will follow up next week. SUBJECTIVE/OBJECTIVE:  HPI    Patient ports that overall she is doing better. She is able to  objects/open doors with less discomfort overall. She has been using her ice pack at home. Reports more pinpoint pain versus diffuse pain currently. Physical Exam    Objective:    ROM: Full into wrist flexion and elbow flexion and extension    Function/Strength:  The patient is 5/5 strength into resisted wrist extension and flexion, resisted elbow flexion and extension on the right    Manual performed: Soft tissue release triceps and forearm extensor region on the right; cross friction lateral epicondyle (15 min)    Therapeutic exercise performed: Flex bar eccentric curls, wrist curls with 3 pound weight, shoulder T's, wrist flexor and extensor stretching. 30 minutes exercise. An electronic signature was used to authenticate this note.   -- Elder Bernard, PT

## 2022-03-19 PROBLEM — N95.2 ATROPHIC VAGINITIS: Status: ACTIVE | Noted: 2019-09-12

## 2022-03-19 PROBLEM — R60.9 DEPENDENT EDEMA: Status: ACTIVE | Noted: 2017-11-03

## 2022-03-19 PROBLEM — E78.00 HYPERCHOLESTEROLEMIA: Status: ACTIVE | Noted: 2017-05-04

## 2022-03-19 PROBLEM — R31.0 GROSS HEMATURIA: Status: ACTIVE | Noted: 2019-10-01

## 2022-03-19 PROBLEM — Z87.440 HISTORY OF RECURRENT UTIS: Status: ACTIVE | Noted: 2019-09-12

## 2022-03-19 PROBLEM — Z12.83 SCREENING EXAM FOR SKIN CANCER: Status: ACTIVE | Noted: 2017-05-04

## 2022-03-19 PROBLEM — M54.50 LOW BACK PAIN: Status: ACTIVE | Noted: 2017-02-01

## 2022-03-20 PROBLEM — Z79.899 CONTROLLED SUBSTANCE AGREEMENT SIGNED: Status: ACTIVE | Noted: 2017-05-04

## 2022-03-22 ENCOUNTER — OFFICE VISIT (OUTPATIENT)
Dept: ORTHOPEDIC SURGERY | Age: 62
End: 2022-03-22
Payer: COMMERCIAL

## 2022-03-22 DIAGNOSIS — M77.01 MEDIAL EPICONDYLITIS OF RIGHT ELBOW: ICD-10-CM

## 2022-03-22 DIAGNOSIS — M77.11 LATERAL EPICONDYLITIS OF RIGHT ELBOW: Primary | ICD-10-CM

## 2022-03-22 PROCEDURE — 97110 THERAPEUTIC EXERCISES: CPT | Performed by: PHYSICAL THERAPIST

## 2022-03-22 PROCEDURE — 97140 MANUAL THERAPY 1/> REGIONS: CPT | Performed by: PHYSICAL THERAPIST

## 2022-03-22 NOTE — PROGRESS NOTES
Rhonda Subramanian (: 1960) is a 64 y.o. female patient here for evaluation of the following chief complaint(s):  Elbow Pain       Patient Name: Rhonda Subramanian  Date:3/22/2022  : 1960  [x]  Patient  Verified  Payor: Noah Holcombbury / Plan: 55 [employer] Se HMO / Product Type: HMO /    In time: 10:00  Out time: 11:00  Total Treatment Time (min): 60  Total Timed Codes (min): 45  1:1 Treatment Time ([facility] only): NA   Visit #: 3 of 30      ASSESSMENT/PLAN:  Below is the assessment and plan developed based on review of pertinent history, physical exam, labs, studies, and medications. 1. Lateral epicondylitis of right elbow  2. Medial epicondylitis of right elbow      Return in about 1 week (around 3/29/2022). The patient is doing better overall. She exhibits less tenderness towards the distal triceps and wrist extensors and there is less tenderness overall to cross friction to the lateral epicondyle. She is able to continue to progress today with concentric wrist strengthening and pronation/supination, this did not cause any increased discomfort in the lateral epicondyle region. Instructed on an updated program, the patient will follow up next week and we will discuss the plan of care at that time. SUBJECTIVE/OBJECTIVE:  HPI    Patient reports that overall she is doing much. She was able to plant in her garden and perform other yard work yesterday without discomfort. She did have some soreness this morning but overall much better. She is able to  objects, open doors, and carry groceries with less discomfort overall. Physical Exam    Objective:    ROM: Full into wrist flexion and elbow flexion and extension    Function/Strength:  The patient is 5/5 strength into resisted wrist extension and flexion, resisted elbow flexion and extension on the right    Manual performed: Soft tissue release triceps and forearm extensor region on the right (in supine position); cross friction lateral epicondyle (15 min)    Therapeutic exercise performed: putty extensions, PVC pronation/supination, wrist curls with 4 pound weight, shoulder T's, wrist flexor and extensor stretching. 30 minutes exercise. Ice to the right elbow for 10 minutes. An electronic signature was used to authenticate this note.   -- Jacoby Cuevas, PT

## 2022-04-26 ENCOUNTER — TELEPHONE (OUTPATIENT)
Dept: FAMILY MEDICINE CLINIC | Age: 62
End: 2022-04-26

## 2022-04-26 NOTE — TELEPHONE ENCOUNTER
Patient called stated nurse called her and told her she need to make appointment would like for the nurse to call her before she makes appointment.     Requesting a call back    Best call back #[phone]

## 2022-04-26 NOTE — TELEPHONE ENCOUNTER
LVM of need to set up fasting f/u appt    Left another message for return call.     Pt is already scheduled lab only on 5/13 and a CPE on 5/18

## 2022-05-11 DIAGNOSIS — E78.00 HYPERCHOLESTEROLEMIA: ICD-10-CM

## 2022-05-11 RX ORDER — ROSUVASTATIN CALCIUM 5 MG/1
5 TABLET, COATED ORAL
Qty: 30 TABLET | Refills: 0 | Status: SHIPPED | OUTPATIENT
Start: 2022-05-11 | End: 2022-06-13

## 2022-05-13 ENCOUNTER — APPOINTMENT (OUTPATIENT)
Dept: FAMILY MEDICINE CLINIC | Age: 62
End: 2022-05-13

## 2022-05-14 LAB
25(OH)D3+25(OH)D2 SERPL-MCNC: 29.8 NG/ML (ref 30–100)
ALBUMIN SERPL-MCNC: 4.4 G/DL (ref 3.8–4.8)
ALBUMIN/GLOB SERPL: 1.8 {RATIO} (ref 1.2–2.2)
ALP SERPL-CCNC: 63 IU/L (ref 44–121)
ALT SERPL-CCNC: 21 IU/L (ref 0–32)
AST SERPL-CCNC: 17 IU/L (ref 0–40)
BILIRUB SERPL-MCNC: 0.4 MG/DL (ref 0–1.2)
BUN SERPL-MCNC: 12 MG/DL (ref 8–27)
BUN/CREAT SERPL: 15 (ref 12–28)
CALCIUM SERPL-MCNC: 9.6 MG/DL (ref 8.7–10.3)
CHLORIDE SERPL-SCNC: 102 MMOL/L (ref 96–106)
CHOLEST SERPL-MCNC: 180 MG/DL (ref 100–199)
CO2 SERPL-SCNC: 24 MMOL/L (ref 20–29)
CREAT SERPL-MCNC: 0.82 MG/DL (ref 0.57–1)
EGFR: 81 ML/MIN/1.73
ERYTHROCYTE [DISTWIDTH] IN BLOOD BY AUTOMATED COUNT: 12.1 % (ref 11.7–15.4)
GLOBULIN SER CALC-MCNC: 2.5 G/DL (ref 1.5–4.5)
GLUCOSE SERPL-MCNC: 88 MG/DL (ref 65–99)
HCT VFR BLD AUTO: 43.1 % (ref 34–46.6)
HDLC SERPL-MCNC: 82 MG/DL
HGB BLD-MCNC: 14.4 G/DL (ref 11.1–15.9)
IMP & REVIEW OF LAB RESULTS: NORMAL
LDLC SERPL CALC-MCNC: 89 MG/DL (ref 0–99)
MCH RBC QN AUTO: 32.1 PG (ref 26.6–33)
MCHC RBC AUTO-ENTMCNC: 33.4 G/DL (ref 31.5–35.7)
MCV RBC AUTO: 96 FL (ref 79–97)
PLATELET # BLD AUTO: 275 X10E3/UL (ref 150–450)
POTASSIUM SERPL-SCNC: 5.2 MMOL/L (ref 3.5–5.2)
PROT SERPL-MCNC: 6.9 G/DL (ref 6–8.5)
RBC # BLD AUTO: 4.48 X10E6/UL (ref 3.77–5.28)
SODIUM SERPL-SCNC: 139 MMOL/L (ref 134–144)
TRIGL SERPL-MCNC: 45 MG/DL (ref 0–149)
TSH SERPL DL<=0.005 MIU/L-ACNC: 1.36 UIU/ML (ref 0.45–4.5)
VLDLC SERPL CALC-MCNC: 9 MG/DL (ref 5–40)
WBC # BLD AUTO: 3.5 X10E3/UL (ref 3.4–10.8)

## 2022-05-15 NOTE — TELEPHONE ENCOUNTER
Labs pre-ordered for review at upcoming appointment with PCP:  Future Appointments  Date Time Provider Lyndsey Leahy  5/18/2022  1:15 PM Lexy Parker MD [facility]

## 2022-05-18 ENCOUNTER — OFFICE VISIT (OUTPATIENT)
Dept: FAMILY MEDICINE CLINIC | Age: 62
End: 2022-05-18
Payer: COMMERCIAL

## 2022-05-18 VITALS
DIASTOLIC BLOOD PRESSURE: 68 MMHG | OXYGEN SATURATION: 97 % | TEMPERATURE: 97.6 F | HEIGHT: 65 IN | BODY MASS INDEX: 28.49 KG/M2 | WEIGHT: 171 LBS | SYSTOLIC BLOOD PRESSURE: 102 MMHG | RESPIRATION RATE: 16 BRPM | HEART RATE: 80 BPM

## 2022-05-18 DIAGNOSIS — E78.00 HYPERCHOLESTEROLEMIA: ICD-10-CM

## 2022-05-18 DIAGNOSIS — Z00.00 ROUTINE GENERAL MEDICAL EXAMINATION AT A HEALTH CARE FACILITY: Primary | ICD-10-CM

## 2022-05-18 DIAGNOSIS — E55.9 VITAMIN D DEFICIENCY: ICD-10-CM

## 2022-05-18 DIAGNOSIS — Z01.84 IMMUNITY STATUS TESTING: ICD-10-CM

## 2022-05-18 PROCEDURE — 99396 PREV VISIT EST AGE 40-64: CPT | Performed by: FAMILY MEDICINE

## 2022-05-18 NOTE — PROGRESS NOTES
Chief Complaint   Patient presents with    Complete Physical     Fasting labs done 5/13/22       HISTORY OF PRESENT ILLNESS   HPI  Annual CPE  History of hypercholesterolemia and had fasting labs done 5/13/22  Complying routinely w/ Crestor 5 mg daily and tolerating w/o reaction or side effects  Diet: nothing special, eats lots of sweets and eating a lot of junk food lately, since ~ 2020 did cut out milk she used to drink a lot of; eats [place] yogurt instead   Exercise: walks 1-1.5 miles a day (was up to 2 miles until she got a new puppy who slows her down now), stays very active in her yard and garden as well  Caffeine: 1 cup of coffee qam  Weight: 160's x years, currently on the higher end    She would like to get shingles vaccine but is fearful bc she doesn't think she ever had chicken pox. Her  got the shingles vaccine and got really ill from it. Patient would like to get the vaccine eventually but wants to make sure she had chicken pox before. Also about 2-3 weeks ago she broke out into an itchy, painful, burning red rash w/ blisters and oozing. It was localized to anterior right shoulder. She also had an itchy burning sensation on her right upper back but  never saw a rash. She was also feeling achy and tired during that time. She has been applying cortisone. It has calmed down. It looks and sounds like shingles. So if her titre is +, I would still recommend she wait about 3 months before getting shingles vaccine. Also she has been seeing her psychiatrist routinely for h/o depression and anxiety. She has been really well controlled and doing great on low dose Lexapro 5 mg daily since 1-2020. She used to be on much higher dose of Xanax at night for anxiety related insomnia. Since retiring and also being on Lexapro, she has been able to wean this down now to 0.25 mg and even recently has been able to only taking maybe 4 nights a week.  She last had an appt w/ her psychiatrist about a week ago who advised her he is leaving and moving to another area. He gave her a 90 day supply of the Lexapro to last her until this August.   While she is in search of a new psychiatrist she is asking if I will resume management of it until then since she has been so stable. Her psychiatrist said he would be happy to submit a note if needed. I advised her I am happy to renew the Lexapro this fall if she is still waiting to see a new psychiatrist by that time but advised her to continue weaning the Xanax since I wont be taking over controlled medication. She is agreeable and states she has done really well w/ tapering off this over time anyway. She will keep me posted. REVIEW OF SYMPTOMS   Review of Systems   Constitutional: Negative. HENT: Negative. Eyes: Negative. Respiratory: Negative. Cardiovascular: Negative. Gastrointestinal: Negative. Genitourinary: Negative. Musculoskeletal: Negative. Neurological: Negative. Endo/Heme/Allergies: Negative. Psychiatric/Behavioral: Negative.             PROBLEM LIST/MEDICAL HISTORY     Problem List  Date Reviewed: 5/18/2022          Codes Class Noted    Gross hematuria ICD-10-CM: R31.0  ICD-9-CM: 599.71  10/1/2019    Overview Signed 10/1/2019 11:03 PM by Romel Robles MD     Urology eval ~ 8-2019: Cystoscopy and Bladder Scan, Negative; CT 9-2019: negative             History of recurrent UTIs ICD-10-CM: Z87.440  ICD-9-CM: V13.02  9/12/2019    Overview Addendum 7/28/2021  2:20 PM by Romel Robles MD     Urology eval ~ 21 : Cystoscopy and Bladder Scan, Negative, referred for Bladder PT             Atrophic vaginitis ICD-10-CM: N95.2  ICD-9-CM: 627.3  9/12/2019    Overview Signed 9/12/2019  8:52 AM by Romel Robles MD     2019 Gyn Dr Dumont Dilip             Dependent Edema of BLE ICD-10-CM: R60.9  ICD-9-CM: 782.3  11/3/2017        Controlled substance agreement signed ICD-10-CM: I57.544  ICD-9-CM: V58.69  5/4/2017 Overview Addendum 3/13/2018  3:14 PM by Keisha Rose MD     [id number], 3-2018             Hypercholesterolemia ICD-10-CM: E78.00  ICD-9-CM: 272.0  5/4/2017        Screening exams for skin cancer ICD-10-CM: Z12.83  ICD-9-CM: V76.43  5/4/2017    Overview Signed 5/4/2017  1:07 PM by Keisha Rose MD     Per Derm Dr Belinda Damon             Low back pain ICD-10-CM: M54.50  ICD-9-CM: 724.2  2/1/2017    Overview Signed 2/1/2017  8:33 AM by Keisha Rose MD     Xrays [id number], normal, did PT             Recurrent cystitis ICD-10-CM: N30.90  ICD-9-CM: 595.9  4/28/2016    Overview Signed 3/13/2018  3:29 PM by Keisha Rose MD      2014             Chronic insomnia ICD-10-CM: F51.04  ICD-9-CM: 780.52  4/16/2015    Overview Addendum 4/28/2016 11:45 AM by Keisha Rose MD     Psych rx'd Lara Joiner worked really well, but insurance stopped covering             Postmenopause, LMP 48, No HRT ICD-10-CM: Z78.0  ICD-9-CM: V49.81  4/16/2015        Menopausal symptoms ICD-10-CM: N95.1  ICD-9-CM: 627.2  4/3/2014    Overview Signed 4/3/2014 12:01 PM by Keisha Rose MD     Gyn Dr Bakari Garcia, pt declined HRT             Vitamin D deficiency ICD-10-CM: E55.9  ICD-9-CM: 268.9  7/25/2011    Overview Signed 7/25/2011 10:54 PM by Keisha Rose MD     7/2011             Anxiety ICD-10-CM: F41.9  ICD-9-CM: 300.00  7/20/2011    Overview Addendum 5/21/2021  9:42 AM by Keisha Rose MD     1997; Psych Dr Gilson Mcnamara (retired 2016), changed to new psychiatrist Dr. Jj Edmonds since spring 2018, changed again to new psychiatrist Dr. Ken Krishnan             H/O Post partum depression ICD-10-CM: F53.0  ICD-9-CM: 648.44, 60 530 49 87  7/20/2011    Overview Signed 7/20/2011  9:14 AM by Keisha Rose MD     1997             Psoriasis ICD-10-CM: L40.9  ICD-9-CM: 696.1  7/20/2011    Overview Signed 7/20/2011  9:18 AM by MD Vic Dunn Dr Dears Allergic reaction to nickel ICD-10-CM: L23.0  ICD-9-CM: 692.83  7/20/2011        Panic disorder ICD-10-CM: F41.0  ICD-9-CM: 300.01  5/19/2010    Overview Addendum 5/17/2019  9:36 AM by Nicho Quijano MD     Psych Dr Merlin Pour (retired 2016); 1997; Psych Dr Spenser Johnson (retired 2016), changed to new psychiatrist Dr. Delfina Mcfadden since spring 2018             Depression ICD-10-CM: [address] [facility]. A  ICD-9-CM: 212  5/19/2010    Overview Addendum 5/17/2019  9:35 AM by Nicho Quijano MD     1684; Psych Dr Spenser Johnson (retired 2016), changed to new psychiatrist Dr. Delfina Mcfadden since spring 2018             Colon polyp ICD-10-CM: K63.5  ICD-9-CM: 211.3  5/19/2010    Overview Signed 5/19/2010 10:06 AM by Malik Trent     Benign              Family history of breast cancer ICD-10-CM: Z80.3  ICD-9-CM: V16.3  5/19/2010        Family history of diabetes mellitus (DM) ICD-10-CM: Z83.3  ICD-9-CM: V18.0  5/19/2010        Migraine ICD-10-CM: U59.725  ICD-9-CM: 346.90  5/19/2010    Overview Addendum 4/3/2014 11:40 AM by Nicho Quijano MD     4/04;  Acute migraine w/ neuro changes summer 2013, [facility] ER, CT scan negative             Screening for cardiovascular condition ICD-10-CM: Z13.6  ICD-9-CM: V81.2  5/19/2010    Overview Signed 5/19/2010 10:09 AM by Malik Trent     For IHSS (negative)                       PAST SURGICAL HISTORY     Past Surgical History:   Procedure Laterality Date    BIOPSY CERVIX  2003    negative    COLONOSCOPY  2002    benign polyp; Dr Sunny Milian    successful    HX COLONOSCOPY  5/2005    normal, f/u 10 yrs, Dr Nakul Lynne HX COLONOSCOPY  03/09/2018    polypectomy x 2, repeat 5 yrs, Dr. Mookie Felipe  2009    negative    HX GI  2001    Nissen Fundoplication for severe reflux    HX HEMORRHOIDECTOMY  3/2002    HX MALIGNANT SKIN LESION EXCISION  1994    BCC excised right chest    XR CHEST PA LAT  10/2008    normal         MEDICATIONS Current Outpatient Medications   Medication Sig    rosuvastatin (CRESTOR) 5 mg tablet TAKE 1 TABLET BY MOUTH NIGHTLY. TO COVER UNTIL APPT    escitalopram oxalate (Lexapro) 5 mg tablet Take 5 mg by mouth daily. Started by psychiatrist 1-2020    ALPRAZolam (Xanax) 0.25 mg tablet Take 0.25 mg by mouth nightly as needed. Per psychiatrist, has tapered down to . 25 mg 3-4 nights a week    cholecalciferol, vitamin D3, (Vitamin D3) 50 mcg (2,000 unit) tab Take 2,000 Units by mouth daily. No current facility-administered medications for this visit.           ALLERGIES     Allergies   Allergen Reactions    Latex Other (comments)    Adhesive Rash    Ambien [Zolpidem] Other (comments)     Didn't like, vivid dreams    Ciprofloxacin Nausea Only     Nausea, achy, felt really bad    Demerol [Meperidine] Other (comments)     GI    Morphine Other (comments)     Chest pain    Nickel Rash          SOCIAL HISTORY     Social History     Tobacco Use    Smoking status: Never Smoker    Smokeless tobacco: Never Used   Substance Use Topics    Alcohol use: No     Comment: very seldom anymore; had really cut it out since 1-2020 but even prior to then was only occasionally     Social History     Social History Narrative    , retired dental hygienist, 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    Diet: nothing special, eats lots of sweets and eating a lot of junk food lately, since ~ 2020 did cut out milk she used to drink a lot of; eats [place] yogurt instead     Exercise: walks 1-1.5 miles a day (was up to 2 miles until she got a new puppy who slows her down now), stays very active in her yard and garden as well    Caffeine: 1 cup of coffee qam    Weight: 160's x years         Social History     Substance and Sexual Activity   Sexual Activity Yes    Partners: Male       IMMUNIZATIONS     Immunization History   Administered Date(s) Administered    COVID-19, Pfizer Purple top, DILUTE for use, 12+ yrs, 30mcg/0.3mL dose 04/08/2021, 04/29/2021, 2021    Influenza Nasal Vaccine 2014    Influenza Vaccine 2018    Influenza Vaccine (Quad) Mdck Pf (>2 Yrs Flucelvax QUAD [id number]) 2019    Influenza Vaccine (Quad) PF (>6 Mo Flulaval, Fluarix, and >3 Yrs Marisa Dais [id number]) 2018    Tdap 2015         FAMILY HISTORY     Family History   Problem Relation Age of Onset    Breast Cancer Mother 46    Cancer Mother          of lung cancer, 59; smoker    Cancer Father          of lung cancer, 76; smoker    Diabetes Father     Heart Disease Brother          IHSS age 32    Heart Attack Brother     Hypertension Brother     Hypertension Brother     High Cholesterol Brother         on medication    Breast Cancer Maternal Grandmother 48    Heart Disease Maternal Grandmother 61    Diabetes Paternal Grandmother     Heart Disease Paternal Grandmother          CHF 76    Stroke Paternal Grandfather 79    Breast Cancer Maternal Aunt 36    Diabetes Paternal Aunt     Diabetes Paternal Uncle     Other Son         Asperger's Syndrome         VITALS     Visit Vitals  /68 (BP 1 Location: Left upper arm, BP Patient Position: Sitting, BP Cuff Size: Large adult)   Pulse 80   Temp 97.6 °F (36.4 °C) (Oral)   Resp 16   Ht 5' 5\" (1.651 m)   Wt 171 lb (77.6 kg)   LMP 2011   SpO2 97%   BMI 28.46 kg/m²          PHYSICAL EXAMINATION   Physical Exam  Vitals reviewed. Constitutional:       Appearance: Normal appearance. HENT:      Right Ear: Tympanic membrane normal.      Left Ear: Tympanic membrane normal.   Eyes:      Conjunctiva/sclera: Conjunctivae normal.   Neck:      Thyroid: No thyroid mass, thyromegaly or thyroid tenderness. Vascular: No carotid bruit. Cardiovascular:      Rate and Rhythm: Normal rate and regular rhythm. Heart sounds: Normal heart sounds. Pulmonary:      Effort: Pulmonary effort is normal.      Breath sounds: Normal breath sounds.    Abdominal:      General: There is no distension. Palpations: Abdomen is soft. Tenderness: There is no abdominal tenderness. Musculoskeletal:         General: No swelling or tenderness. Cervical back: Neck supple. Right lower leg: No edema. Left lower leg: No edema. Lymphadenopathy:      Cervical: No cervical adenopathy. Neurological:      General: No focal deficit present. Mental Status: She is alert and oriented to person, place, and time. Mental status is at baseline. Cranial Nerves: No cranial nerve deficit. Psychiatric:         Mood and Affect: Mood and affect normal.         Behavior: Behavior normal.                LABORATORY DATA/ANCILLARY/IMAGING     Results for orders placed or performed in visit on 01/10/22   CBC W/O DIFF   Result Value Ref Range    WBC 3.5 3.4 - 10.8 x10E3/uL    RBC 4.48 3.77 - 5.28 x10E6/uL    HGB 14.4 11.1 - 15.9 g/dL    HCT 43.1 34.0 - 46.6 %    MCV 96 79 - 97 fL    MCH 32.1 26.6 - 33.0 pg    MCHC 33.4 31.5 - 35.7 g/dL    RDW 12.1 11.7 - 15.4 %    PLATELET 829 693 - 759 [id number]   METABOLIC PANEL, COMPREHENSIVE   Result Value Ref Range    Glucose 88 65 - 99 mg/dL    BUN 12 8 - 27 mg/dL    Creatinine 0.82 0.57 - 1.00 mg/dL    eGFR 81 >59 mL/min/1.73    BUN/Creatinine ratio 15 12 - 28    Sodium 139 134 - 144 mmol/L    Potassium 5.2 3.5 - 5.2 mmol/L    Chloride 102 96 - 106 mmol/L    CO2 24 20 - 29 mmol/L    Calcium 9.6 8.7 - 10.3 mg/dL    Protein, total 6.9 6.0 - 8.5 g/dL    Albumin 4.4 3.8 - 4.8 g/dL    GLOBULIN, TOTAL 2.5 1.5 - 4.5 g/dL    A-G Ratio 1.8 1.2 - 2.2    Bilirubin, total 0.4 0.0 - 1.2 mg/dL    Alk.  phosphatase 63 44 - 121 IU/L    AST (SGOT) 17 0 - 40 IU/L    ALT (SGPT) 21 0 - 32 IU/L   LIPID PANEL   Result Value Ref Range    Cholesterol, total 180 100 - 199 mg/dL    Triglyceride 45 0 - 149 mg/dL    HDL Cholesterol 82 >39 mg/dL    VLDL, calculated 9 5 - 40 mg/dL    LDL, calculated 89 0 - 99 mg/dL   TSH 3RD GENERATION   Result Value Ref Range    TSH 1.360 0.450 - 4.500 uIU/mL   VITAMIN D, 25 HYDROXY   Result Value Ref Range    VITAMIN D, 25-HYDROXY 29.8 (L) 30.0 - 100.0 ng/mL   CVD REPORT   Result Value Ref Range    INTERPRETATION Note              ASSESSMENT & PLAN       ICD-10-CM ICD-9-CM    1. Routine general medical examination at a health care facility  Z00.00 V70.0    2. Hypercholesterolemia  E78.00 272.0    3. Vitamin D deficiency  E55.9 268.9    4. Immunity status testing  Z01.84 V72.61 VZV AB, IGG     Fasting lab results from 5/13/22, cardiovascular risk and specific lipid/LDL goals reviewed  Reviewed medications and side effects, doing well on low dose Crestor  Reviewed diet, nutrition, exercise, weight management, BMI/goals. Age/risk based screening recommendations, health maintenance & prevention counseling. Cancer screening USPTFS guidelines reviewed w/ pt today. Discussed benefits/positive/negative outcomes of screening based on age/risk stratification. Informed consent for/against screening based on pt's personal hx/risk factors. Updated in history above and health maintenance.    Seeing gyn next month for well woman visit/gyn exam  Mammogram screen negative 1-2022  Colonoscopy up to date  See details above in HPI about Lexapro and Shingles  Otherwise RTC 1 yr for fasting annual check up or return sooner in the interim prn

## 2022-05-18 NOTE — PROGRESS NOTES
Chief Complaint   Patient presents with    Complete Physical     Fasting labs done 5/13/22     1. \"Have you been to the ER, urgent care clinic since your last visit? Hospitalized since your last visit? \" No    2. \"Have you seen or consulted any other health care providers outside of the [address] [place] [address] since your last visit? \" Yes Where: ortho @ [place]     3. For patients aged 39-70: Has the patient had a colonoscopy / FIT/ Cologuard? 3/2018 polypectomy x 2, repeat 5 yrs, Dr. Adi Shanks      If the patient is female:    4. For patients aged 41-77: Has the patient had a mammogram within the past 2 years? 1/2018      5. For patients aged 21-65: Has the patient had a pap smear?  Dr Nuno Light  About 2 years ago      Previous psych Dr Alexandra Johns is leaving ans she is looking for new psychiatrist

## 2022-05-19 LAB — VZV IGG SER IA-ACNC: 1105 INDEX

## 2022-06-13 DIAGNOSIS — E78.00 HYPERCHOLESTEROLEMIA: ICD-10-CM

## 2022-06-13 RX ORDER — ROSUVASTATIN CALCIUM 5 MG/1
5 TABLET, COATED ORAL
Qty: 90 TABLET | Refills: 3 | Status: SHIPPED | OUTPATIENT
Start: 2022-06-13

## 2022-06-21 ENCOUNTER — OFFICE VISIT (OUTPATIENT)
Dept: FAMILY MEDICINE CLINIC | Age: 62
End: 2022-06-21
Payer: COMMERCIAL

## 2022-06-21 VITALS
RESPIRATION RATE: 16 BRPM | HEIGHT: 65 IN | HEART RATE: 73 BPM | WEIGHT: 168.2 LBS | BODY MASS INDEX: 28.02 KG/M2 | OXYGEN SATURATION: 99 % | SYSTOLIC BLOOD PRESSURE: 106 MMHG | TEMPERATURE: 97.9 F | DIASTOLIC BLOOD PRESSURE: 60 MMHG

## 2022-06-21 DIAGNOSIS — N39.0 ACUTE UTI: Primary | ICD-10-CM

## 2022-06-21 LAB
BILIRUB UR QL STRIP: NEGATIVE
GLUCOSE UR-MCNC: NEGATIVE MG/DL
KETONES P FAST UR STRIP-MCNC: NEGATIVE MG/DL
PH UR STRIP: 5.5 [PH] (ref 4.6–8)
PROT UR QL STRIP: NEGATIVE
SP GR UR STRIP: 1.01 (ref 1–1.03)
UA UROBILINOGEN AMB POC: NORMAL (ref 0.2–1)
URINALYSIS CLARITY POC: NORMAL
URINALYSIS COLOR POC: NORMAL
URINE BLOOD POC: NORMAL
URINE LEUKOCYTES POC: NORMAL
URINE NITRITES POC: NEGATIVE

## 2022-06-21 PROCEDURE — 99213 OFFICE O/P EST LOW 20 MIN: CPT | Performed by: FAMILY MEDICINE

## 2022-06-21 PROCEDURE — 81003 URINALYSIS AUTO W/O SCOPE: CPT | Performed by: FAMILY MEDICINE

## 2022-06-21 RX ORDER — NITROFURANTOIN 25; 75 MG/1; MG/1
100 CAPSULE ORAL 2 TIMES DAILY
Qty: 10 CAPSULE | Refills: 0 | Status: SHIPPED | OUTPATIENT
Start: 2022-06-21 | End: 2022-06-26

## 2022-06-21 RX ORDER — PHENAZOPYRIDINE HYDROCHLORIDE 200 MG/1
200 TABLET, FILM COATED ORAL
Qty: 15 TABLET | Refills: 0 | Status: SHIPPED | OUTPATIENT
Start: 2022-06-21 | End: 2022-06-24

## 2022-06-21 NOTE — PROGRESS NOTES
Chief Complaint   Patient presents with    Urinary Pain     x 2 days    Urgency    Urinary Frequency    Blood in Urine       HISTORY OF PRESENT ILLNESS   HPI  UTI NOTE     Duration or Onset of Symptoms: 2 days     Positive Symptoms: urinary urgency, frequency, pressure, burning pain with urination, hematuria     Negative Symptoms: denies flank pain, abdominal pain, back pain, nausea, vomiting, fevers, chills     Remedies Tried: none     History of Recurrent UTI's: Yes, see below     Recent UTI History: Last UTI 7/2021     Prior Successful Antibiotic Treatment: Macrobid     Past  History:  2014: Recurrent UTI's: Renal US/Urology workup negative  2019: Hematuria/urgency/frequency/bladder spasms/UTI's: Urology evaluation: Urine, cystoscopy and bladder scan done, all reportedly normal. CT Abd/Pelvis negative ; Referred for bladder PT. Gyn Dr. Roberto Helms: Diagnosed w/ atrophy. Prescribed Imvexxy Estrogen Vaginal Tablets but didn't try them d/t side effect concerns  2022: Meghan Platt recommended Replens Vaginal Moisturizer which she uses 2-3 x a week since 6/2022     Sexually Active: Yes     LMP: , LMP 46 yo, No HRT history     Caffeine Usage: only 1 cup of coffee a day     Non smoker     Rare etoh     REVIEW OF SYMPTOMS   Review of Systems   Constitutional: Negative. Negative for chills and fever. Respiratory: Negative. Cardiovascular: Negative. Gastrointestinal: Negative for nausea and vomiting. Genitourinary: Positive for dysuria, frequency, hematuria and urgency. Negative for flank pain. Musculoskeletal: Negative for back pain.            PROBLEM LIST/MEDICAL HISTORY     Problem List  Date Reviewed: 6/21/2022          Codes Class Noted    Gross hematuria ICD-10-CM: R31.0  ICD-9-CM: 599.71  10/1/2019    Overview Signed 10/1/2019 11:03 PM by Opal Greene MD     Urology eval ~ 21 : Cystoscopy and Bladder Scan, Negative; CT 9-2019: negative             History of recurrent UTIs ICD-10-CM: Z87.440  ICD-9-CM: V13.02  9/12/2019    Overview Addendum 6/21/2022 11:07 AM by Temo Healy MD     US 2014; Urology eval ~ 8-2019: Cystoscopy and Bladder Scan, Negative, referred for Bladder PT             Atrophic vaginitis ICD-10-CM: N95.2  ICD-9-CM: 627.3  9/12/2019    Overview Signed 9/12/2019  8:52 AM by Temo Healy MD     2019 Gyn Dr Johnson Apo             Dependent Edema of BLE ICD-10-CM: R60.9  ICD-9-CM: 782.3  11/3/2017        Controlled substance agreement signed ICD-10-CM: Z79.899  ICD-9-CM: V58.69  5/4/2017    Overview Addendum 3/13/2018  3:14 PM by Temo Healy MD     [id number], 3-2018             Hypercholesterolemia ICD-10-CM: E78.00  ICD-9-CM: 272.0  5/4/2017        Screening exams for skin cancer ICD-10-CM: Z12.83  ICD-9-CM: V76.43  5/4/2017    Overview Signed 5/4/2017  1:07 PM by Temo Healy MD     Per Derm Dr Parr Running             Low back pain ICD-10-CM: M54.50  ICD-9-CM: 724.2  2/1/2017    Overview Signed 2/1/2017  8:33 AM by Temo Healy MD     Xrays 8-9199, normal, did PT             Recurrent cystitis ICD-10-CM: N30.90  ICD-9-CM: 595.9  4/28/2016    Overview Signed 3/13/2018  3:29 PM by Temo Healy MD      2014             Chronic insomnia ICD-10-CM: F51.04  ICD-9-CM: 780.52  4/16/2015    Overview Addendum 4/28/2016 11:45 AM by Temo Healy MD     Psych rx'd Algernon Pronto worked really well, but insurance stopped covering             Postmenopause, LMP 48, No HRT ICD-10-CM: Z78.0  ICD-9-CM: V49.81  4/16/2015        Menopausal symptoms ICD-10-CM: N95.1  ICD-9-CM: 627.2  4/3/2014    Overview Signed 4/3/2014 12:01 PM by Temo Healy MD     Gyn Dr Alex Stewart, pt declined HRT             Vitamin D deficiency ICD-10-CM: E55.9  ICD-9-CM: 268.9  7/25/2011    Overview Signed 7/25/2011 10:54 PM by Temo Healy MD     7/2011             Anxiety ICD-10-CM: F41.9  ICD-9-CM: 300.00  7/20/2011    Overview Addendum 5/21/2021  9:42 AM by Gilbert Blunt MD     [id number]; Psych Dr Gabriel Ferrell (retired 2016), changed to new psychiatrist Dr. Bethel Espinosa since spring 2018, changed again to new psychiatrist Dr. Kaylee Cruz             H/O Post partum depression ICD-10-CM: F53.0  ICD-9-CM: 648.44, 60 530 49 87  7/20/2011    Overview Signed 7/20/2011  9:14 AM by Gilbert Blunt MD     1997             Psoriasis ICD-10-CM: L40.9  ICD-9-CM: 696.1  7/20/2011    Overview Signed 7/20/2011  9:18 AM by Gilbert Blunt MD     Derm Dr [place] Hare             Allergic reaction to nickel ICD-10-CM: L23.0  ICD-9-CM: 692.83  7/20/2011        Panic disorder ICD-10-CM: F41.0  ICD-9-CM: 300.01  5/19/2010    Overview Addendum 5/17/2019  9:36 AM by Gilbert Blunt MD     Psych Dr Lauree Epley (retired 2016); 1997; Psych Dr Gabriel Ferrell (retired 2016), changed to new psychiatrist Dr. Bethel Espinosa since spring 2018             Depression ICD-10-CM: F32. A  ICD-9-CM: 051  5/19/2010    Overview Addendum 5/18/2022  1:40 PM by Gilbert Blunt MD     0782; Psych Dr Gabriel Ferrell (retired 2016), changed to new psychiatrist Dr. Bethel Espinosa since spring 2018, then changed to Dr. Kaylee Cruz 2020 (leaving 6/3/22)             Colon polyp ICD-10-CM: K63.5  ICD-9-CM: 211.3  5/19/2010    Overview Signed 5/19/2010 10:06 AM by Mesha Denise     Benign              Family history of breast cancer ICD-10-CM: Z80.3  ICD-9-CM: V16.3  5/19/2010        Family history of diabetes mellitus (DM) ICD-10-CM: Z83.3  ICD-9-CM: V18.0  5/19/2010        Migraine ICD-10-CM: K65.603  ICD-9-CM: 346.90  5/19/2010    Overview Addendum 4/3/2014 11:40 AM by Gilbert Blunt MD     4/04;  Acute migraine w/ neuro changes summer 2013, [facility] ER, CT scan negative             Screening for cardiovascular condition ICD-10-CM: Z13.6  ICD-9-CM: V81.2  5/19/2010    Overview Signed 5/19/2010 10:09 AM by Mesha Denise     For IHSS (negative)                       PAST SURGICAL HISTORY     Past Surgical History:   Procedure Laterality Date    BIOPSY CERVIX  2003    negative    COLONOSCOPY  2002    benign polyp; Dr Abhinav Rg    successful    HX COLONOSCOPY  5/2005    normal, f/u 10 yrs, Dr Shazia Burger HX COLONOSCOPY  03/09/2018    polypectomy x 2, repeat 5 yrs, Dr. Irene Tao  2009    negative    HX GI  2001    Nissen Fundoplication for severe reflux    HX HEMORRHOIDECTOMY  3/2002    HX MALIGNANT SKIN LESION EXCISION  1994    BCC excised right chest    XR CHEST PA LAT  10/2008    normal         MEDICATIONS     Current Outpatient Medications   Medication Sig    rosuvastatin (CRESTOR) 5 mg tablet Take 1 Tablet by mouth nightly.  escitalopram oxalate (Lexapro) 5 mg tablet Take 5 mg by mouth daily. Started by psychiatrist 1-2020    ALPRAZolam (Xanax) 0.25 mg tablet Take 0.25 mg by mouth nightly as needed. Per psychiatrist, has tapered down to . 25 mg 3-4 nights a week  Taking 1/2 tab of the 0.25 pill    cholecalciferol, vitamin D3, (Vitamin D3) 50 mcg (2,000 unit) tab Take 2,000 Units by mouth daily. No current facility-administered medications for this visit.           ALLERGIES     Allergies   Allergen Reactions    Latex Other (comments)    Adhesive Rash    Ambien [Zolpidem] Other (comments)     Didn't like, vivid dreams    Ciprofloxacin Nausea Only     Nausea, achy, felt really bad    Demerol [Meperidine] Other (comments)     GI    Morphine Other (comments)     Chest pain    Nickel Rash          SOCIAL HISTORY     Social History     Tobacco Use    Smoking status: Never Smoker    Smokeless tobacco: Never Used   Substance Use Topics    Alcohol use: No     Comment: very seldom anymore; had really cut it out since 1-2020 but even prior to then was only occasionally     Social History     Social History Narrative    , retired dental hygienist, 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    Diet: nothing special, eats lots of sweets and eating a lot of junk food lately, since ~  did cut out milk she used to drink a lot of; eats [place] yogurt instead     Exercise: walks 1-1.5 miles a day (was up to 2 miles until she got a new puppy who slows her down now), stays very active in her yard and garden as well    Caffeine: 1 cup of coffee qam    Weight: 160's x years currently running on the higher end          Social History     Substance and Sexual Activity   Sexual Activity Yes    Partners: Male       IMMUNIZATIONS     Immunization History   Administered Date(s) Administered    COVID-19, Pfizer Purple top, DILUTE for use, 12+ yrs, 30mcg/0.3mL dose 2021, 2021, 2021    Influenza Nasal Vaccine 2014    Influenza Vaccine 2018    Influenza Vaccine (Quad) Mdck Pf (>2 Yrs Flucelvax QUAD [id number]) 2019    Influenza Vaccine (Quad) PF (>6 Mo Flulaval, Fluarix, and >3 Yrs Jose Andres [id number]) 2018    Tdap 2015         FAMILY HISTORY     Family History   Problem Relation Age of Onset    Breast Cancer Mother 46    Cancer Mother          of lung cancer, 59; smoker    Cancer Father          of lung cancer, 76; smoker    Diabetes Father     Heart Disease Brother          IHSS age 32   Delores Manual Heart Attack Brother     Hypertension Brother     Hypertension Brother     High Cholesterol Brother         on medication    Breast Cancer Maternal Grandmother 48    Heart Disease Maternal Grandmother 61    Diabetes Paternal Grandmother     Heart Disease Paternal Grandmother          CHF 76    Stroke Paternal Grandfather 79    Breast Cancer Maternal Aunt 36    Diabetes Paternal Aunt     Diabetes Paternal Uncle     Other Son         Asperger's Syndrome         VITALS     Visit Vitals  /60 (BP 1 Location: Left upper arm, BP Patient Position: Sitting, BP Cuff Size: Large adult)   Pulse 73   Temp 97.9 °F (36.6 °C) (Oral)   Resp 16   Ht 5' 5\" (1.651 m)   Wt 168 lb 3.2 oz (76.3 kg) LMP 07/19/2011   SpO2 99%   BMI 27.99 kg/m²          PHYSICAL EXAMINATION   Physical Exam  Vitals reviewed. Constitutional:       General: She is not in acute distress. Appearance: Normal appearance. She is not ill-appearing. Cardiovascular:      Rate and Rhythm: Normal rate and regular rhythm. Abdominal:      General: There is no distension. Palpations: Abdomen is soft. There is no mass. Tenderness: There is abdominal tenderness (mild) in the suprapubic area. There is no right CVA tenderness or left CVA tenderness. Neurological:      Mental Status: She is alert. LABORATORY DATA/ANCILLARY/IMAGING     Results for orders placed or performed in visit on 06/21/22   AMB POC URINALYSIS DIP STICK AUTO W/O MICRO   Result Value Ref Range    Color (UA POC) Light Yellow     Clarity (UA POC) Cloudy     Glucose (UA POC) Negative Negative    Bilirubin (UA POC) Negative Negative    Ketones (UA POC) Negative Negative    Specific gravity (UA POC) 1.010 1.001 - 1.035    Blood (UA POC) 3+ Negative    pH (UA POC) 5.5 4.6 - 8.0    Protein (UA POC) Negative Negative    Urobilinogen (UA POC) 0.2 mg/dL 0.2 - 1    Nitrites (UA POC) Negative Negative    Leukocyte esterase (UA POC) 1+ Negative       Lab Results   Component Value Date/Time    ALT (SGPT) 21 05/13/2022 12:00 AM    Alk.  phosphatase 63 05/13/2022 12:00 AM    Bilirubin, direct 0.13 07/20/2016 10:32 AM    Bilirubin, total 0.4 05/13/2022 12:00 AM    Albumin 4.4 05/13/2022 12:00 AM    Protein, total 6.9 05/13/2022 12:00 AM    PLATELET 463 78/11/1445 12:00 AM     Lab Results   Component Value Date/Time    GFR est non-AA 84 05/13/2021 12:00 AM    GFR est AA 97 05/13/2021 12:00 AM    Creatinine 0.82 05/13/2022 12:00 AM    BUN 12 05/13/2022 12:00 AM    Sodium 139 05/13/2022 12:00 AM    Potassium 5.2 05/13/2022 12:00 AM    Chloride 102 05/13/2022 12:00 AM    CO2 24 05/13/2022 12:00 AM         ASSESSMENT & PLAN   Diagnoses and all orders for this visit: 1. Acute UTI  -     AMB POC URINALYSIS DIP STICK AUTO W/O MICRO  -     nitrofurantoin, macrocrystal-monohydrate, (Macrobid) 100 mg capsule; Take 1 Capsule by mouth two (2) times a day for 5 days. -     phenazopyridine (PYRIDIUM) 200 mg tablet; Take 1 Tablet by mouth three (3) times daily as needed (for urinary pain associated w/ UTI) for up to 3 days. -     CULTURE, URINE;  Future    Urine sent for C&S  Start Macrobid 100 mg bid and Pyridium 200 mg tid prn  Reviewed medications and side effects  Increase water intake and stay well hydrated  Further recommendations pending review of C&S and clinical course  Call back/follow up sooner if anything worsens in the interim

## 2022-06-21 NOTE — PROGRESS NOTES
Chief Complaint   Patient presents with    Urinary Pain     x 2 days    Urgency    Urinary Frequency    Blood in Urine     1. \"Have you been to the ER, urgent care clinic since your last visit? Hospitalized since your last visit? \" No    2. \"Have you seen or consulted any other health care providers outside of the [address] [place] [address] since your last visit? \" Yes Where: gyn      3. For patients aged 39-70: Has the patient had a colonoscopy / FIT/ Cologuard? 3/2018 polypectomy x 2, repeat 5 yrs, Dr. Paresh Castro      If the patient is female:    4. For patients aged 41-77: Has the patient had a mammogram within the past 2 years? 1/2022 in system      5. For patients aged 21-65: Has the patient had a pap smear?  Gyn Dr Trenton Morales last week

## 2022-06-26 LAB — BACTERIA UR CULT: ABNORMAL

## 2022-07-11 ENCOUNTER — TELEPHONE (OUTPATIENT)
Dept: FAMILY MEDICINE CLINIC | Age: 62
End: 2022-07-11

## 2022-07-11 NOTE — TELEPHONE ENCOUNTER
----- Message from Prema Holloway sent at 7/8/2022  3:01 PM EDT -----  Regarding: Refill on lexapro   The med is escitalopram 5mg. .( lexapro)    Hi. .I will need a refill on my med in about a week or so. I know you said you would write the script for 5mg. I spoke with the pharmacy  and the want a script sent to them. I am nolonger with   . Thank you. .I appreciate  it    LOV 6/21/22 acute visit, CPE 5/18/22

## 2022-08-02 NOTE — TELEPHONE ENCOUNTER
Patient call stating she has 1 week left of the escitalopram 5mg. Wants refill to be taken care of. See previous notes from 7/8/22 & 7/11/22. Thanks, Manuel Kang    Last Visit: 6/21/22 MD HUDSON  Next Appointment: None  Previous Refill Encounter(s): historical provider    Requested Prescriptions     Pending Prescriptions Disp Refills    escitalopram oxalate (Lexapro) 5 mg tablet 30 Tablet 2     Sig: Take 1 Tablet by mouth in the morning. Started by psychiatrist 1-2020     For [address] [facility] in place:   Recommendation Provided To:    Intervention Detail: New Rx: 1, reason: Patient Preference  Gap Closed?:   Intervention Accepted By:   Time Spent (min): 10

## 2022-08-03 RX ORDER — ESCITALOPRAM OXALATE 5 MG/1
5 TABLET ORAL DAILY
Qty: 90 TABLET | Refills: 1 | Status: SHIPPED | OUTPATIENT
Start: 2022-08-03

## 2022-08-03 NOTE — TELEPHONE ENCOUNTER
I called pt to see if she had enough to last until 8/9. She said that she did she just wanted to have it there when she goes to the pharmacy.

## 2022-10-27 ENCOUNTER — TRANSCRIBE ORDER (OUTPATIENT)
Dept: SCHEDULING | Age: 62
End: 2022-10-27

## 2022-10-27 DIAGNOSIS — Z12.31 SCREENING MAMMOGRAM FOR HIGH-RISK PATIENT: Primary | ICD-10-CM

## 2023-01-09 ENCOUNTER — TELEPHONE (OUTPATIENT)
Dept: FAMILY MEDICINE CLINIC | Age: 63
End: 2023-01-09

## 2023-01-09 NOTE — TELEPHONE ENCOUNTER
Pt called and stated \"I am requesting lab orders put in prior to my physical in May. \"    Callback Phone Number: [phone]    -AH PSR ALVARO

## 2023-01-10 ENCOUNTER — TELEPHONE (OUTPATIENT)
Dept: FAMILY MEDICINE CLINIC | Age: 63
End: 2023-01-10

## 2023-01-10 DIAGNOSIS — Z00.00 LABORATORY TESTS ORDERED AS PART OF A COMPLETE PHYSICAL EXAM (CPE): Primary | ICD-10-CM

## 2023-01-10 NOTE — TELEPHONE ENCOUNTER
----- Message from Pablito Dow sent at 1/9/2023  8:54 AM EST -----  Subject: Message to Provider    QUESTIONS  Information for Provider? Patient is requesting order for annual labs to   be drawn 1 week prior to Physical on 5/23/2023. Please advise. Thank you   ---------------------------------------------------------------------------  --------------  Carlos Arredondo INFO  [phone]; OK to leave message on voicemail  ---------------------------------------------------------------------------  --------------  SCRIPT ANSWERS  Relationship to Patient?  Self

## 2023-01-10 NOTE — TELEPHONE ENCOUNTER
Spoke to patient about scheduling labs, informed patient per nurse that she needs to call back two weeks before appointment to request lab orders.

## 2023-01-11 NOTE — TELEPHONE ENCOUNTER
Fasting lab orders placed. Is patient getting these done at a [facility] walk in lab draw station 1 week prior or does she need to be scheduled for lab only appt at our office 1 week prior? Please call patient for verification. Do not get labs done more than 1 week prior.

## 2023-01-12 NOTE — TELEPHONE ENCOUNTER
Called, spoke to pt. Two pt identifiers confirmed. Lab appointment scheduled a week before appointment.  Frank Cornejo LPN

## 2023-01-13 NOTE — TELEPHONE ENCOUNTER
----- Message from Muniramelissa Nasra sent at 1/9/2023  8:54 AM EST -----  Subject: Message to Provider    QUESTIONS  Information for Provider? Patient is requesting order for annual labs to   be drawn 1 week prior to Physical on 5/23/2023. Please advise. Thank you   ---------------------------------------------------------------------------  --------------  Edward Hiddenbed INFO  [phone]; OK to leave message on voicemail  ---------------------------------------------------------------------------  --------------  SCRIPT ANSWERS  Relationship to Patient?  Self

## 2023-01-17 ENCOUNTER — HOSPITAL ENCOUNTER (OUTPATIENT)
Dept: MAMMOGRAPHY | Age: 63
Discharge: HOME | End: 2023-01-17
Attending: FAMILY MEDICINE
Payer: COMMERCIAL

## 2023-01-17 DIAGNOSIS — Z12.31 SCREENING MAMMOGRAM FOR HIGH-RISK PATIENT: ICD-10-CM

## 2023-01-17 PROCEDURE — 77063 BREAST TOMOSYNTHESIS BI: CPT

## 2023-02-10 ENCOUNTER — TELEPHONE (OUTPATIENT)
Dept: FAMILY MEDICINE CLINIC | Age: 63
End: 2023-02-10

## 2023-02-10 NOTE — TELEPHONE ENCOUNTER
Pt called said her family tested positive for covid, but she is testing negative. She woke up this morning with a  cough, congestion sore throat, chills, and nausea. Pt is concerned  that she might test positive over the weekend. She wants to know if the provider could call in paxlovid.     [facility]# [phone]

## 2023-02-10 NOTE — TELEPHONE ENCOUNTER
Spoke to pt and she reports that she doesn't have covid yet. She was calling b/c her  and son both tested positive. She wanted to know if she gets it over the weekend should she get the paxlovid. Advised that they are really saving the paxlovid for people over 65 or are high risk. I did go over Dr Joni olivier  to take Vit C 1000 mg , Vit D3 1000 units and zinc 50 mg everyday. Treat symptoms with over the counter medications ie tylenol for fever, head aches, body aches. Mucinex DM 12 hour for cough. If you get high fever, shortness of breath then go to ER. Pt states understanding.

## 2024-01-02 ENCOUNTER — TRANSCRIBE ORDERS (OUTPATIENT)
Facility: HOSPITAL | Age: 64
End: 2024-01-02

## 2024-01-02 DIAGNOSIS — Z12.31 SCREENING MAMMOGRAM FOR HIGH-RISK PATIENT: Primary | ICD-10-CM

## 2024-01-15 NOTE — TELEPHONE ENCOUNTER
Advise pt radiologist reports CT results as follows: No urinary tract stones are seen. There is no hydroureteronephrosis. The kidneys  are normal in size and without mass. Nothing acute. No abnormalities identified.      Follow up w/ Urology for ongoing issues No

## 2024-01-26 ENCOUNTER — HOSPITAL ENCOUNTER (OUTPATIENT)
Facility: HOSPITAL | Age: 64
Discharge: HOME | End: 2024-01-26
Payer: COMMERCIAL

## 2024-01-26 DIAGNOSIS — Z12.31 SCREENING MAMMOGRAM FOR HIGH-RISK PATIENT: ICD-10-CM

## 2024-01-26 PROCEDURE — 77063 BREAST TOMOSYNTHESIS BI: CPT

## 2024-07-22 ENCOUNTER — TRANSCRIPTION ENCOUNTER (OUTPATIENT)
Age: 64
End: 2024-07-22

## 2025-01-08 ENCOUNTER — TRANSCRIBE ORDERS (OUTPATIENT)
Facility: HOSPITAL | Age: 65
End: 2025-01-08

## 2025-01-08 DIAGNOSIS — Z12.31 OTHER SCREENING MAMMOGRAM: Primary | ICD-10-CM

## 2025-02-25 ENCOUNTER — HOSPITAL ENCOUNTER (OUTPATIENT)
Facility: HOSPITAL | Age: 65
Discharge: HOME | End: 2025-02-28
Attending: OBSTETRICS & GYNECOLOGY
Payer: COMMERCIAL

## 2025-02-25 DIAGNOSIS — Z12.31 OTHER SCREENING MAMMOGRAM: ICD-10-CM

## 2025-02-25 PROCEDURE — 77067 SCR MAMMO BI INCL CAD: CPT
